# Patient Record
Sex: FEMALE | Race: WHITE | NOT HISPANIC OR LATINO | Employment: FULL TIME
[De-identification: names, ages, dates, MRNs, and addresses within clinical notes are randomized per-mention and may not be internally consistent; named-entity substitution may affect disease eponyms.]

---

## 2017-05-26 ENCOUNTER — HEALTH MAINTENANCE LETTER (OUTPATIENT)
Age: 52
End: 2017-05-26

## 2017-09-18 ENCOUNTER — HOSPITAL ENCOUNTER (OUTPATIENT)
Dept: MAMMOGRAPHY | Facility: CLINIC | Age: 52
Discharge: HOME | End: 2017-09-18
Attending: PHYSICIAN ASSISTANT | Admitting: PHYSICIAN ASSISTANT
Payer: COMMERCIAL

## 2017-09-18 DIAGNOSIS — Z12.31 VISIT FOR SCREENING MAMMOGRAM: ICD-10-CM

## 2017-09-18 PROCEDURE — G0202 SCR MAMMO BI INCL CAD: HCPCS

## 2017-11-29 ENCOUNTER — OFFICE VISIT (OUTPATIENT)
Dept: FAMILY MEDICINE | Facility: CLINIC | Age: 52
End: 2017-11-29
Payer: COMMERCIAL

## 2017-11-29 VITALS
WEIGHT: 215 LBS | HEART RATE: 84 BPM | SYSTOLIC BLOOD PRESSURE: 118 MMHG | BODY MASS INDEX: 33.74 KG/M2 | HEIGHT: 67 IN | DIASTOLIC BLOOD PRESSURE: 80 MMHG | TEMPERATURE: 97.8 F | RESPIRATION RATE: 18 BRPM | OXYGEN SATURATION: 94 %

## 2017-11-29 DIAGNOSIS — Z23 NEED FOR VACCINATION: ICD-10-CM

## 2017-11-29 DIAGNOSIS — Z00.01 ENCOUNTER FOR ROUTINE ADULT HEALTH EXAMINATION WITH ABNORMAL FINDINGS: Primary | ICD-10-CM

## 2017-11-29 DIAGNOSIS — Z12.11 SPECIAL SCREENING FOR MALIGNANT NEOPLASMS, COLON: ICD-10-CM

## 2017-11-29 DIAGNOSIS — R60.0 PERIPHERAL EDEMA: ICD-10-CM

## 2017-11-29 DIAGNOSIS — E66.811 OBESITY, CLASS I, BMI 30-34.9: ICD-10-CM

## 2017-11-29 PROCEDURE — 99396 PREV VISIT EST AGE 40-64: CPT | Performed by: PHYSICIAN ASSISTANT

## 2017-11-29 PROCEDURE — 90670 PCV13 VACCINE IM: CPT | Performed by: PHYSICIAN ASSISTANT

## 2017-11-29 PROCEDURE — 90472 IMMUNIZATION ADMIN EACH ADD: CPT | Performed by: PHYSICIAN ASSISTANT

## 2017-11-29 PROCEDURE — 87624 HPV HI-RISK TYP POOLED RSLT: CPT | Performed by: PHYSICIAN ASSISTANT

## 2017-11-29 PROCEDURE — 90471 IMMUNIZATION ADMIN: CPT | Performed by: PHYSICIAN ASSISTANT

## 2017-11-29 PROCEDURE — 90715 TDAP VACCINE 7 YRS/> IM: CPT | Performed by: PHYSICIAN ASSISTANT

## 2017-11-29 PROCEDURE — G0145 SCR C/V CYTO,THINLAYER,RESCR: HCPCS | Performed by: PHYSICIAN ASSISTANT

## 2017-11-29 ASSESSMENT — PAIN SCALES - GENERAL: PAINLEVEL: NO PAIN (0)

## 2017-11-29 NOTE — PROGRESS NOTES
SUBJECTIVE:   CC: Velma Rizvi is an 52 year old woman who presents for preventive health visit. Requesting new order for compression stockings due to varicosities. Also has hx of thrombophlebitis.     Physical   Annual:     Getting at least 3 servings of Calcium per day::  NO    Bi-annual eye exam::  Yes    Dental care twice a year::  Yes    Sleep apnea or symptoms of sleep apnea::  None    Diet::  Regular (no restrictions)    Frequency of exercise::  None    Taking medications regularly::  Yes    Medication side effects::  None    Additional concerns today::  No              Today's PHQ-2 Score:   PHQ-2 (  [employer]) 2017   Q1: Little interest or pleasure in doing things 0   Q2: Feeling down, depressed or hopeless 0   PHQ-2 Score 0   Q1: Little interest or pleasure in doing things Not at all   Q2: Feeling down, depressed or hopeless Not at all   PHQ-2 Score 0       Abuse: Current or Past(Physical, Sexual or Emotional)- No  Do you feel safe in your environment - Yes    Social History   Substance Use Topics     Smoking status: Current Every Day Smoker     Packs/day: 0.50     Years: 15.00     Smokeless tobacco: Never Used     Alcohol use Yes      Comment: rarely     The patient does not drink >3 drinks per day nor >7 drinks per week.    Reviewed orders with patient.  Reviewed health maintenance and updated orders accordingly - Yes  Patient Active Problem List   Diagnosis     Tobacco use disorder     Irregular periods     Varicose veins of legs     CARDIOVASCULAR SCREENING; LDL GOAL LESS THAN 160     Phlebitis and thrombophlebitis     Past Surgical History:   Procedure Laterality Date     C NONSPECIFIC PROCEDURE      Right hand surgery- tendon repair     HC TREATMENT INCOMPLETE  SURG, ANY TRIMESTER      D & C  s/p miscarriage       Social History   Substance Use Topics     Smoking status: Current Every Day Smoker     Packs/day: 0.50     Years: 15.00     Smokeless tobacco: Never Used     Alcohol  "use Yes      Comment: rarely     Family History   Problem Relation Age of Onset     CANCER Mother       - lung cancer with mets.     Respiratory Father      asthma     CANCER Maternal Grandmother      breast- 48 fatal age 51     DIABETES Maternal Uncle      Hypertension Brother      DIABETES Brother              Patient over age 50, mutual decision to screen reflected in health maintenance.      Pertinent mammograms are reviewed under the imaging tab.  History of abnormal Pap smear: NO - age 30-65 PAP every 5 years with negative HPV co-testing recommended    Reviewed and updated as needed this visit by clinical staffTobacco  Allergies  Meds         Reviewed and updated as needed this visit by Provider            Review of Systems  C: NEGATIVE for fever, chills, change in weight  I: NEGATIVE for worrisome rashes, moles or lesions  E: NEGATIVE for vision changes or irritation  ENT: NEGATIVE for ear, mouth and throat problems  R: NEGATIVE for significant cough or SOB  B: NEGATIVE for masses, tenderness or discharge  CV: NEGATIVE for chest pain, palpitations or peripheral edema  GI: NEGATIVE for nausea, abdominal pain, heartburn, or change in bowel habits  : NEGATIVE for unusual urinary or vaginal symptoms. No vaginal bleeding.  M: NEGATIVE for significant arthralgias or myalgia  N: NEGATIVE for weakness, dizziness or paresthesias  E: NEGATIVE for temperature intolerance, skin/hair changes  H: NEGATIVE for bleeding problems  P: NEGATIVE for changes in mood or affect      OBJECTIVE:   /80  Pulse 84  Temp 97.8  F (36.6  C) (Tympanic)  Resp 18  Ht 5' 6.5\" (1.689 m)  Wt 215 lb (97.5 kg)  LMP 2017  SpO2 94%  BMI 34.18 kg/m2  Physical Exam  GENERAL APPEARANCE: healthy, alert and no distress  EYES: Eyes grossly normal to inspection, PERRL and conjunctivae and sclerae normal  HENT: ear canals and TM's normal, nose and mouth without ulcers or lesions, oropharynx clear and oral mucous membranes " moist  NECK: no adenopathy, no asymmetry, masses, or scars and thyroid normal to palpation  RESP: lungs clear to auscultation - no rales, rhonchi or wheezes  BREAST: normal without masses, tenderness or nipple discharge and no palpable axillary masses or adenopathy  CV: regular rate and rhythm, normal S1 S2, no S3 or S4, no murmur, click or rub, no peripheral edema and peripheral pulses strong  ABDOMEN: soft, nontender, no hepatosplenomegaly, no masses and bowel sounds normal   (female): normal female external genitalia, normal urethral meatus, vaginal mucosal atrophy noted, normal cervix, adnexae, and uterus without masses or abnormal discharge  MS: no musculoskeletal defects are noted and gait is age appropriate without ataxia  SKIN: no suspicious lesions or rashes  NEURO: Normal strength and tone, sensory exam grossly normal, mentation intact and speech normal  PSYCH: mentation appears normal and affect normal/bright    ASSESSMENT/PLAN:       ICD-10-CM    1. Encounter for routine adult health examination without abnormal findings Z00.00 Pap imaged thin layer screen with HPV - recommended age 30 - 65 years (select HPV order below)     HPV High Risk Types DNA Cervical   2. Peripheral edema R60.9 COMPRESSION STOCKINGS   3. Need for vaccination Z23 Pneumococcal vaccine 13 valent PCV13 IM (Prevnar) [04980]     TDAP VACCINE (ADACEL) [58287.002]     1st  Administration  [01440]     Each additional admin.  (Right click and add QUANTITY)  [39485]       COUNSELING:  Reviewed preventive health counseling, as reflected in patient instructions       Regular exercise       Healthy diet/nutrition       Vision screening       Immunizations    Vaccinated for: Pneumococcal and TDAP           Osteoporosis Prevention/Bone Health           reports that she has been smoking.  She has a 7.50 pack-year smoking history. She has never used smokeless tobacco.  Tobacco Cessation Action Plan: Information offered: Patient not interested at  "this timereviewed MT services with her today.   Estimated body mass index is 34.18 kg/(m^2) as calculated from the following:    Height as of this encounter: 5' 6.5\" (1.689 m).    Weight as of this encounter: 215 lb (97.5 kg).   Weight management plan: Discussed healthy diet and exercise guidelines and patient will follow up in 12 months in clinic to re-evaluate.     Wrote script for compression stockings.    Counseling Resources:  ATP IV Guidelines  Pooled Cohorts Equation Calculator  Breast Cancer Risk Calculator  FRAX Risk Assessment  ICSI Preventive Guidelines  Dietary Guidelines for Americans, 2010  [employer]'s MyPlate  ASA Prophylaxis  Lung CA Screening    Rhoda Antony PA-C  [facility]    Orders Placed This Encounter     Pneumococcal vaccine 13 valent PCV13 IM (Prevnar) [80792]     TDAP VACCINE (ADACEL) [59335.002]     1st  Administration  [93892]     Each additional admin.  (Right click and add QUANTITY)  [39495]     Pap imaged thin layer screen with HPV - recommended age 30 - 65 years (select HPV order below)     HPV High Risk Types DNA Cervical     COMPRESSION STOCKINGS       AVS given to patient upon discharge today.  Electronically signed by Rhoda Antony PA-C  November 30, 2017  3:27 PM      "

## 2017-11-29 NOTE — NURSING NOTE
"Chief Complaint   Patient presents with     Physical       Initial /80  Pulse 84  Temp 97.8  F (36.6  C) (Tympanic)  Resp 18  Ht 5' 6.5\" (1.689 m)  Wt 215 lb (97.5 kg)  LMP 03/14/2017  SpO2 94%  BMI 34.18 kg/m2 Estimated body mass index is 34.18 kg/(m^2) as calculated from the following:    Height as of this encounter: 5' 6.5\" (1.689 m).    Weight as of this encounter: 215 lb (97.5 kg).  BP completed using cuff size: mundo Watt MA      "

## 2017-11-29 NOTE — NURSING NOTE
Prior to injection verified patient identity using patient's name and date of birth.  Per orders of Rhoda Antony injection of Tdap and Prevnar 13  given by Loren Watt MA. Patient instructed to remain in clinic for 20 minutes afterwards and to report any adverse reaction to me immediately.         Screening Questionnaire for Adult Immunization    Are you sick today?   No   Do you have allergies to medications, food, a vaccine component or latex?   No   Have you ever had a serious reaction after receiving a vaccination?   No   Do you have a long-term health problem with heart disease, lung disease, asthma, kidney disease, metabolic disease (e.g. diabetes), anemia, or other blood disorder?   No   Do you have cancer, leukemia, HIV/AIDS, or any other immune system problem?   No   In the past 3 months, have you taken medications that affect  your immune system, such as prednisone, other steroids, or anticancer drugs; drugs for the treatment of rheumatoid arthritis, Crohn s disease, or psoriasis; or have you had radiation treatments?   No   Have you had a seizure, or a brain or other nervous system problem?   No   During the past year, have you received a transfusion of blood or blood     products, or been given immune (gamma) globulin or antiviral drug?   No   For women: Are you pregnant or is there a chance you could become        pregnant during the next month?   No   Have you received any vaccinations in the past 4 weeks?   No     Immunization questionnaire answers were all negative.           Screening performed by Vivian Watt on 11/29/2017 at 5:15 PM.

## 2017-11-29 NOTE — MR AVS SNAPSHOT
After Visit Summary   11/29/2017    Velma Rizvi    MRN: [id number]           Patient Information     Date Of Birth          1965        Visit Information        Provider Department      11/29/2017 4:15 PM Rhoda Antony PA-C [facility]        Today's Diagnoses     Peripheral edema          Care Instructions      Preventive Health Recommendations  Female Ages 50 - 64    Yearly exam: See your health care provider every year in order to  o Review health changes.   o Discuss preventive care.    o Review your medicines if your doctor has prescribed any.      Get a Pap test every three years (unless you have an abnormal result and your provider advises testing more often).    If you get Pap tests with HPV test, you only need to test every 5 years, unless you have an abnormal result.     You do not need a Pap test if your uterus was removed (hysterectomy) and you have not had cancer.    You should be tested each year for STDs (sexually transmitted diseases) if you're at risk.     Have a mammogram every 1 to 2 years.    Have a colonoscopy at age 50, or have a yearly FIT test (stool test). These exams screen for colon cancer.      Have a cholesterol test every 5 years, or more often if advised.    Have a diabetes test (fasting glucose) every three years. If you are at risk for diabetes, you should have this test more often.     If you are at risk for osteoporosis (brittle bone disease), think about having a bone density scan (DEXA).    Shots: Get a flu shot each year. Get a tetanus shot every 10 years.    Nutrition:     Eat at least 5 servings of fruits and vegetables each day.    Eat whole-grain bread, whole-wheat pasta and brown rice instead of white grains and rice.    Talk to your provider about Calcium and Vitamin D.     Lifestyle    Exercise at least 150 minutes a week (30 minutes a day, 5 days a week). This will help you control your weight and prevent disease.    Limit  "alcohol to one drink per day.    No smoking.     Wear sunscreen to prevent skin cancer.     See your dentist every six months for an exam and cleaning.    See your eye doctor every 1 to 2 years.            Follow-ups after your visit        Who to contact     If you have questions or need follow up information about today's clinic visit or your schedule please contact [facility] directly at [phone].  Normal or non-critical lab and imaging results will be communicated to you by MyChart, letter or phone within 4 business days after the clinic has received the results. If you do not hear from us within 7 days, please contact the clinic through Medstrohart or phone. If you have a critical or abnormal lab result, we will notify you by phone as soon as possible.  Submit refill requests through [email or web address] or call your pharmacy and they will forward the refill request to us. Please allow 3 business days for your refill to be completed.          Additional Information About Your Visit        MedstroharSignicat Information     [email or web address] lets you send messages to your doctor, view your test results, renew your prescriptions, schedule appointments and more. To sign up, go to [email or web address] . Click on \"Log in\" on the left side of the screen, which will take you to the Welcome page. Then click on \"Sign up Now\" on the right side of the page.     You will be asked to enter the access code listed below, as well as some personal information. Please follow the directions to create your username and password.     Your access code is: [email or web address]  Expires: 2018  5:08 PM     Your access code will  in 90 days. If you need help or a new code, please call your [place] clinic or [phone].        Care EveryWhere ID     This is your Care EveryWhere ID. This could be used by other organizations to access your [place] medical records  [id number]        Your Vitals Were     Pulse Temperature Respirations Height Last " "Period Pulse Oximetry    84 97.8  F (36.6  C) (Tympanic) 18 5' 6.5\" (1.689 m) 03/14/2017 94%    BMI (Body Mass Index)                   34.18 kg/m2            Blood Pressure from Last 3 Encounters:   11/29/17 118/80   06/03/16 124/69   06/03/16 132/72    Weight from Last 3 Encounters:   11/29/17 215 lb (97.5 kg)   06/03/16 200 lb (90.7 kg)   01/05/14 203 lb (92.1 kg)              Today, you had the following     No orders found for display         Today's Medication Changes          These changes are accurate as of: 11/29/17  5:08 PM.  If you have any questions, ask your nurse or doctor.               These medicines have changed or have updated prescriptions.        Dose/Directions    COMPRESSION STOCKINGS   This may have changed:  additional instructions   Used for:  Peripheral edema   Changed by:  Rhoda Antony PA-C        Dose:  1 each   1 each daily Wear daily size large, two pair   Quantity:  2 each   Refills:  0            Where to get your medicines      Some of these will need a paper prescription and others can be bought over the counter.  Ask your nurse if you have questions.     Bring a paper prescription for each of these medications     COMPRESSION STOCKINGS                Primary Care Provider Office Phone # Fax #    Rhoda Antony PA-C [phone] [address] [place] [address]        Equal Access to Services     KYRIE CROWE AH: Hadii savita torres hadasho Soomaali, waaxda luqadaha, qaybta kaalmada adeegyada, [place] domingo haygiovany elmore . So [facility] [phone].    ATENCIÓN: Si habla español, tiene a rome disposición servicios gratuitos de asistencia lingüística. Llame al [phone].    We comply with applicable federal civil rights laws and [place] laws. We do not discriminate on the basis of race, color, national origin, age, disability, sex, sexual orientation, or gender identity.            Thank you!     Thank you for choosing [facility]" for your care. Our goal is always to provide you with excellent care. Hearing back from our patients is one way we can continue to improve our services. Please take a few minutes to complete the written survey that you may receive in the mail after your visit with us. Thank you!             Your Updated Medication List - Protect others around you: Learn how to safely use, store and throw away your medicines at www.disposemymeds.org.          This list is accurate as of: 11/29/17  5:08 PM.  Always use your most recent med list.                   Brand Name Dispense Instructions for use Diagnosis    aspirin 81 MG tablet      Take  by mouth daily.        COMPRESSION STOCKINGS     2 each    1 each daily Wear daily size large, two pair    Peripheral edema

## 2017-11-29 NOTE — LETTER
December 9, 2017    Velma Rizvi  [address] [address]    Dear Velma,  We are happy to inform you that your PAP smear result from 11/29/17 is normal.  We are now able to do a follow up test on PAP smears. The DNA test is for HPV (Human Papilloma Virus). Cervical cancer is closely linked with certain types of HPV. Your result showed no evidence of high risk HPV.  Therefore we recommend you return in 5 years for your next pap smear and HPV test.  You will still need to return to the clinic every year for an annual exam and other preventive tests.  Please contact the clinic at [phone] with any questions.  Sincerely,    Rhoda Antony PA-C/clarissa

## 2017-12-01 LAB
COPATH REPORT: NORMAL
PAP: NORMAL

## 2017-12-02 PROBLEM — E66.811 OBESITY, CLASS I, BMI 30-34.9: Status: ACTIVE | Noted: 2017-12-02

## 2017-12-04 LAB
FINAL DIAGNOSIS: NORMAL
HPV HR 12 DNA CVX QL NAA+PROBE: NEGATIVE
HPV16 DNA SPEC QL NAA+PROBE: NEGATIVE
HPV18 DNA SPEC QL NAA+PROBE: NEGATIVE
SPECIMEN DESCRIPTION: NORMAL

## 2017-12-12 ENCOUNTER — TELEPHONE (OUTPATIENT)
Dept: FAMILY MEDICINE | Facility: CLINIC | Age: 52
End: 2017-12-12

## 2017-12-12 NOTE — TELEPHONE ENCOUNTER
Left message for patient to return call to schedule colonoscopy or EGD. If Indu or Neda are unavailable, please transfer to the surgery center.     OK to schedule with Alli

## 2017-12-13 NOTE — TELEPHONE ENCOUNTER
Left message for patient to return call to schedule EGD/colonoscopy. If Neda or Indu are not available, please transfer to same day surgery

## 2018-01-08 ENCOUNTER — HOSPITAL ENCOUNTER (OUTPATIENT)
Facility: CLINIC | Age: 53
Discharge: HOME | End: 2018-01-08
Attending: INTERNAL MEDICINE | Admitting: INTERNAL MEDICINE
Payer: COMMERCIAL

## 2018-01-08 ENCOUNTER — SURGERY (OUTPATIENT)
Age: 53
End: 2018-01-08

## 2018-01-08 VITALS
TEMPERATURE: 97.8 F | DIASTOLIC BLOOD PRESSURE: 74 MMHG | SYSTOLIC BLOOD PRESSURE: 108 MMHG | RESPIRATION RATE: 16 BRPM | OXYGEN SATURATION: 98 % | HEART RATE: 79 BPM

## 2018-01-08 LAB — COLONOSCOPY: NORMAL

## 2018-01-08 PROCEDURE — 40000296 ZZH STATISTIC ENDO RECOVERY CLASS 1:2 FIRST HOUR: Performed by: INTERNAL MEDICINE

## 2018-01-08 PROCEDURE — 45378 DIAGNOSTIC COLONOSCOPY: CPT | Performed by: INTERNAL MEDICINE

## 2018-01-08 PROCEDURE — 25000132 ZZH RX MED GY IP 250 OP 250 PS 637: Performed by: INTERNAL MEDICINE

## 2018-01-08 PROCEDURE — 25000128 H RX IP 250 OP 636: Performed by: INTERNAL MEDICINE

## 2018-01-08 PROCEDURE — G0500 MOD SEDAT ENDO SERVICE >5YRS: HCPCS

## 2018-01-08 PROCEDURE — 25000125 ZZHC RX 250: Performed by: INTERNAL MEDICINE

## 2018-01-08 PROCEDURE — G0121 COLON CA SCRN NOT HI RSK IND: HCPCS | Performed by: INTERNAL MEDICINE

## 2018-01-08 RX ORDER — LIDOCAINE 40 MG/G
CREAM TOPICAL
Status: DISCONTINUED | OUTPATIENT
Start: 2018-01-08 | End: 2018-01-08 | Stop reason: HOSPADM

## 2018-01-08 RX ORDER — SIMETHICONE
LIQUID (ML) MISCELLANEOUS PRN
Status: DISCONTINUED | OUTPATIENT
Start: 2018-01-08 | End: 2018-01-08 | Stop reason: HOSPADM

## 2018-01-08 RX ORDER — ONDANSETRON 2 MG/ML
4 INJECTION INTRAMUSCULAR; INTRAVENOUS
Status: COMPLETED | OUTPATIENT
Start: 2018-01-08 | End: 2018-01-08

## 2018-01-08 RX ORDER — FENTANYL CITRATE 50 UG/ML
INJECTION, SOLUTION INTRAMUSCULAR; INTRAVENOUS PRN
Status: DISCONTINUED | OUTPATIENT
Start: 2018-01-08 | End: 2018-01-08 | Stop reason: HOSPADM

## 2018-01-08 RX ADMIN — MIDAZOLAM 2 MG: 1 INJECTION INTRAMUSCULAR; INTRAVENOUS at 11:07

## 2018-01-08 RX ADMIN — MIDAZOLAM 1 MG: 1 INJECTION INTRAMUSCULAR; INTRAVENOUS at 11:11

## 2018-01-08 RX ADMIN — MIDAZOLAM 1 MG: 1 INJECTION INTRAMUSCULAR; INTRAVENOUS at 11:15

## 2018-01-08 RX ADMIN — FENTANYL CITRATE 50 MCG: 50 INJECTION, SOLUTION INTRAMUSCULAR; INTRAVENOUS at 11:07

## 2018-01-08 RX ADMIN — Medication 20 MG: at 11:17

## 2018-01-08 RX ADMIN — ONDANSETRON HYDROCHLORIDE 4 MG: 2 SOLUTION INTRAMUSCULAR; INTRAVENOUS at 10:59

## 2018-01-08 RX ADMIN — MIDAZOLAM 1 MG: 1 INJECTION INTRAMUSCULAR; INTRAVENOUS at 11:10

## 2018-01-08 RX ADMIN — LIDOCAINE HYDROCHLORIDE 1 ML: 10 INJECTION, SOLUTION EPIDURAL; INFILTRATION; INTRACAUDAL; PERINEURAL at 10:18

## 2018-01-08 RX ADMIN — MIDAZOLAM 1 MG: 1 INJECTION INTRAMUSCULAR; INTRAVENOUS at 11:09

## 2018-01-08 RX ADMIN — MIDAZOLAM 1 MG: 1 INJECTION INTRAMUSCULAR; INTRAVENOUS at 11:08

## 2018-01-08 RX ADMIN — FENTANYL CITRATE 50 MCG: 50 INJECTION, SOLUTION INTRAMUSCULAR; INTRAVENOUS at 11:10

## 2018-01-08 NOTE — IP AVS SNAPSHOT
"                  MRN:[id number]                      After Visit Summary   1/8/2018    Velma Rizvi    MRN: [id number]           Thank you!     Thank you for choosing [facility] for your care. Our goal is always to provide you with excellent care. Hearing back from our patients is one way we can continue to improve our services. Please take a few minutes to complete the written survey that you may receive in the mail after you visit with us. Thank you!        Patient Information     Date Of Birth          1965        About your hospital stay     You were admitted on:  January 8, 2018 You last received care in the:  [facility]    You were discharged on:  January 8, 2018       Who to Call     For medical emergencies, please call 911.  For non-urgent questions about your medical care, please call your primary care provider or clinic, [phone]  For questions related to your surgery, please call your surgery clinic        Attending Provider     Provider Specialty    Paresh Curran MD Gastroenterology       Primary Care Provider Office Phone # Fax #    Rhoda Antony PA-C [phone]      Pending Results     No orders found from 1/6/2018 to 1/9/2018.            Admission Information     Date & Time Provider Department Dept. Phone    1/8/2018 Paresh Curran MD [facility] [phone]      Your Vitals Were     Blood Pressure Pulse Temperature Respirations Last Period Pulse Oximetry    108/74 79 97.8  F (36.6  C) (Oral) 16 03/14/2017 99%      MyChart Information     [employer] lets you send messages to your doctor, view your test results, renew your prescriptions, schedule appointments and more. To sign up, go to [email or web address] . Click on \"Log in\" on the left side of the screen, which will take you to the Welcome page. Then click on \"Sign up Now\" on the right side of the page.     You will be asked to enter the access code listed below, as well " as some personal information. Please follow the directions to create your username and password.     Your access code is: [email or web address]  Expires: 2018  5:08 PM     Your access code will  in 90 days. If you need help or a new code, please call your [facility] clinic or [phone].        Care EveryWhere ID     This is your Care EveryWhere ID. This could be used by other organizations to access your [facility] medical records  [id number]        Equal Access to Services     KYRIE CROWE : Hadii asvita torres hadasho Soomaali, waaxda luqadaha, qaybta kaalmada adeegyada, [place] elmoer . So [facility] [phone].    ATENCIÓN: Si habla español, tiene a rome disposición servicios gratuitos de asistencia lingüística. Llame al [phone].    We comply with applicable federal civil rights laws and [place] laws. We do not discriminate on the basis of race, color, national origin, age, disability, sex, sexual orientation, or gender identity.               Review of your medicines      CONTINUE these medicines which have NOT CHANGED        Dose / Directions    ADVIL PO   Indication:  Migraine Headache        Dose:  800 mg   Take 800 mg by mouth every 6 hours as needed for moderate pain   Refills:  0       aspirin 81 MG tablet        Take  by mouth daily.   Refills:  0       COMPRESSION STOCKINGS   Used for:  Peripheral edema        Dose:  1 each   1 each daily Wear daily size large, two pair   Quantity:  2 each   Refills:  0       VITAMIN D (CHOLECALCIFEROL) PO        Dose:  2000 Units   Take 2,000 Units by mouth daily   Refills:  0                Protect others around you: Learn how to safely use, store and throw away your medicines at www.disposemymeds.org.             Medication List: This is a list of all your medications and when to take them. Check marks below indicate your daily home schedule. Keep this list as a reference.      Medications           Morning Afternoon Evening Bedtime As Needed     ADVIL PO   Take 800 mg by mouth every 6 hours as needed for moderate pain                                aspirin 81 MG tablet   Take  by mouth daily.                                COMPRESSION STOCKINGS   1 each daily Wear daily size large, two pair                                VITAMIN D (CHOLECALCIFEROL) PO   Take 2,000 Units by mouth daily

## 2018-01-08 NOTE — IP AVS SNAPSHOT
[facility]    [address] [address]    Phone:  [phone]                                       After Visit Summary   1/8/2018    Velma Rizvi    MRN: [id number]           After Visit Summary Signature Page     I have received my discharge instructions, and my questions have been answered. I have discussed any challenges I see with this plan with the nurse or doctor.    ..........................................................................................................................................  Patient/Patient Representative Signature      ..........................................................................................................................................  Patient Representative Print Name and Relationship to Patient    ..................................................               ................................................  Date                                            Time    ..........................................................................................................................................  Reviewed by Signature/Title    ...................................................              ..............................................  Date                                                            Time

## 2018-01-08 NOTE — CONSULTS
[facility] GI Pre-Procedure Physical Assessment    Velma Rizvi MRN# [id number]   Age: 52 year old YOB: 1965      Date of Surgery: 1/8/2018  Location [facility]      Date of Exam 1/8/2018 Facility (Same day)       Home clinic: [facility]  Primary care provider: Rhoda Antony         Active problem list:   Patient Active Problem List   Diagnosis     Tobacco use disorder     Irregular periods     Varicose veins of legs     CARDIOVASCULAR SCREENING; LDL GOAL LESS THAN 160     Phlebitis and thrombophlebitis     Obesity, Class I, BMI 30-34.9            Medications (include herbals and vitamins):   Any Plavix use in the last 7 days?  No     Current Facility-Administered Medications   Medication     lidocaine 1 % 1 mL     lidocaine (LMX4) kit     sodium chloride (PF) 0.9% PF flush 3 mL     sodium chloride (PF) 0.9% PF flush 3 mL     ondansetron (ZOFRAN) injection 4 mg             Allergies:      Allergies   Allergen Reactions     No Known Drug Allergies      Allergy to Latex?  No  Allergy to tape?    No          Social History:     Social History   Substance Use Topics     Smoking status: Current Every Day Smoker     Packs/day: 0.50     Years: 15.00     Smokeless tobacco: Never Used     Alcohol use Yes      Comment: rarely            Physical Exam:   All vitals have been reviewed  Blood pressure 127/85, pulse 79, temperature 97.8  F (36.6  C), temperature source Oral, resp. rate 16, last menstrual period 03/14/2017, SpO2 98 %.  Airway assessment:   Patient is able to open mouth wide  Patient is able to stick out tongue      Lungs:   No increased work of breathing, good air exchange, clear to auscultation bilaterally, no crackles or wheezing      Cardiovascular:   Normal apical impulse, regular rate and rhythm, normal S1 and S2, no S3 or S4, and no murmur noted           Lab / Radiology Results:   All laboratory data reviewed          Assessment:    Appropriately NPO  Chief complaint or anatomic assessment of involved area: screen         Plan:   Moderate (conscious) sedation     Patient's active problems diagnostically and therapeutically optimized for the planned procedure  Risks, benefits, alternatives to sedation and blood explained and consent obtained  Risks, benefits, alternatives to procedure explained and consent obtained  Orders and progress notes are in the chart  Discharge from Phase 1 and / or Phase 2 recovery when patient meets criteria    I have reviewed the history and physical, lab finding(s), diagnostic data, medicaitons, and the plan for sedation.  I have determined this patient to be an appropriate candidate for the planned sedation / procedure and have reassessed the patient immediately prior to sedation / procedure.    I have personally and medically directed the administration of medications used.    Paresh Curran MD

## 2018-07-12 ENCOUNTER — OFFICE VISIT (OUTPATIENT)
Dept: URGENT CARE | Facility: RETAIL CLINIC | Age: 53
End: 2018-07-12
Payer: COMMERCIAL

## 2018-07-12 VITALS
HEART RATE: 76 BPM | OXYGEN SATURATION: 94 % | TEMPERATURE: 98.6 F | DIASTOLIC BLOOD PRESSURE: 83 MMHG | SYSTOLIC BLOOD PRESSURE: 123 MMHG

## 2018-07-12 DIAGNOSIS — J20.9 ACUTE BRONCHITIS WITH COEXISTING CONDITION REQUIRING PROPHYLACTIC TREATMENT: Primary | ICD-10-CM

## 2018-07-12 PROCEDURE — 99213 OFFICE O/P EST LOW 20 MIN: CPT | Performed by: FAMILY MEDICINE

## 2018-07-12 RX ORDER — AZITHROMYCIN 250 MG/1
TABLET, FILM COATED ORAL
Qty: 6 TABLET | Refills: 0 | Status: SHIPPED | OUTPATIENT
Start: 2018-07-12 | End: 2018-09-06

## 2018-07-12 NOTE — PROGRESS NOTES
SUBJECTIVE:  Velma Rizvi is a 52 year old female who presents to the clinic today with a chief complaint of cough  for 2 week(s).  Her cough is described as persistent, daytime, nightime and productive of yellow sputum. Smoker!!    The patient's symptoms are severe and worsening.  Associated symptoms include congestion and malaise. The patient's symptoms are exacerbated by no particular triggers  Patient has been using OTC cough suppressants  to improve symptoms.    Past Medical History:   Diagnosis Date     Phlebitis and thrombophlebitis 10/12/2012     Current Outpatient Prescriptions   Medication Sig Dispense Refill     aspirin 81 MG tablet Take  by mouth daily.       azithromycin (ZITHROMAX) 250 MG tablet Two tablets first day, then one tablet daily for four days. 6 tablet 0     COMPRESSION STOCKINGS 1 each daily Wear daily size large, two pair (Patient not taking: Reported on 7/12/2018) 2 each 0     Ibuprofen (ADVIL PO) Take 800 mg by mouth every 6 hours as needed for moderate pain       VITAMIN D, CHOLECALCIFEROL, PO Take 2,000 Units by mouth daily       History   Smoking Status     Current Every Day Smoker     Packs/day: 0.50     Years: 15.00   Smokeless Tobacco     Never Used       ROS  Review of systems negative except as stated above.    OBJECTIVE:  /83 (BP Location: Right arm, Patient Position: Chair, Cuff Size: Adult Regular)  Pulse 76  Temp 98.6  F (37  C) (Oral)  LMP 03/14/2017  SpO2 94%  GENERAL APPEARANCE: healthy, alert and no distress  EYES: EOMI,  PERRL, conjunctiva clear  HENT: ear canals and TM's normal.  Nose and mouth without ulcers, erythema or lesions  NECK: supple, nontender, no lymphadenopathy  RESP: rhonchi throughout  CV: regular rates and rhythm, normal S1 S2, no murmur noted  ABDOMEN:  soft, nontender, no HSM or masses and bowel sounds normal  NEURO: Normal strength and tone, sensory exam grossly normal,  normal speech and mentation  SKIN: no suspicious lesions or  sue    ASSESSMENT:    Acute Bronchitis  smoker    PLAN:  No orders of the defined types were placed in this encounter.    Symptomatic measures encouraged, humidified air, plenty of fluids.  Follow up with primary care provider if no improvement.

## 2018-07-12 NOTE — MR AVS SNAPSHOT
After Visit Summary   7/12/2018    Velma Rizvi    MRN: [id number]           Patient Information     Date Of Birth          1965        Visit Information        Provider Department      7/12/2018 9:50 AM Geovanny Otero MD [facility]        Today's Diagnoses     Acute bronchitis with coexisting condition requiring prophylactic treatment    -  1      Care Instructions      Acute Bronchitis  Your healthcare provider has told you that you have acute bronchitis. Bronchitis is infection or inflammation of the bronchial tubes (airways in the lungs). Normally, air moves easily in and out of the airways. Bronchitis narrows the airways, making it harder for air to flow in and out of the lungs. This causes symptoms such as shortness of breath, coughing up yellow or green mucus, and wheezing. Bronchitis can be acute or chronic. Acute means the condition comes on quickly and goes away in a short time, usually within 3 to 10 days. Chronic means a condition lasts a long time and often comes back.    What causes acute bronchitis?  Acute bronchitis almost always starts as a viral respiratory infection, such as a cold or the flu. Certain factors make it more likely for a cold or flu to turn into bronchitis. These include being very young, being elderly, having a heart or lung problem, or having a weak immune system. Cigarette smoking also makes bronchitis more likely.  When bronchitis develops, the airways become swollen. The airways may also become infected with bacteria. This is known as a secondary infection.  Diagnosing acute bronchitis  Your healthcare provider will examine you and ask about your symptoms and health history. You may also have a sputum culture to test the fluid in your lungs. Chest X-rays may be done to look for infection in the lungs.  Treating acute bronchitis  Bronchitis usually clears up as the cold or flu goes away. You can help feel better faster by doing the  following:    Take medicine as directed. You may be told to take ibuprofen or other over-the-counter medicines. These help relieve inflammation in your bronchial tubes. Your healthcare provider may prescribe an inhaler to help open up the bronchial tubes. Most of the time, acute bronchitis is caused by a viral infection. Antibiotics are usually not prescribed for viral infections.    Drink plenty of fluids, such as water, juice, or warm soup. Fluids loosen mucus so that you can cough it up. This helps you breathe more easily. Fluids also prevent dehydration.    Make sure you get plenty of rest.    Do not smoke. Do not allow anyone else to smoke in your home.  Recovery and follow-up  Follow up with your doctor as you are told. You will likely feel better in a week or two. But a dry cough can linger beyond that time. Let your doctor know if you still have symptoms (other than a dry cough) after 2 weeks, or if you re prone to getting bronchial infections. Take steps to protect yourself from future infections. These steps include stopping smoking and avoiding tobacco smoke, washing your hands often, and getting a yearly flu shot.  When to call your healthcare provider  Call the healthcare provider if you have any of the following:    Fever of 100.4 F (38.0 C) or higher, or as advised    Symptoms that get worse, or new symptoms    Trouble breathing    Symptoms that don t start to improve within a week, or within 3 days of taking antibiotics   Date Last Reviewed: 12/1/2016 2000-2017 [employer]. [address] [place] [address]. All rights reserved. This information is not intended as a substitute for professional medical care. Always follow your healthcare professional's instructions.                Follow-ups after your visit        Who to contact     You can reach your care team any time of the day by calling [phone].  Notification of test results:  If you have an abnormal lab result, we  "will notify you by phone as soon as possible.         Additional Information About Your Visit        MyChart Information     [employer] lets you send messages to your doctor, view your test results, renew your prescriptions, schedule appointments and more. To sign up, go to [email or web address] . Click on \"Log in\" on the left side of the screen, which will take you to the Welcome page. Then click on \"Sign up Now\" on the right side of the page.     You will be asked to enter the access code listed below, as well as some personal information. Please follow the directions to create your username and password.     Your access code is: [email or web address]  Expires: 10/10/2018  9:52 AM     Your access code will  in 90 days. If you need help or a new code, please call your [place] clinic or [phone].        Care EveryWhere ID     This is your Care EveryWhere ID. This could be used by other organizations to access your [place] medical records  [id number]        Your Vitals Were     Pulse Temperature Last Period Pulse Oximetry          76 98.6  F (37  C) (Oral) 2017 94%         Blood Pressure from Last 3 Encounters:   18 123/83   18 108/74   17 118/80    Weight from Last 3 Encounters:   17 215 lb (97.5 kg)   16 200 lb (90.7 kg)   14 203 lb (92.1 kg)              Today, you had the following     No orders found for display         Today's Medication Changes          These changes are accurate as of 18  9:52 AM.  If you have any questions, ask your nurse or doctor.               Start taking these medicines.        Dose/Directions    azithromycin 250 MG tablet   Commonly known as:  ZITHROMAX   Used for:  Acute bronchitis with coexisting condition requiring prophylactic treatment   Started by:  Geovanny Otero MD        Two tablets first day, then one tablet daily for four days.   Quantity:  6 tablet   Refills:  0            Where to get your medicines      These " medications were sent to [facility] - [place], [place] - [address]  [address], [facility] [address]     Phone:  [phone]     azithromycin 250 MG tablet                Primary Care Provider Office Phone # Fax #    Rhoda Antony PA-C [phone] [address] [facility]   [facility] [place] [address]        Equal Access to Services     [facility]: Hadii aad ku hadasho Soomaali, waaxda luqadaha, qaybta kaalmada adeegyada, waxay idiin hayaan adeeg kharash la'aan . So [facility] [phone].    ATENCIÓN: Si habla español, tiene a rome disposición servicios gratuitos de asistencia lingüística. Llame al [phone].    We comply with applicable federal civil rights laws and [place] laws. We do not discriminate on the basis of race, color, national origin, age, disability, sex, sexual orientation, or gender identity.            Thank you!     Thank you for choosing [facility]  for your care. Our goal is always to provide you with excellent care. Hearing back from our patients is one way we can continue to improve our services. Please take a few minutes to complete the written survey that you may receive in the mail after your visit with us. Thank you!             Your Updated Medication List - Protect others around you: Learn how to safely use, store and throw away your medicines at www.disposemymeds.org.          This list is accurate as of 7/12/18  9:52 AM.  Always use your most recent med list.                   Brand Name Dispense Instructions for use Diagnosis    ADVIL PO      Take 800 mg by mouth every 6 hours as needed for moderate pain        aspirin 81 MG tablet      Take  by mouth daily.        azithromycin 250 MG tablet    ZITHROMAX    6 tablet    Two tablets first day, then one tablet daily for four days.    Acute bronchitis with coexisting condition requiring prophylactic treatment       COMPRESSION STOCKINGS     2 each    1 each daily Wear daily size large, two pair     Peripheral edema       VITAMIN D (CHOLECALCIFEROL) PO      Take 2,000 Units by mouth daily

## 2018-07-12 NOTE — PATIENT INSTRUCTIONS

## 2018-09-06 ENCOUNTER — OFFICE VISIT (OUTPATIENT)
Dept: FAMILY MEDICINE | Facility: CLINIC | Age: 53
End: 2018-09-06
Payer: COMMERCIAL

## 2018-09-06 VITALS
RESPIRATION RATE: 18 BRPM | HEIGHT: 66 IN | OXYGEN SATURATION: 95 % | TEMPERATURE: 97.9 F | BODY MASS INDEX: 34.07 KG/M2 | WEIGHT: 212 LBS | SYSTOLIC BLOOD PRESSURE: 128 MMHG | DIASTOLIC BLOOD PRESSURE: 82 MMHG | HEART RATE: 60 BPM

## 2018-09-06 DIAGNOSIS — J98.9 REACTIVE AIRWAY DISEASE THAT IS NOT ASTHMA: ICD-10-CM

## 2018-09-06 DIAGNOSIS — J01.00 ACUTE NON-RECURRENT MAXILLARY SINUSITIS: ICD-10-CM

## 2018-09-06 DIAGNOSIS — J31.0 CHRONIC RHINITIS, UNSPECIFIED TYPE: ICD-10-CM

## 2018-09-06 DIAGNOSIS — M79.675 TOE PAIN, CHRONIC, LEFT: ICD-10-CM

## 2018-09-06 DIAGNOSIS — R60.0 PERIPHERAL EDEMA: ICD-10-CM

## 2018-09-06 DIAGNOSIS — M79.672 LEFT FOOT PAIN: Primary | ICD-10-CM

## 2018-09-06 DIAGNOSIS — G89.29 TOE PAIN, CHRONIC, LEFT: ICD-10-CM

## 2018-09-06 DIAGNOSIS — F17.200 TOBACCO USE DISORDER: ICD-10-CM

## 2018-09-06 PROCEDURE — 99214 OFFICE O/P EST MOD 30 MIN: CPT | Performed by: PHYSICIAN ASSISTANT

## 2018-09-06 RX ORDER — DOXYCYCLINE 100 MG/1
100 CAPSULE ORAL 2 TIMES DAILY
Qty: 20 CAPSULE | Refills: 0 | Status: SHIPPED | OUTPATIENT
Start: 2018-09-06 | End: 2019-01-23

## 2018-09-06 RX ORDER — FLUTICASONE PROPIONATE 50 MCG
2 SPRAY, SUSPENSION (ML) NASAL DAILY
Qty: 1 BOTTLE | Refills: 11 | Status: SHIPPED | OUTPATIENT
Start: 2018-09-06 | End: 2019-06-10

## 2018-09-06 RX ORDER — MONTELUKAST SODIUM 10 MG/1
10 TABLET ORAL AT BEDTIME
Qty: 90 TABLET | Refills: 0 | Status: SHIPPED | OUTPATIENT
Start: 2018-09-06 | End: 2019-02-07

## 2018-09-06 RX ORDER — PREDNISONE 20 MG/1
40 TABLET ORAL DAILY
Qty: 10 TABLET | Refills: 0 | Status: SHIPPED | OUTPATIENT
Start: 2018-09-06 | End: 2019-02-07

## 2018-09-06 ASSESSMENT — PAIN SCALES - GENERAL: PAINLEVEL: NO PAIN (0)

## 2018-09-06 NOTE — PROGRESS NOTES
"  SUBJECTIVE:   Velma Rizvi is a 52 year old female who presents to clinic today for the following health issues:      Left foot pain, left second toe pain.  States that she walks barefoot almost always in her home.  She has \"stubbed his toe\" multiple times.  She has been noticing that it is painful and just not appearing normal, seems to sit higher than the other toes and in comparison to her other foot.  Has noted swelling.  Wondering if she may have \"jammed it\" or broken it at one point.  Does not seem to bother her when she is not on her feet.    Onset: 1 year     Description:   Location: toe next to the big on the left foot   Character: Sharp, Dull ache and Stabbing    Intensity: moderate    Progression of Symptoms: same    Accompanying Signs & Symptoms:  Other symptoms: swelling    History:   Previous similar pain: no       Precipitating factors:   Trauma or overuse: YES    Alleviating factors:  Improved by: NSAID - ibuprofen     Therapies Tried and outcome: none         Acute Illness   Acute illness concerns: Has been having chronic nasal drainage, postnasal drainage, shortness of breath, and wheezing/tightness in her chest.  Has also noticed some intermittent peripheral edema.  States she was seen in urgent care 7/12/2018.  Was diagnosed with acute bronchitis in express care 7/12/2018.  Was given a Z-Casey.  Urged to quit smoking.  States that her lungs have never recovered and she continues to have tightness which creates some shortness of breath.  Also has noticed a chronic runny nose.  Tends to be more purulent in nature.  Very thick.  Only changes are that they moved into a new home, she cannot think of any other exposures that are new or different.  No new pets.  She jokes today \"I am allergic to my new house\".  No previous history of any allergy problems.  Not currently using any medications for this.  Onset: End of June    Fever: no    Chills/Sweats: no    Headache (location?): no    Sinus " Pressure:YES    Conjunctivitis:  no    Ear Pain: no    Rhinorrhea: YES    Congestion: YES    Sore Throat: no     Cough: YES- Cough is mild and due to a tickle in the posterior throat.    Wheeze: YES-possible although patient is unaware what wheezing is like as she has never had history.    Decreased Appetite: no    Nausea: no    Vomiting: no    Diarrhea:  no    Dysuria/Freq.: no    Fatigue/Achiness: no    Sick/Strep Exposure: no     Therapies Tried and outcome: Z-Casey mid July.  Otherwise not using any other medications.      Problem list and histories reviewed & adjusted, as indicated.  Additional history: as documented    Patient Active Problem List   Diagnosis     Tobacco use disorder     Irregular periods     Varicose veins of legs     CARDIOVASCULAR SCREENING; LDL GOAL LESS THAN 160     Phlebitis and thrombophlebitis     Obesity, Class I, BMI 30-34.9     Peripheral edema     Past Surgical History:   Procedure Laterality Date     C NONSPECIFIC PROCEDURE      Right hand surgery- tendon repair     COLONOSCOPY N/A 2018    Procedure: COLONOSCOPY;  COLONOSCOPY;  Surgeon: Paresh Curran MD;  Location:  GI     HC TREATMENT INCOMPLETE  SURG, ANY TRIMESTER      D & C  s/p miscarriage       Social History   Substance Use Topics     Smoking status: Current Every Day Smoker     Packs/day: 0.50     Years: 15.00     Smokeless tobacco: Never Used     Alcohol use Yes      Comment: rarely     Family History   Problem Relation Age of Onset     Cancer Mother       - lung cancer with mets.     Respiratory Father      asthma     Cancer Maternal Grandmother      breast- 48 fatal age 51     Diabetes Maternal Uncle      Hypertension Brother      Diabetes Brother            Reviewed and updated as needed this visit by clinical staff  Tobacco  Allergies  Meds       Reviewed and updated as needed this visit by Provider         ROS:  Constitutional, HEENT, cardiovascular, pulmonary, gi and gu systems are  "negative, except as otherwise noted.    OBJECTIVE:     /82  Pulse 60  Temp 97.9  F (36.6  C) (Temporal)  Resp 18  Ht 5' 6\" (1.676 m)  Wt 212 lb (96.2 kg)  LMP 03/14/2017  SpO2 95%  BMI 34.22 kg/m2  Body mass index is 34.22 kg/(m^2).   GENERAL: alert, no distress and obese  EYES: Eyes grossly normal to inspection, PERRL and conjunctivae and sclerae normal  HENT: normal cephalic/atraumatic, both ears: clear effusion, nasal mucosa pale, boggy, edematous , rhinorrhea clear, oral mucous membranes moist, sinuses: maxillary tenderness on both sides and increased postnasal drainage and redness in the posterior pharynx.  NECK: no adenopathy, no asymmetry, masses, or scars and thyroid normal to palpation  RESP: Scattered rhonchi throughout, mild wheezing anterior.  No rales, no respiratory distress during the visit.  CV: regular rate and rhythm, normal S1 S2, no S3 or S4, no murmur, click or rub, no peripheral edema and peripheral pulses strong  MS: Exam of the left foot reveals swelling along the plantar surface distally beneath the second and third toes.  Second toe appears to be more pronounced and sitting higher.  She has full range of motion of the toe without any pain.  No pain to firm palpation over the joints.  She does have pain along the plantar surface where swelling is noted.  SKIN: no suspicious lesions or rashes    No peripheral edema is noted today.    Diagnostic Test Results:  none     ASSESSMENT:      Left foot pain  Toe pain, chronic, left  Peripheral edema  Reactive airway disease that is not asthma  Chronic rhinitis, unspecified type  Acute non-recurrent maxillary sinusitis  Tobacco use disorder      PLAN:       ICD-10-CM    1. Left foot pain M79.672 PODIATRY/FOOT & ANKLE SURGERY REFERRAL   2. Toe pain, chronic, left M79.675 PODIATRY/FOOT & ANKLE SURGERY REFERRAL    G89.29    3. Peripheral edema R60.9 COMPRESSION STOCKINGS   4. Reactive airway disease that is not asthma R09.89 montelukast " (SINGULAIR) 10 MG tablet     predniSONE (DELTASONE) 20 MG tablet     doxycycline (VIBRAMYCIN) 100 MG capsule   5. Chronic rhinitis, unspecified type J31.0 fluticasone (FLONASE) 50 MCG/ACT spray     montelukast (SINGULAIR) 10 MG tablet     predniSONE (DELTASONE) 20 MG tablet     doxycycline (VIBRAMYCIN) 100 MG capsule   6. Acute non-recurrent maxillary sinusitis J01.00 doxycycline (VIBRAMYCIN) 100 MG capsule   7. Tobacco use disorder F17.200        Tobacco Cessation:   reports that she has been smoking.  She has a 7.50 pack-year smoking history. She has never used smokeless tobacco.  Tobacco Cessation Action Plan: Information offered: Patient not interested at this time  I have discussed [facility] pharmacy with her in the past and how this would benefit her with smoking cessation.  She will continue to think about this.    Compression stockings if she is feeling a bit of peripheral edema.  She will need a bigger workup if any of her symptoms persist including echo and chest x-ray.  Today she wanted to hold on any big studies if possible.  Also suggested that lab work would need to be done.  If any of her symptoms persist we will need to be more aggressive with a workup.  She agrees to this plan.  MEDICATIONS:        - Start taking medications including prednisone and doxycycline.  See orders.  Also will initiate Singulair and Flonase along with Zyrtec OTC.  Once symptoms have completely cleared, I would like her to stop her medications and see if she has return of symptoms.  If she does, then I would suggest she see an allergist for more formalized testing.       - Continue other medications without change  CONSULTATION/REFERRAL to podiatry-opted to defer any imaging studies to Dr. Fay.  I urged her not to walk barefoot as this is likely exacerbating the foot/toe issue.  Good form fitted shoe is recommended.  FUTURE APPOINTMENTS:       - Follow-up visit if symptoms persist or worsen-otherwise she will be seen  yearly.    Rhoda Antony PA-C  [facility]    Orders Placed This Encounter     PODIATRY/FOOT & ANKLE SURGERY REFERRAL     COMPRESSION STOCKINGS     fluticasone (FLONASE) 50 MCG/ACT spray     montelukast (SINGULAIR) 10 MG tablet     predniSONE (DELTASONE) 20 MG tablet     doxycycline (VIBRAMYCIN) 100 MG capsule       AVS given to patient upon discharge today.  Electronically signed by Rhoda Antony PA-C  September 9, 2018  10:41 AM

## 2018-09-06 NOTE — NURSING NOTE
"Chief Complaint   Patient presents with     Musculoskeletal Problem     foot pain        Initial /82  Pulse 60  Temp 97.9  F (36.6  C) (Temporal)  Resp 18  Ht 5' 6\" (1.676 m)  Wt 212 lb (96.2 kg)  LMP 03/14/2017  SpO2 95%  BMI 34.22 kg/m2 Estimated body mass index is 34.22 kg/(m^2) as calculated from the following:    Height as of this encounter: 5' 6\" (1.676 m).    Weight as of this encounter: 212 lb (96.2 kg).  BP completed using cuff size: rylan Watt MA      "

## 2018-09-06 NOTE — MR AVS SNAPSHOT
After Visit Summary   9/6/2018    Velma Rizvi    MRN: [id number]           Patient Information     Date Of Birth          1965        Visit Information        Provider Department      9/6/2018 2:45 PM Rhoda Antony PA-C [facility]        Today's Diagnoses     Left foot pain    -  1    Toe pain, chronic, left        Peripheral edema        Reactive airway disease that is not asthma        Chronic rhinitis, unspecified type        Acute non-recurrent maxillary sinusitis           Follow-ups after your visit        Additional Services     PODIATRY/FOOT & ANKLE SURGERY REFERRAL       Your provider has referred you to: FMG: [facility] [phone]   [email or web address]    Please be aware that coverage of these services is subject to the terms and limitations of your health insurance plan.  Call member services at your health plan with any benefit or coverage questions.      Please bring the following to your appointment:  >>   Any x-rays, CTs or MRIs which have been performed.  Contact the facility where they were done to arrange for  prior to your scheduled appointment.    >>   List of current medications   >>   This referral request   >>   Any documents/labs given to you for this referral                  Your next 10 appointments already scheduled     Sep 17, 2018  1:30 PM CDT   New Visit with Hal Fay DPM   [facility]    [address] [place] [id number] [phone]              Who to contact     If you have questions or need follow up information about today's clinic visit or your schedule please contact [facility] directly at [phone].  Normal or non-critical lab and imaging results will be communicated to you by MyChart, letter or phone within 4 business days after the clinic has received the results. If you do  "not hear from us within 7 days, please contact the clinic through [employer] or phone. If you have a critical or abnormal lab result, we will notify you by phone as soon as possible.  Submit refill requests through [employer]werks or call your pharmacy and they will forward the refill request to us. Please allow 3 business days for your refill to be completed.          Additional Information About Your Visit        [employer] [employer] Information     [employer]werks lets you send messages to your doctor, view your test results, renew your prescriptions, schedule appointments and more. To sign up, go to [email or web address] . Click on \"Log in\" on the left side of the screen, which will take you to the Welcome page. Then click on \"Sign up Now\" on the right side of the page.     You will be asked to enter the access code listed below, as well as some personal information. Please follow the directions to create your username and password.     Your access code is: [email or web address]  Expires: 10/10/2018  9:52 AM     Your access code will  in 90 days. If you need help or a new code, please call your [facility] clinic or [phone].        Care EveryWhere ID     This is your Care EveryWhere ID. This could be used by other organizations to access your [facility] medical records  [id number]        Your Vitals Were     Pulse Temperature Respirations Height Last Period Pulse Oximetry    60 97.9  F (36.6  C) (Temporal) 18 5' 6\" (1.676 m) 2017 95%    BMI (Body Mass Index)                   34.22 kg/m2            Blood Pressure from Last 3 Encounters:   18 128/82   18 123/83   18 108/74    Weight from Last 3 Encounters:   18 212 lb (96.2 kg)   17 215 lb (97.5 kg)   16 200 lb (90.7 kg)              We Performed the Following     PODIATRY/FOOT & ANKLE SURGERY REFERRAL          Today's Medication Changes          These changes are accurate as of 18  3:40 PM.  If you have any questions, ask your nurse or doctor.    "            Start taking these medicines.        Dose/Directions    doxycycline 100 MG capsule   Commonly known as:  VIBRAMYCIN   Used for:  Chronic rhinitis, unspecified type, Reactive airway disease that is not asthma, Acute non-recurrent maxillary sinusitis   Started by:  Rhoda Antony PA-C        Dose:  100 mg   Take 1 capsule (100 mg) by mouth 2 times daily   Quantity:  20 capsule   Refills:  0       fluticasone 50 MCG/ACT spray   Commonly known as:  FLONASE   Used for:  Chronic rhinitis, unspecified type   Started by:  Rhoda Antony PA-C        Dose:  2 spray   Spray 2 sprays into both nostrils daily   Quantity:  1 Bottle   Refills:  11       montelukast 10 MG tablet   Commonly known as:  SINGULAIR   Used for:  Reactive airway disease that is not asthma, Chronic rhinitis, unspecified type   Started by:  Rhoda Antony PA-C        Dose:  10 mg   Take 1 tablet (10 mg) by mouth At Bedtime   Quantity:  90 tablet   Refills:  0       predniSONE 20 MG tablet   Commonly known as:  DELTASONE   Used for:  Reactive airway disease that is not asthma, Chronic rhinitis, unspecified type   Started by:  Rhoda Antony PA-C        Dose:  40 mg   Take 2 tablets (40 mg) by mouth daily for 5 days   Quantity:  10 tablet   Refills:  0            Where to get your medicines      These medications were sent to [facility] [place] [address] [facility] [address]     Phone:  [phone]     doxycycline 100 MG capsule    fluticasone 50 MCG/ACT spray    montelukast 10 MG tablet    predniSONE 20 MG tablet         Some of these will need a paper prescription and others can be bought over the counter.  Ask your nurse if you have questions.     Bring a paper prescription for each of these medications     COMPRESSION STOCKINGS                Primary Care Provider Office Phone # Fax #    Rhoda Antony PA-C [phone]       [place]  [address]        Equal Access to Services     [facility]: Hadii savita torres vijayteresa Kristina, watarada luqadaha, qaybta kaalmamargarita mao, phoenix ray. So [facility] [phone].    ATENCIÓN: Si habla español, tiene a rome disposición servicios gratuitos de asistencia lingüística. Llame al [phone].    We comply with applicable federal civil rights laws and [place] laws. We do not discriminate on the basis of race, color, national origin, age, disability, sex, sexual orientation, or gender identity.            Thank you!     Thank you for choosing [facility]  for your care. Our goal is always to provide you with excellent care. Hearing back from our patients is one way we can continue to improve our services. Please take a few minutes to complete the written survey that you may receive in the mail after your visit with us. Thank you!             Your Updated Medication List - Protect others around you: Learn how to safely use, store and throw away your medicines at www.disposemymeds.org.          This list is accurate as of 9/6/18  3:40 PM.  Always use your most recent med list.                   Brand Name Dispense Instructions for use Diagnosis    ADVIL PO      Take 800 mg by mouth every 6 hours as needed for moderate pain        aspirin 81 MG tablet      Take  by mouth daily.        COMPRESSION STOCKINGS     2 each    1 each daily Wear daily size large, two pair    Peripheral edema       doxycycline 100 MG capsule    VIBRAMYCIN    20 capsule    Take 1 capsule (100 mg) by mouth 2 times daily    Chronic rhinitis, unspecified type, Reactive airway disease that is not asthma, Acute non-recurrent maxillary sinusitis       fluticasone 50 MCG/ACT spray    FLONASE    1 Bottle    Spray 2 sprays into both nostrils daily    Chronic rhinitis, unspecified type       montelukast 10 MG tablet    SINGULAIR    90 tablet    Take 1 tablet (10 mg) by mouth At Bedtime    Reactive airway disease  that is not asthma, Chronic rhinitis, unspecified type       predniSONE 20 MG tablet    DELTASONE    10 tablet    Take 2 tablets (40 mg) by mouth daily for 5 days    Reactive airway disease that is not asthma, Chronic rhinitis, unspecified type       VITAMIN D (CHOLECALCIFEROL) PO      Take 2,000 Units by mouth daily

## 2018-09-17 ENCOUNTER — OFFICE VISIT (OUTPATIENT)
Dept: PODIATRY | Facility: CLINIC | Age: 53
End: 2018-09-17
Payer: COMMERCIAL

## 2018-09-17 ENCOUNTER — RADIANT APPOINTMENT (OUTPATIENT)
Dept: GENERAL RADIOLOGY | Facility: CLINIC | Age: 53
End: 2018-09-17
Attending: PODIATRIST
Payer: COMMERCIAL

## 2018-09-17 VITALS
HEIGHT: 66 IN | DIASTOLIC BLOOD PRESSURE: 80 MMHG | BODY MASS INDEX: 34.07 KG/M2 | WEIGHT: 212 LBS | SYSTOLIC BLOOD PRESSURE: 122 MMHG

## 2018-09-17 DIAGNOSIS — Q66.6 PES VALGUS: ICD-10-CM

## 2018-09-17 DIAGNOSIS — M20.42 HAMMER TOE OF LEFT FOOT: ICD-10-CM

## 2018-09-17 DIAGNOSIS — M77.9 CAPSULITIS: Primary | ICD-10-CM

## 2018-09-17 PROCEDURE — 99203 OFFICE O/P NEW LOW 30 MIN: CPT | Performed by: PODIATRIST

## 2018-09-17 PROCEDURE — 73630 X-RAY EXAM OF FOOT: CPT | Mod: TC

## 2018-09-17 ASSESSMENT — PAIN SCALES - GENERAL: PAINLEVEL: NO PAIN (1)

## 2018-09-17 NOTE — MR AVS SNAPSHOT
After Visit Summary   9/17/2018    Velma Rizvi    MRN: [id number]           Patient Information     Date Of Birth          1965        Visit Information        Provider Department      9/17/2018 1:30 PM Hal Fay, SHADI [facility]'s Diagnoses     Left foot pain    -  1    Capsulitis        Hammer toe of left foot        Pes valgus          Care Instructions    Reliable shoe stores: To maximize your experience and provide the best possible fit.  Be sure to show them your foot concerns and tell them Dr. Fay sent you.      Stores listed in bold have only athletic shoes, and stores that are not bold are mostly casual or variety of shoes    [employer]  [address] [place] [address]  [phone]    [facility] [employer] [facility] [place]  [address] [place] [address]  [phone]    TC [employer] [facility] [place]  [address] [place] [address]  [phone]    [employer]  [address]  [facility] [address]  [phone]    [employer]  [address] [place] [address]  [phone]    [employer]  [address] [place] [address]  [phone]                         [employer] Locations:     [address] [place] [address]   [phone]     [address] [place] [address] [address] [place] [address]   [phone]     [address] [place] [address]   [phone]     [address] [place] [address]   [phone]     [address] [place] [address] [place] [address]   [phone]     [address] [place] [address]   [phone]     [address]  [facility] [address]   [place] [address]   [phone]     [address]   [place], [place] [id number] [phone]             How to find reasonable shoes          The correct width    Correct Fitting    Correct Length      Foot Distortion    Posture Distortion                          Torsional Rigidity       "Grasp behind the heel and underneath the foot and twist      Bad    Excessive torsion/twist in midfoot     Less torsion/twist in midfoot is better                   Heel Counter Rigidity      Grasp just above   midsole and squeeze      Bad    Soft heel counter      Good    Rigid Heel Counter      Flexion Rigidity      Grasp shoe and bend from forefoot to rearfoot                        Follow-ups after your visit        Additional Services     ORTHOTICS REFERRAL       [place] [facility] scheduling staff may contact patient to arrange appointments for casting of orthotics and often do not leave messages.  The patient may call this number for scheduling at their convenience. Scheduling Phone [phone].      One pair custom functional foot orthotics.   Flexible polypropylene shell with 1/8\" Spenco cushioned top cover, crepe rearfoot post, medial density arch fill, MICKI bottom cover.  Aerobic model.    Add 1/16\" cork forefoot extension, sulcus length with 2-3rd met head cutout                  Who to contact     If you have questions or need follow up information about today's clinic visit or your schedule please contact [facility] directly at [phone].  Normal or non-critical lab and imaging results will be communicated to you by Crowdboosterhart, letter or phone within 4 business days after the clinic has received the results. If you do not hear from us within 7 days, please contact the clinic through [employer]dbooster[employer] or phone. If you have a critical or abnormal lab result, we will notify you by phone as soon as possible.  Submit refill requests through [employer] or call your pharmacy and they will forward the refill request to us. Please allow 3 business days for your refill to be completed.          Additional Information About Your Visit        [employer] Information     [employer] lets you send messages to your doctor, view your test results, renew your prescriptions, schedule appointments and more. To sign up, go to " "[email or web address] . Click on \"Log in\" on the left side of the screen, which will take you to the Welcome page. Then click on \"Sign up Now\" on the right side of the page.     You will be asked to enter the access code listed below, as well as some personal information. Please follow the directions to create your username and password.     Your access code is: [email or web address]  Expires: 10/10/2018  9:52 AM     Your access code will  in 90 days. If you need help or a new code, please call your [facility] clinic or [phone].        Care EveryWhere ID     This is your Care EveryWhere ID. This could be used by other organizations to access your [facility] medical records  [id number]        Your Vitals Were     Height Last Period BMI (Body Mass Index)             5' 6\" (1.676 m) 2017 34.22 kg/m2          Blood Pressure from Last 3 Encounters:   18 122/80   18 128/82   18 123/83    Weight from Last 3 Encounters:   18 212 lb (96.2 kg)   18 212 lb (96.2 kg)   17 215 lb (97.5 kg)              We Performed the Following     ORTHOTICS REFERRAL     XR Foot Left G/E 3 Views        Primary Care Provider Office Phone # Fax #    Rhoda FARZANA Antony PA-C [phone] [address] [place] [address]        Equal Access to Services     [facility] : Hadii aad ku hadasho Soomaali, waaxda luqadaha, qaybta kaalmada adeegyada, waxay jose l elmore . So [facility] [phone].    ATENCIÓN: Si habla parvezañol, tiene a rome disposición servicios gratuitos de asistencia lingüística. Llame al [phone].    We comply with applicable federal civil rights laws and [place] laws. We do not discriminate on the basis of race, color, national origin, age, disability, sex, sexual orientation, or gender identity.            Thank you!     Thank you for choosing [facility]  for your care. Our goal is always to provide you with excellent care. Hearing " back from our patients is one way we can continue to improve our services. Please take a few minutes to complete the written survey that you may receive in the mail after your visit with us. Thank you!             Your Updated Medication List - Protect others around you: Learn how to safely use, store and throw away your medicines at www.disposemymeds.org.          This list is accurate as of 9/17/18  2:11 PM.  Always use your most recent med list.                   Brand Name Dispense Instructions for use Diagnosis    ADVIL PO      Take 800 mg by mouth every 6 hours as needed for moderate pain        aspirin 81 MG tablet      Take  by mouth daily.        COMPRESSION STOCKINGS     2 each    1 each daily Wear daily size large, two pair    Peripheral edema       doxycycline 100 MG capsule    VIBRAMYCIN    20 capsule    Take 1 capsule (100 mg) by mouth 2 times daily    Chronic rhinitis, unspecified type, Reactive airway disease that is not asthma, Acute non-recurrent maxillary sinusitis       fluticasone 50 MCG/ACT spray    FLONASE    1 Bottle    Spray 2 sprays into both nostrils daily    Chronic rhinitis, unspecified type       montelukast 10 MG tablet    SINGULAIR    90 tablet    Take 1 tablet (10 mg) by mouth At Bedtime    Reactive airway disease that is not asthma, Chronic rhinitis, unspecified type       VITAMIN D (CHOLECALCIFEROL) PO      Take 2,000 Units by mouth daily

## 2018-09-17 NOTE — PATIENT INSTRUCTIONS
Reliable shoe stores: To maximize your experience and provide the best possible fit.  Be sure to show them your foot concerns and tell them Dr. Fay sent you.      Stores listed in bold have only athletic shoes, and stores that are not bold are mostly casual or variety of shoes    [employer]  [address] [place] [address]  [phone]    [facility] [employer] [facility] [place]  [address] [place] [address]  [phone]     [employer] [place]  [address] [place] [address]  [phone]    [employer]  [address] [facility] [address]  [phone]    [employer]  [address] [place], [place] [address] [phone]    [employer]  [address] [place] [address]  [phone]                         [employer] Locations:     [address] [place] [address]   [phone]     [address] [place] [address] [address] [place] [address]   [phone]     [address] [place] [address]   [phone]     [address] [facility] [place] [address]   [phone]     [address] [address]   [phone]     [address] [place] [address]   [phone]     [address] [facility] [address]   [place] [address]   [phone]     [address] [facility]   [place] [address]   [phone]             How to find reasonable shoes          The correct width    Correct Fitting    Correct Length      Foot Distortion    Posture Distortion                          Torsional Rigidity      Grasp behind the heel and underneath the foot and twist      Bad    Excessive torsion/twist in midfoot     Less torsion/twist in midfoot is better                   Heel Counter Rigidity      Grasp just above   midsole and squeeze      Bad    Soft heel counter      Good    Rigid Heel Counter      Flexion Rigidity      Grasp shoe and bend from forefoot to rearfoot

## 2018-09-17 NOTE — PROGRESS NOTES
HPI:  Velma Rizvi is a 52 year old female who is seen in consultation at the request of Rhoda Antony PA-C    Pt presents for eval of:   (Onset, Location, L/R, Character, Treatments, Injury if yes)    XR Left foot today, 2018     Onset Summer 2017, stubbed Left great and 2nd toe, still having Left 2nd toe pain. Presents today with [employer] athletic shoes.  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    Weight management plan: Patient was referred to their PCP to discuss a diet and exercise plan.     Patient to follow up with Primary Care provider regarding elevated blood pressure.    ROS:  10 point ROS neg other than the symptoms noted above in the HPI.    Patient Active Problem List   Diagnosis     Tobacco use disorder     Irregular periods     Varicose veins of legs     CARDIOVASCULAR SCREENING; LDL GOAL LESS THAN 160     Phlebitis and thrombophlebitis     Obesity, Class I, BMI 30-34.9     Peripheral edema       PAST MEDICAL HISTORY:   Past Medical History:   Diagnosis Date     Phlebitis and thrombophlebitis 10/12/2012        PAST SURGICAL HISTORY:   Past Surgical History:   Procedure Laterality Date     C NONSPECIFIC PROCEDURE      Right hand surgery- tendon repair     COLONOSCOPY N/A 2018    Procedure: COLONOSCOPY;  COLONOSCOPY;  Surgeon: Paresh Curran MD;  Location:  GI     HC TREATMENT INCOMPLETE  SURG, ANY TRIMESTER      D & C  s/p miscarriage        MEDICATIONS:   Current Outpatient Prescriptions:      aspirin 81 MG tablet, Take  by mouth daily., Disp: , Rfl:      COMPRESSION STOCKINGS, 1 each daily Wear daily size large, two pair, Disp: 2 each, Rfl: 0     fluticasone (FLONASE) 50 MCG/ACT spray, Spray 2 sprays into both nostrils daily, Disp: 1 Bottle, Rfl: 11     Ibuprofen (ADVIL PO), Take 800 mg by mouth every 6 hours as needed for moderate pain, Disp: , Rfl:      montelukast (SINGULAIR) 10 MG tablet, Take  "1 tablet (10 mg) by mouth At Bedtime, Disp: 90 tablet, Rfl: 0     VITAMIN D, CHOLECALCIFEROL, PO, Take 2,000 Units by mouth daily, Disp: , Rfl:      doxycycline (VIBRAMYCIN) 100 MG capsule, Take 1 capsule (100 mg) by mouth 2 times daily, Disp: 20 capsule, Rfl: 0     ALLERGIES:    Allergies   Allergen Reactions     No Known Drug Allergies         SOCIAL HISTORY:   Social History     Social History     Marital status:      Spouse name: Ta     Number of children: 2     Years of education: 12     Occupational History     homemaker      Social History Main Topics     Smoking status: Current Every Day Smoker     Packs/day: 0.50     Years: 15.00     Smokeless tobacco: Never Used     Alcohol use Yes      Comment: rarely     Drug use: No     Sexual activity: Yes     Partners: Male     Birth control/ protection: Surgical     Other Topics Concern     Not on file     Social History Narrative        FAMILY HISTORY:   Family History   Problem Relation Age of Onset     Cancer Mother       - lung cancer with mets.     Respiratory Father      asthma     Cancer Maternal Grandmother      breast- 48 fatal age 51     Diabetes Maternal Uncle      Hypertension Brother      Diabetes Brother         EXAM:Vitals: /80 (BP Location: Left arm, Cuff Size: Adult Large)  Ht 5' 6\" (1.676 m)  Wt 212 lb (96.2 kg)  LMP 2017  BMI 34.22 kg/m2  BMI= Body mass index is 34.22 kg/(m^2).    General appearance: Patient is alert and fully cooperative with history & exam.  No sign of distress is noted during the visit.     Psychiatric: Affect is pleasant & appropriate.  Patient appears motivated to improve health.     Respiratory: Breathing is regular & unlabored while sitting.     HEENT: Hearing is intact to spoken word.  Speech is clear.  No gross evidence of visual impairment that would impact ambulation.     Vascular: DP & PT pulses are intact & regular bilaterally.  No significant edema or varicosities noted.  CFT and " skin temperature is normal to both lower extremities.     Neurologic: Lower extremity sensation is intact to light touch.  No evidence of weakness or contracture in the lower extremities.  No evidence of neuropathy.    Dermatologic: Skin is intact to both lower extremities with adequate texture, turgor and tone about the integument.  No paronychia or evidence of soft tissue infection is noted.     Musculoskeletal: Patient is ambulatory without assistive device or brace.  Generalized forefoot valgus wide forefoot bilateral.  More hallux valgus on the right than the left.  There is metatarsus adductus elevated on the left.  Pain and swelling noted with firm palpation about the second metatarsal phalangeal joint on the left.  Negative anterior drawer on the left second MPJ when compared to the right.    Radiographs: 9/17/18, 3 views weightbearing demonstrate elevated metatarsus adductus angle, mild medial deviation of the second digit at the MPJ.  No acute cortical reaction or step-off.  No joint space narrowing or flattening or avascular necrosis of the second metatarsal head.     ASSESSMENT:       ICD-10-CM    1. Capsulitis M77.9 ORTHOTICS REFERRAL   2. Hammer toe of left foot M20.42 ORTHOTICS REFERRAL   3. Pes valgus Q66.6 ORTHOTICS REFERRAL        PLAN:  Reviewed patient's chart in [facility].      9/17/2018   Discussed long and short-term outcomes regarding treatment options.  Recommended appropriate shoe gear written instructions to dispense to provide most cushion throughout the forefoot  Order for orthotics to reduce recruitment of the metatarsal phalangeal joints  Follow-up in 5 weeks.  Discussed injections oral anti-inflammatories and surgical reconstructions.    Hal Fay DPM

## 2018-09-17 NOTE — LETTER
2018         RE: Velma Rizvi  [address] [facility] [address]        Dear Colleague,    Thank you for referring your patient, Velma Rizvi, to the [facility]. Please see a copy of my visit note below.    HPI:  Velma Rizvi is a 52 year old female who is seen in consultation at the request of Rhoda Antony PA-C    Pt presents for eval of:   (Onset, Location, L/R, Character, Treatments, Injury if yes)    XR Left foot today, 2018     Onset Summer 2017, stubbed Left great and 2nd toe, still having Left 2nd toe pain. Presents today with [employer] athletic shoes.  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    Weight management plan: Patient was referred to their PCP to discuss a diet and exercise plan.     Patient to follow up with Primary Care provider regarding elevated blood pressure.    ROS:  10 point ROS neg other than the symptoms noted above in the HPI.    Patient Active Problem List   Diagnosis     Tobacco use disorder     Irregular periods     Varicose veins of legs     CARDIOVASCULAR SCREENING; LDL GOAL LESS THAN 160     Phlebitis and thrombophlebitis     Obesity, Class I, BMI 30-34.9     Peripheral edema       PAST MEDICAL HISTORY:   Past Medical History:   Diagnosis Date     Phlebitis and thrombophlebitis 10/12/2012        PAST SURGICAL HISTORY:   Past Surgical History:   Procedure Laterality Date     C NONSPECIFIC PROCEDURE      Right hand surgery- tendon repair     COLONOSCOPY N/A 2018    Procedure: COLONOSCOPY;  COLONOSCOPY;  Surgeon: Paresh Curran MD;  Location:  GI     HC TREATMENT INCOMPLETE  SURG, ANY TRIMESTER      D & C  s/p miscarriage        MEDICATIONS:   Current Outpatient Prescriptions:      aspirin 81 MG tablet, Take  by mouth daily., Disp: , Rfl:      COMPRESSION STOCKINGS, 1 each daily Wear daily size large, two pair, Disp: 2 each, Rfl: 0      "fluticasone (FLONASE) 50 MCG/ACT spray, Spray 2 sprays into both nostrils daily, Disp: 1 Bottle, Rfl: 11     Ibuprofen (ADVIL PO), Take 800 mg by mouth every 6 hours as needed for moderate pain, Disp: , Rfl:      montelukast (SINGULAIR) 10 MG tablet, Take 1 tablet (10 mg) by mouth At Bedtime, Disp: 90 tablet, Rfl: 0     VITAMIN D, CHOLECALCIFEROL, PO, Take 2,000 Units by mouth daily, Disp: , Rfl:      doxycycline (VIBRAMYCIN) 100 MG capsule, Take 1 capsule (100 mg) by mouth 2 times daily, Disp: 20 capsule, Rfl: 0     ALLERGIES:    Allergies   Allergen Reactions     No Known Drug Allergies         SOCIAL HISTORY:   Social History     Social History     Marital status:      Spouse name: Ta     Number of children: 2     Years of education: 12     Occupational History     homemaker      Social History Main Topics     Smoking status: Current Every Day Smoker     Packs/day: 0.50     Years: 15.00     Smokeless tobacco: Never Used     Alcohol use Yes      Comment: rarely     Drug use: No     Sexual activity: Yes     Partners: Male     Birth control/ protection: Surgical     Other Topics Concern     Not on file     Social History Narrative        FAMILY HISTORY:   Family History   Problem Relation Age of Onset     Cancer Mother       - lung cancer with mets.     Respiratory Father      asthma     Cancer Maternal Grandmother      breast- 48 fatal age 51     Diabetes Maternal Uncle      Hypertension Brother      Diabetes Brother         EXAM:Vitals: /80 (BP Location: Left arm, Cuff Size: Adult Large)  Ht 5' 6\" (1.676 m)  Wt 212 lb (96.2 kg)  LMP 2017  BMI 34.22 kg/m2  BMI= Body mass index is 34.22 kg/(m^2).    General appearance: Patient is alert and fully cooperative with history & exam.  No sign of distress is noted during the visit.     Psychiatric: Affect is pleasant & appropriate.  Patient appears motivated to improve health.     Respiratory: Breathing is regular & unlabored while " sitting.     HEENT: Hearing is intact to spoken word.  Speech is clear.  No gross evidence of visual impairment that would impact ambulation.     Vascular: DP & PT pulses are intact & regular bilaterally.  No significant edema or varicosities noted.  CFT and skin temperature is normal to both lower extremities.     Neurologic: Lower extremity sensation is intact to light touch.  No evidence of weakness or contracture in the lower extremities.  No evidence of neuropathy.    Dermatologic: Skin is intact to both lower extremities with adequate texture, turgor and tone about the integument.  No paronychia or evidence of soft tissue infection is noted.     Musculoskeletal: Patient is ambulatory without assistive device or brace.  Generalized forefoot valgus wide forefoot bilateral.  More hallux valgus on the right than the left.  There is metatarsus adductus elevated on the left.  Pain and swelling noted with firm palpation about the second metatarsal phalangeal joint on the left.  Negative anterior drawer on the left second MPJ when compared to the right.    Radiographs: 9/17/18, 3 views weightbearing demonstrate elevated metatarsus adductus angle, mild medial deviation of the second digit at the MPJ.  No acute cortical reaction or step-off.  No joint space narrowing or flattening or avascular necrosis of the second metatarsal head.     ASSESSMENT:       ICD-10-CM    1. Capsulitis M77.9 ORTHOTICS REFERRAL   2. Hammer toe of left foot M20.42 ORTHOTICS REFERRAL   3. Pes valgus Q66.6 ORTHOTICS REFERRAL        PLAN:  Reviewed patient's chart in [facility].      9/17/2018   Discussed long and short-term outcomes regarding treatment options.  Recommended appropriate shoe gear written instructions to dispense to provide most cushion throughout the forefoot  Order for orthotics to reduce recruitment of the metatarsal phalangeal joints  Follow-up in 5 weeks.  Discussed injections oral anti-inflammatories and surgical  reconstructions.    Hal Fay DPM      Again, thank you for allowing me to participate in the care of your patient.        Sincerely,        Hal Fay DPM

## 2018-11-24 ENCOUNTER — HEALTH MAINTENANCE LETTER (OUTPATIENT)
Age: 53
End: 2018-11-24

## 2019-01-23 ENCOUNTER — APPOINTMENT (OUTPATIENT)
Dept: ULTRASOUND IMAGING | Facility: CLINIC | Age: 54
End: 2019-01-23
Payer: COMMERCIAL

## 2019-01-23 ENCOUNTER — HOSPITAL ENCOUNTER (EMERGENCY)
Facility: CLINIC | Age: 54
Discharge: HOME | End: 2019-01-23
Attending: FAMILY MEDICINE | Admitting: FAMILY MEDICINE
Payer: COMMERCIAL

## 2019-01-23 VITALS
RESPIRATION RATE: 14 BRPM | DIASTOLIC BLOOD PRESSURE: 86 MMHG | OXYGEN SATURATION: 100 % | WEIGHT: 211.64 LBS | BODY MASS INDEX: 34.16 KG/M2 | SYSTOLIC BLOOD PRESSURE: 157 MMHG | TEMPERATURE: 96 F

## 2019-01-23 DIAGNOSIS — K80.50 BILIARY COLIC: ICD-10-CM

## 2019-01-23 LAB
ALBUMIN SERPL-MCNC: 4.1 G/DL (ref 3.4–5)
ALBUMIN UR-MCNC: NEGATIVE MG/DL
ALP SERPL-CCNC: 76 U/L (ref 40–150)
ALT SERPL W P-5'-P-CCNC: 22 U/L (ref 0–50)
ANION GAP SERPL CALCULATED.3IONS-SCNC: 7 MMOL/L (ref 3–14)
APPEARANCE UR: CLEAR
AST SERPL W P-5'-P-CCNC: 18 U/L (ref 0–45)
BASOPHILS # BLD AUTO: 0.1 10E9/L (ref 0–0.2)
BASOPHILS NFR BLD AUTO: 0.5 %
BILIRUB SERPL-MCNC: 0.2 MG/DL (ref 0.2–1.3)
BILIRUB UR QL STRIP: NEGATIVE
BUN SERPL-MCNC: 12 MG/DL (ref 7–30)
CALCIUM SERPL-MCNC: 9.2 MG/DL (ref 8.5–10.1)
CHLORIDE SERPL-SCNC: 103 MMOL/L (ref 94–109)
CO2 SERPL-SCNC: 28 MMOL/L (ref 20–32)
COLOR UR AUTO: ABNORMAL
CREAT SERPL-MCNC: 0.67 MG/DL (ref 0.52–1.04)
DIFFERENTIAL METHOD BLD: NORMAL
EOSINOPHIL NFR BLD AUTO: 1.7 %
ERYTHROCYTE [DISTWIDTH] IN BLOOD BY AUTOMATED COUNT: 12.4 % (ref 10–15)
GFR SERPL CREATININE-BSD FRML MDRD: >90 ML/MIN/{1.73_M2}
GLUCOSE SERPL-MCNC: 97 MG/DL (ref 70–99)
GLUCOSE UR STRIP-MCNC: NEGATIVE MG/DL
HCT VFR BLD AUTO: 42 % (ref 35–47)
HGB BLD-MCNC: 14.3 G/DL (ref 11.7–15.7)
HGB UR QL STRIP: NEGATIVE
IMM GRANULOCYTES # BLD: 0.1 10E9/L (ref 0–0.4)
IMM GRANULOCYTES NFR BLD: 0.5 %
KETONES UR STRIP-MCNC: NEGATIVE MG/DL
LEUKOCYTE ESTERASE UR QL STRIP: NEGATIVE
LIPASE SERPL-CCNC: 95 U/L (ref 73–393)
LYMPHOCYTES # BLD AUTO: 2.9 10E9/L (ref 0.8–5.3)
LYMPHOCYTES NFR BLD AUTO: 25.8 %
MCH RBC QN AUTO: 30.8 PG (ref 26.5–33)
MCHC RBC AUTO-ENTMCNC: 34 G/DL (ref 31.5–36.5)
MCV RBC AUTO: 90 FL (ref 78–100)
MONOCYTES # BLD AUTO: 0.9 10E9/L (ref 0–1.3)
MONOCYTES NFR BLD AUTO: 8.1 %
NEUTROPHILS # BLD AUTO: 7 10E9/L (ref 1.6–8.3)
NEUTROPHILS NFR BLD AUTO: 63.4 %
NITRATE UR QL: NEGATIVE
NRBC # BLD AUTO: 0 10*3/UL
NRBC BLD AUTO-RTO: 0 /100
PH UR STRIP: 7 PH (ref 5–7)
PLATELET # BLD AUTO: 284 10E9/L (ref 150–450)
POTASSIUM SERPL-SCNC: 3.6 MMOL/L (ref 3.4–5.3)
PROT SERPL-MCNC: 7.6 G/DL (ref 6.8–8.8)
RBC # BLD AUTO: 4.65 10E12/L (ref 3.8–5.2)
RBC #/AREA URNS AUTO: 0 /HPF (ref 0–2)
SODIUM SERPL-SCNC: 138 MMOL/L (ref 133–144)
SOURCE: ABNORMAL
SP GR UR STRIP: 1 (ref 1–1.03)
UROBILINOGEN UR STRIP-MCNC: 0 MG/DL (ref 0–2)
WBC # BLD AUTO: 11 10E9/L (ref 4–11)
WBC #/AREA URNS AUTO: <1 /HPF (ref 0–5)

## 2019-01-23 PROCEDURE — 85025 COMPLETE CBC W/AUTO DIFF WBC: CPT | Performed by: FAMILY MEDICINE

## 2019-01-23 PROCEDURE — 81001 URINALYSIS AUTO W/SCOPE: CPT | Performed by: FAMILY MEDICINE

## 2019-01-23 PROCEDURE — 99284 EMERGENCY DEPT VISIT MOD MDM: CPT | Mod: 25 | Performed by: FAMILY MEDICINE

## 2019-01-23 PROCEDURE — 99284 EMERGENCY DEPT VISIT MOD MDM: CPT | Mod: Z6 | Performed by: FAMILY MEDICINE

## 2019-01-23 PROCEDURE — 36415 COLL VENOUS BLD VENIPUNCTURE: CPT | Performed by: FAMILY MEDICINE

## 2019-01-23 PROCEDURE — 76705 ECHO EXAM OF ABDOMEN: CPT

## 2019-01-23 PROCEDURE — 83690 ASSAY OF LIPASE: CPT | Performed by: FAMILY MEDICINE

## 2019-01-23 PROCEDURE — 80053 COMPREHEN METABOLIC PANEL: CPT | Performed by: FAMILY MEDICINE

## 2019-01-23 NOTE — ED TRIAGE NOTES
"Presents with intermittent epigastric mariama that she describes as sharp at times. Symptoms started about 5 days ago. She thought it was gas as she states\"I was burping and farting but it ain't going away\"  "

## 2019-01-23 NOTE — ED AVS SNAPSHOT
[facility] Emergency Department  [address] [place] [address]  Phone:  [phone]  Fax:  [phone]                                    Velma Rizvi   MRN: [id number]    Department:  [facility] Emergency Department   Date of Visit:  1/23/2019           After Visit Summary Signature Page    I have received my discharge instructions, and my questions have been answered. I have discussed any challenges I see with this plan with the nurse or doctor.    ..........................................................................................................................................  Patient/Patient Representative Signature      ..........................................................................................................................................  Patient Representative Print Name and Relationship to Patient    ..................................................               ................................................  Date                                   Time    ..........................................................................................................................................  Reviewed by Signature/Title    ...................................................              ..............................................  Date                                               Time          22EPIC Rev 08/18

## 2019-01-23 NOTE — ED PROVIDER NOTES
History     Chief Complaint   Patient presents with     Abdominal Pain     HPI  Velma Rizvi is a 53 year old female who presents with intermittent abdominal pain this been going on for the past 5 days.  Patient states the pain will just come and go and lashes for a few minutes at a time.  Nothing she can do seems to bring the pain on or make it better or worse.  It is not affected by eating.  The pain is located in the upper abdomen and it does not radiate anywhere.  She is never had pain like this before.  There is been no nausea or vomiting.  Patient has had normal bowel movements recently.    Allergies:  Allergies   Allergen Reactions     No Known Drug Allergies        Problem List:    Patient Active Problem List    Diagnosis Date Noted     Peripheral edema 2018     Priority: Medium     Obesity, Class I, BMI 30-34.9 2017     Priority: Medium     Phlebitis and thrombophlebitis 10/12/2012     Priority: Medium     See US       Irregular periods 2011     Priority: Medium     Varicose veins of legs 2011     Priority: Medium     See abnormal US study.       CARDIOVASCULAR SCREENING; LDL GOAL LESS THAN 160 2011     Priority: Medium     Tobacco use disorder 2008     Priority: Medium        Past Medical History:    Past Medical History:   Diagnosis Date     Phlebitis and thrombophlebitis 10/12/2012       Past Surgical History:    Past Surgical History:   Procedure Laterality Date     C NONSPECIFIC PROCEDURE      Right hand surgery- tendon repair     COLONOSCOPY N/A 2018    Procedure: COLONOSCOPY;  COLONOSCOPY;  Surgeon: Paresh Curran MD;  Location:  GI      TREATMENT INCOMPLETE  SURG, ANY TRIMESTER      D & C  s/p miscarriage       Family History:    Family History   Problem Relation Age of Onset     Cancer Mother          - lung cancer with mets.     Respiratory Father         asthma     Cancer Maternal Grandmother         breast- 48 fatal age 51      Diabetes Maternal Uncle      Hypertension Brother      Diabetes Brother        Social History:  Marital Status:   [2]  Social History     Tobacco Use     Smoking status: Current Every Day Smoker     Packs/day: 0.50     Years: 15.00     Pack years: 7.50     Smokeless tobacco: Never Used   Substance Use Topics     Alcohol use: Yes     Comment: rarely     Drug use: No        Medications:      aspirin 81 MG tablet   COMPRESSION STOCKINGS   fluticasone (FLONASE) 50 MCG/ACT spray   Ibuprofen (ADVIL PO)   montelukast (SINGULAIR) 10 MG tablet   VITAMIN D, CHOLECALCIFEROL, PO         Review of Systems   All other systems reviewed and are negative.      Physical Exam   BP: 168/88  Heart Rate: 87  Temp: 96  F (35.6  C)  Resp: 14  Weight: 96 kg (211 lb 10.3 oz)  SpO2: 98 %      Physical Exam   Constitutional: She is oriented to person, place, and time. She appears well-developed and well-nourished. No distress.   HENT:   Head: Normocephalic and atraumatic.   Nose: Nose normal.   Mouth/Throat: Oropharynx is clear and moist. No oropharyngeal exudate.   Eyes: Conjunctivae are normal.   Neck: Normal range of motion. Neck supple.   Cardiovascular: Normal rate, regular rhythm and normal heart sounds. Exam reveals no friction rub.   No murmur heard.  Pulmonary/Chest: Effort normal and breath sounds normal. No stridor. No respiratory distress. She has no wheezes. She has no rales.   Abdominal: Soft. Bowel sounds are normal. She exhibits no distension and no mass. There is no tenderness. There is no guarding.   Musculoskeletal: Normal range of motion. She exhibits no tenderness.   Neurological: She is alert and oriented to person, place, and time.   Skin: Skin is warm and dry. Capillary refill takes less than 2 seconds. She is not diaphoretic. No erythema.   Psychiatric: Judgment normal.   Nursing note and vitals reviewed.      ED Course        Procedures             Results for orders placed or performed during the hospital  encounter of 01/23/19 (from the past 24 hour(s))   UA with Microscopic   Result Value Ref Range    Color Urine Straw     Appearance Urine Clear     Glucose Urine Negative NEG^Negative mg/dL    Bilirubin Urine Negative NEG^Negative    Ketones Urine Negative NEG^Negative mg/dL    Specific Gravity Urine 1.002 (L) 1.003 - 1.035    Blood Urine Negative NEG^Negative    pH Urine 7.0 5.0 - 7.0 pH    Protein Albumin Urine Negative NEG^Negative mg/dL    Urobilinogen mg/dL 0.0 0.0 - 2.0 mg/dL    Nitrite Urine Negative NEG^Negative    Leukocyte Esterase Urine Negative NEG^Negative    Source Midstream Urine     WBC Urine <1 0 - 5 /HPF    RBC Urine 0 0 - 2 /HPF   CBC with platelets differential   Result Value Ref Range    WBC 11.0 4.0 - 11.0 10e9/L    RBC Count 4.65 3.8 - 5.2 10e12/L    Hemoglobin 14.3 11.7 - 15.7 g/dL    Hematocrit 42.0 35.0 - 47.0 %    MCV 90 78 - 100 fl    MCH 30.8 26.5 - 33.0 pg    MCHC 34.0 31.5 - 36.5 g/dL    RDW 12.4 10.0 - 15.0 %    Platelet Count 284 150 - 450 10e9/L    Diff Method Automated Method     % Neutrophils 63.4 %    % Lymphocytes 25.8 %    % Monocytes 8.1 %    % Eosinophils 1.7 %    % Basophils 0.5 %    % Immature Granulocytes 0.5 %    Nucleated RBCs 0 0 /100    Absolute Neutrophil 7.0 1.6 - 8.3 10e9/L    Absolute Lymphocytes 2.9 0.8 - 5.3 10e9/L    Absolute Monocytes 0.9 0.0 - 1.3 10e9/L    Absolute Basophils 0.1 0.0 - 0.2 10e9/L    Abs Immature Granulocytes 0.1 0 - 0.4 10e9/L    Absolute Nucleated RBC 0.0    Comprehensive metabolic panel   Result Value Ref Range    Sodium 138 133 - 144 mmol/L    Potassium 3.6 3.4 - 5.3 mmol/L    Chloride 103 94 - 109 mmol/L    Carbon Dioxide 28 20 - 32 mmol/L    Anion Gap 7 3 - 14 mmol/L    Glucose 97 70 - 99 mg/dL    Urea Nitrogen 12 7 - 30 mg/dL    Creatinine 0.67 0.52 - 1.04 mg/dL    GFR Estimate >90 >60 mL/min/[1.73_m2]    GFR Estimate If Black >90 >60 mL/min/[1.73_m2]    Calcium 9.2 8.5 - 10.1 mg/dL    Bilirubin Total 0.2 0.2 - 1.3 mg/dL    Albumin 4.1  3.4 - 5.0 g/dL    Protein Total 7.6 6.8 - 8.8 g/dL    Alkaline Phosphatase 76 40 - 150 U/L    ALT 22 0 - 50 U/L    AST 18 0 - 45 U/L   Lipase   Result Value Ref Range    Lipase 95 73 - 393 U/L   US Abdomen Limited (RUQ)    Narrative    RIGHT UPPER QUADRANT ULTRASOUND 1/23/2019 10:39 AM    HISTORY:  Upper abdominal pain.    COMPARISON: None.    FINDINGS:    Gallbladder: There is a small amount of sludge and debris in the  gallbladder lumen. The gallbladder wall is not thickened and measures  2 mm in thickness. There is no sonographic Parikh sign.    Bile ducts:   CHD is normal diameter.  No intrahepatic biliary  dilatation.    Liver:  Unremarkable    Pancreas:  Mildly increased echogenicity but overall no definite  abnormality.    Right kidney:  Unremarkable    Abdominal aorta/inferior vena cava Unremarkable      Impression    IMPRESSION:  Small amount of sludge and debris in the gallbladder.    DAISY MORAN MD       Medications - No data to display    Exam was benign and vitals were stable.  Labs were normal and so I did do an ultrasound for further evaluation of this belly pain.  Ultrasound did show a small amount of sludge and debris in the gallbladder, this certainly could be the cause of her discomfort.  At this point I think it is safe to discharge the patient home.  We will put her on a low-fat/no fat diet.  Will make a follow-up appointment with general surgery for further management of this.    Assessments & Plan (with Medical Decision Making)  Biliary colic     I have reviewed the nursing notes.    I have reviewed the findings, diagnosis, plan and need for follow up with the patient.              1/23/2019   [facility] EMERGENCY DEPARTMENT     Jaiden Keen MD  01/23/19 2391

## 2019-01-28 ENCOUNTER — TELEPHONE (OUTPATIENT)
Dept: SURGERY | Facility: CLINIC | Age: 54
End: 2019-01-28

## 2019-01-28 ENCOUNTER — OFFICE VISIT (OUTPATIENT)
Dept: SURGERY | Facility: CLINIC | Age: 54
End: 2019-01-28
Payer: COMMERCIAL

## 2019-01-28 VITALS
TEMPERATURE: 98.5 F | BODY MASS INDEX: 30.51 KG/M2 | HEIGHT: 69 IN | WEIGHT: 206 LBS | SYSTOLIC BLOOD PRESSURE: 136 MMHG | DIASTOLIC BLOOD PRESSURE: 82 MMHG

## 2019-01-28 DIAGNOSIS — K80.20 SYMPTOMATIC CHOLELITHIASIS: Primary | ICD-10-CM

## 2019-01-28 PROCEDURE — 99204 OFFICE O/P NEW MOD 45 MIN: CPT | Performed by: SURGERY

## 2019-01-28 RX ORDER — CEFAZOLIN SODIUM 1 G/50ML
1 SOLUTION INTRAVENOUS SEE ADMIN INSTRUCTIONS
Status: CANCELLED | OUTPATIENT
Start: 2019-01-28

## 2019-01-28 RX ORDER — CEFAZOLIN SODIUM 2 G/100ML
2 INJECTION, SOLUTION INTRAVENOUS
Status: CANCELLED | OUTPATIENT
Start: 2019-01-28

## 2019-01-28 ASSESSMENT — MIFFLIN-ST. JEOR: SCORE: 1613.79

## 2019-01-28 NOTE — PROGRESS NOTES
Patient seen in consultation for Biliary colic, cholelithiasis by [facility]    HPI:  Patient is a 53 year old female with recent visit to the ED for Bloating, nausea and pain. She states that the pain is intermittent but at times severe. She denies association with food but does state that she gets bloated after eating most meals. No acholic stools or dark urine. No indication in ED of choledocholithiasis or acute cholecystitis. She has had a colonosocopy.    Review Of Systems    Skin: negative  Ears/Nose/Throat: negative  Respiratory: No shortness of breath, dyspnea on exertion, cough, or hemoptysis  Cardiovascular: negative  Gastrointestinal: as above  Genitourinary: negative  Musculoskeletal: negative  Neurologic: negative  Hematologic/Lymphatic/Immunologic: negative  Endocrine: negative      Past Medical History:   Diagnosis Date     Phlebitis and thrombophlebitis 10/12/2012       Past Surgical History:   Procedure Laterality Date     C NONSPECIFIC PROCEDURE      Right hand surgery- tendon repair     COLONOSCOPY N/A 2018    Procedure: COLONOSCOPY;  COLONOSCOPY;  Surgeon: Paresh Curran MD;  Location:  GI     HC TREATMENT INCOMPLETE  SURG, ANY TRIMESTER      D & C  s/p miscarriage       Family History   Problem Relation Age of Onset     Cancer Mother          - lung cancer with mets.     Respiratory Father         asthma     Cancer Maternal Grandmother         breast- 48 fatal age 51     Diabetes Maternal Uncle      Hypertension Brother      Diabetes Brother        Social History     Socioeconomic History     Marital status:      Spouse name: Ta     Number of children: 2     Years of education: 12     Highest education level: Not on file   Social Needs     Financial resource strain: Not on file     Food insecurity - worry: Not on file     Food insecurity - inability: Not on file     Transportation needs - medical: Not on file     Transportation needs - non-medical: Not on  "file   Occupational History     Occupation: homemaker   Tobacco Use     Smoking status: Current Every Day Smoker     Packs/day: 0.50     Years: 15.00     Pack years: 7.50     Smokeless tobacco: Never Used   Substance and Sexual Activity     Alcohol use: Yes     Comment: rarely     Drug use: No     Sexual activity: Yes     Partners: Male     Birth control/protection: Surgical   Other Topics Concern     Parent/sibling w/ CABG, MI or angioplasty before 65F 55M? Not Asked   Social History Narrative     Not on file       Current Outpatient Medications   Medication Sig Dispense Refill     aspirin 81 MG tablet Take  by mouth daily.       COMPRESSION STOCKINGS 1 each daily Wear daily size large, two pair 2 each 0     fluticasone (FLONASE) 50 MCG/ACT spray Spray 2 sprays into both nostrils daily 1 Bottle 11     Ibuprofen (ADVIL PO) Take 800 mg by mouth every 6 hours as needed for moderate pain       montelukast (SINGULAIR) 10 MG tablet Take 1 tablet (10 mg) by mouth At Bedtime 90 tablet 0     VITAMIN D, CHOLECALCIFEROL, PO Take 2,000 Units by mouth daily         Medications and history reviewed    Physical exam:  Vitals: /82   Temp 98.5  F (36.9  C) (Temporal)   Ht 1.753 m (5' 9\")   Wt 93.4 kg (206 lb)   LMP 03/14/2017   BMI 30.42 kg/m    BMI= Body mass index is 30.42 kg/m .    Constitutional: Healthy, alert, non-distressed   Head: Normo-cephalic, atraumatic, no lesions, masses or tenderness   Cardiovascular: RRR, no new murmurs, +S1, +S2 heart sounds, no clicks, rubs or gallops   Respiratory: CTAB, no rales, rhonchi or wheezing, equal chest rise, good respiratory effort   Gastrointestinal: Soft, negative barnett's sign, non distended, no rebound rigidity or guarding, no masses or hernias palpated   : Deferred  Musculoskeletal: Moves all extremities, normal  strength, no deformities noted   Skin: No suspicious lesions or rashes   Psychiatric: Mentation appears normal, affect appropriate "   Hematologic/Lymphatic/Immunologic: Normal cervical and supraclavicular lymph nodes   Patient able to get up on table without difficulty.    Labs show:  No results found for this or any previous visit (from the past 24 hour(s)).    Imaging shows:  Recent Results (from the past 744 hour(s))   US Abdomen Limited (RUQ)    Narrative    RIGHT UPPER QUADRANT ULTRASOUND 1/23/2019 10:39 AM    HISTORY:  Upper abdominal pain.    COMPARISON: None.    FINDINGS:    Gallbladder: There is a small amount of sludge and debris in the  gallbladder lumen. The gallbladder wall is not thickened and measures  2 mm in thickness. There is no sonographic Parikh sign.    Bile ducts:   CHD is normal diameter.  No intrahepatic biliary  dilatation.    Liver:  Unremarkable    Pancreas:  Mildly increased echogenicity but overall no definite  abnormality.    Right kidney:  Unremarkable    Abdominal aorta/inferior vena cava Unremarkable      Impression    IMPRESSION:  Small amount of sludge and debris in the gallbladder.    DAISY MORAN MD         Assessment:     ICD-10-CM    1. Symptomatic cholelithiasis K80.20 [facility]-Operative Worksheet - Laparoscopic Cholecystectomy     Plan: Velma has biliary colic associate with small stones and sludge in her gallbladder. I recommend laparoscopic cholecystectomy, possible open. We did discuss possible further testing if she was unsure of the source of her issues such as EGD or HIDA but after our discussion we agreed that her gallbladder is the likely source and we will schedule the surgery. Discussed imaging findings and what to expect with surgery. Risks of bleeding, infection, anesthesia complications, conversion to open, injury to nearby structures and bile duct injury all discussed.   We went over my discharge instructions and post-op restrictions.  Patient questions answered and we will schedule the procedure.      Rob Gonzales, DO

## 2019-01-28 NOTE — TELEPHONE ENCOUNTER
Type of surgery: Laparoscopic Cholecystectomy, possible open  Location of surgery: [facility] OR  Date and time of surgery: 2/11  Surgeon: Janet  Pre-Op Appt Date: 2/7  Post-Op Appt Date: 2/22   Packet sent out: Yes  Pre-cert/Authorization completed:  Not Applicable  Date: na

## 2019-01-28 NOTE — LETTER
2019         RE: Velma Rizvi  [address] [facility] [address]        Dear Colleague,    Thank you for referring your patient, Velma Rizvi, to the [facility]. Please see a copy of my visit note below.    Patient seen in consultation for Biliary colic, cholelithiasis by [facility]    HPI:  Patient is a 53 year old female with recent visit to the ED for Bloating, nausea and pain. She states that the pain is intermittent but at times severe. She denies association with food but does state that she gets bloated after eating most meals. No acholic stools or dark urine. No indication in ED of choledocholithiasis or acute cholecystitis. She has had a colonosocopy.    Review Of Systems    Skin: negative  Ears/Nose/Throat: negative  Respiratory: No shortness of breath, dyspnea on exertion, cough, or hemoptysis  Cardiovascular: negative  Gastrointestinal: as above  Genitourinary: negative  Musculoskeletal: negative  Neurologic: negative  Hematologic/Lymphatic/Immunologic: negative  Endocrine: negative      Past Medical History:   Diagnosis Date     Phlebitis and thrombophlebitis 10/12/2012       Past Surgical History:   Procedure Laterality Date     C NONSPECIFIC PROCEDURE      Right hand surgery- tendon repair     COLONOSCOPY N/A 2018    Procedure: COLONOSCOPY;  COLONOSCOPY;  Surgeon: Paresh Curran MD;  Location:  GI     HC TREATMENT INCOMPLETE  SURG, ANY TRIMESTER      D & C  s/p miscarriage       Family History   Problem Relation Age of Onset     Cancer Mother          - lung cancer with mets.     Respiratory Father         asthma     Cancer Maternal Grandmother         breast- 48 fatal age 51     Diabetes Maternal Uncle      Hypertension Brother      Diabetes Brother        Social History     Socioeconomic History     Marital status:      Spouse name: Ta     Number of children: 2     Years of education: 12     Highest education level: Not on  "file   Social Needs     Financial resource strain: Not on file     Food insecurity - worry: Not on file     Food insecurity - inability: Not on file     Transportation needs - medical: Not on file     Transportation needs - non-medical: Not on file   Occupational History     Occupation: homemaker   Tobacco Use     Smoking status: Current Every Day Smoker     Packs/day: 0.50     Years: 15.00     Pack years: 7.50     Smokeless tobacco: Never Used   Substance and Sexual Activity     Alcohol use: Yes     Comment: rarely     Drug use: No     Sexual activity: Yes     Partners: Male     Birth control/protection: Surgical   Other Topics Concern     Parent/sibling w/ CABG, MI or angioplasty before 65F 55M? Not Asked   Social History Narrative     Not on file       Current Outpatient Medications   Medication Sig Dispense Refill     aspirin 81 MG tablet Take  by mouth daily.       COMPRESSION STOCKINGS 1 each daily Wear daily size large, two pair 2 each 0     fluticasone (FLONASE) 50 MCG/ACT spray Spray 2 sprays into both nostrils daily 1 Bottle 11     Ibuprofen (ADVIL PO) Take 800 mg by mouth every 6 hours as needed for moderate pain       montelukast (SINGULAIR) 10 MG tablet Take 1 tablet (10 mg) by mouth At Bedtime 90 tablet 0     VITAMIN D, CHOLECALCIFEROL, PO Take 2,000 Units by mouth daily         Medications and history reviewed    Physical exam:  Vitals: /82   Temp 98.5  F (36.9  C) (Temporal)   Ht 1.753 m (5' 9\")   Wt 93.4 kg (206 lb)   LMP 03/14/2017   BMI 30.42 kg/m     BMI= Body mass index is 30.42 kg/m .    Constitutional: Healthy, alert, non-distressed   Head: Normo-cephalic, atraumatic, no lesions, masses or tenderness   Cardiovascular: RRR, no new murmurs, +S1, +S2 heart sounds, no clicks, rubs or gallops   Respiratory: CTAB, no rales, rhonchi or wheezing, equal chest rise, good respiratory effort   Gastrointestinal: Soft, negative barnett's sign, non distended, no rebound rigidity or guarding, no " masses or hernias palpated   : Deferred  Musculoskeletal: Moves all extremities, normal  strength, no deformities noted   Skin: No suspicious lesions or rashes   Psychiatric: Mentation appears normal, affect appropriate   Hematologic/Lymphatic/Immunologic: Normal cervical and supraclavicular lymph nodes   Patient able to get up on table without difficulty.    Labs show:  No results found for this or any previous visit (from the past 24 hour(s)).    Imaging shows:  Recent Results (from the past 744 hour(s))   US Abdomen Limited (RUQ)    Narrative    RIGHT UPPER QUADRANT ULTRASOUND 1/23/2019 10:39 AM    HISTORY:  Upper abdominal pain.    COMPARISON: None.    FINDINGS:    Gallbladder: There is a small amount of sludge and debris in the  gallbladder lumen. The gallbladder wall is not thickened and measures  2 mm in thickness. There is no sonographic Parikh sign.    Bile ducts:   CHD is normal diameter.  No intrahepatic biliary  dilatation.    Liver:  Unremarkable    Pancreas:  Mildly increased echogenicity but overall no definite  abnormality.    Right kidney:  Unremarkable    Abdominal aorta/inferior vena cava Unremarkable      Impression    IMPRESSION:  Small amount of sludge and debris in the gallbladder.    DAISY MORAN MD         Assessment:     ICD-10-CM    1. Symptomatic cholelithiasis K80.20 [facility]ilda-Operative Worksheet - Laparoscopic Cholecystectomy     Plan: Velma has biliary colic associate with small stones and sludge in her gallbladder. I recommend laparoscopic cholecystectomy, possible open. We did discuss possible further testing if she was unsure of the source of her issues such as EGD or HIDA but after our discussion we agreed that her gallbladder is the likely source and we will schedule the surgery. Discussed imaging findings and what to expect with surgery. Risks of bleeding, infection, anesthesia complications, conversion to open, injury to nearby structures and bile duct injury all  discussed.   We went over my discharge instructions and post-op restrictions.  Patient questions answered and we will schedule the procedure.      Rob Gonzales, DO      Again, thank you for allowing me to participate in the care of your patient.        Sincerely,        Rob Gonzales, DO

## 2019-02-07 ENCOUNTER — OFFICE VISIT (OUTPATIENT)
Dept: FAMILY MEDICINE | Facility: CLINIC | Age: 54
End: 2019-02-07
Payer: COMMERCIAL

## 2019-02-07 VITALS
BODY MASS INDEX: 31.1 KG/M2 | DIASTOLIC BLOOD PRESSURE: 78 MMHG | RESPIRATION RATE: 18 BRPM | WEIGHT: 210 LBS | HEART RATE: 110 BPM | SYSTOLIC BLOOD PRESSURE: 132 MMHG | OXYGEN SATURATION: 100 % | TEMPERATURE: 98.2 F | HEIGHT: 69 IN

## 2019-02-07 DIAGNOSIS — I83.893 VARICOSE VEINS OF BILATERAL LOWER EXTREMITIES WITH OTHER COMPLICATIONS: ICD-10-CM

## 2019-02-07 DIAGNOSIS — E66.811 OBESITY, CLASS I, BMI 30-34.9: ICD-10-CM

## 2019-02-07 DIAGNOSIS — F17.200 TOBACCO USE DISORDER: ICD-10-CM

## 2019-02-07 DIAGNOSIS — K80.20 SYMPTOMATIC CHOLELITHIASIS: ICD-10-CM

## 2019-02-07 DIAGNOSIS — Z23 NEED FOR VACCINATION: ICD-10-CM

## 2019-02-07 DIAGNOSIS — Z01.818 PREOP GENERAL PHYSICAL EXAM: Primary | ICD-10-CM

## 2019-02-07 PROCEDURE — 90750 HZV VACC RECOMBINANT IM: CPT | Performed by: FAMILY MEDICINE

## 2019-02-07 PROCEDURE — 90471 IMMUNIZATION ADMIN: CPT | Performed by: FAMILY MEDICINE

## 2019-02-07 PROCEDURE — 99214 OFFICE O/P EST MOD 30 MIN: CPT | Mod: 25 | Performed by: FAMILY MEDICINE

## 2019-02-07 ASSESSMENT — MIFFLIN-ST. JEOR: SCORE: 1631.93

## 2019-02-07 ASSESSMENT — PAIN SCALES - GENERAL: PAINLEVEL: NO PAIN (0)

## 2019-02-07 NOTE — PROGRESS NOTES
[address] [address]  [phone]  Dept: [phone]    PRE-OP EVALUATION:  Today's date: 2019    Velma Rizvi (: 1965) presents for pre-operative evaluation assessment as requested by Dr. Gonzales.  She requires evaluation and anesthesia risk assessment prior to undergoing surgery/procedure for treatment of gall bladder     Fax number for surgical facility:   Primary Physician: No Ref-Primary, Physician  Type of Anesthesia Anticipated: General    Patient has a Health Care Directive or Living Will:  NO    Preop Questions 2019   Date of Surgery/Procedure: 2019   Facility or Hospital where procedure/surgery will be performed: [facility]    1.  Do you have a history of Heart attack, stroke, stent, coronary bypass surgery, or other heart surgery? No   2.  Do you ever have any pain or discomfort in your chest? NO    3.  Do you have a history of  Heart Failure? No   4.   Are you troubled by shortness of breath when:  walking on a level surface, or up a slight hill, or at night? No   5.  Do you currently have a cold, bronchitis or other respiratory infection? No   6.  Do you have a cough, shortness of breath, or wheezing? No   7.  Do you sometimes get pains in the calves of your legs when you walk? No   8. Do you or anyone in your family have previous history of blood clots? YES - Left leg blood clot 9-10 years ago. Superficial.  Not a DVT (result of her varicose veins)   9.  Do you or does anyone in your family have a serious bleeding problem such as prolonged bleeding following surgeries or cuts? No   10. Have you ever had problems with anemia or been told to take iron pills? No   11. Have you had any abnormal blood loss such as black, tarry or bloody stools, or abnormal vaginal bleeding? No   12. Have you ever had a blood transfusion? No   13. Have you or any of your relatives ever had problems with anesthesia? No   14. Do you have sleep apnea,  "excessive snoring or daytime drowsiness? No   15. Do you have any prosthetic heart valves? No   16. Do you have prosthetic joints? No   17. Is there any chance that you may be pregnant? No         HPI:     HPI related to upcoming procedure: per surgical consult:  \"Patient is a 53 year old female with recent visit to the ED for Bloating, nausea and pain. She states that the pain is intermittent but at times severe. She denies association with food but does state that she gets bloated after eating most meals. No acholic stools or dark urine. No indication in ED of choledocholithiasis or acute cholecystitis. She has had a colonosocopy.\"      See problem list for active medical problems.  Problems all longstanding and stable, except as noted/documented.  See ROS for pertinent symptoms related to these conditions.                                                                                                                                                          .    MEDICAL HISTORY:     Patient Active Problem List    Diagnosis Date Noted     Peripheral edema 2018     Priority: Medium     Obesity, Class I, BMI 30-34.9 2017     Priority: Medium     Irregular periods 2011     Priority: Medium     Varicose veins of legs 2011     Priority: Medium     See abnormal US study.       Tobacco use disorder 2008     Priority: Medium      Past Medical History:   Diagnosis Date     Phlebitis and thrombophlebitis 10/12/2012     PONV (postoperative nausea and vomiting)      Past Surgical History:   Procedure Laterality Date     C NONSPECIFIC PROCEDURE      Right hand surgery- tendon repair     COLONOSCOPY N/A 2018    Procedure: COLONOSCOPY;  COLONOSCOPY;  Surgeon: Paresh Curran MD;  Location:  GI      TREATMENT INCOMPLETE  SURG, ANY TRIMESTER      D & C  s/p miscarriage     Current Outpatient Medications   Medication Sig Dispense Refill     aspirin 81 MG tablet Take  by mouth daily.   " "    COMPRESSION STOCKINGS 1 each daily Wear daily size large, two pair 2 each 0     fluticasone (FLONASE) 50 MCG/ACT spray Spray 2 sprays into both nostrils daily 1 Bottle 11     Ibuprofen (ADVIL PO) Take 800 mg by mouth every 6 hours as needed for moderate pain       VITAMIN D, CHOLECALCIFEROL, PO Take 2,000 Units by mouth daily       OTC products: None, except as noted above    Allergies   Allergen Reactions     No Known Drug Allergies       Latex Allergy: NO    Social History     Tobacco Use     Smoking status: Current Every Day Smoker     Packs/day: 0.50     Years: 15.00     Pack years: 7.50     Smokeless tobacco: Never Used   Substance Use Topics     Alcohol use: Yes     Comment: rarely     History   Drug Use No       REVIEW OF SYSTEMS:   Constitutional, neuro, ENT, endocrine, pulmonary, cardiac, gastrointestinal, genitourinary, musculoskeletal, integument and psychiatric systems are negative, except as otherwise noted.    EXAM:   /78   Pulse 110   Temp 98.2  F (36.8  C) (Temporal)   Resp 18   Ht 1.753 m (5' 9\")   Wt 95.3 kg (210 lb)   LMP 03/14/2017   SpO2 100%   Breastfeeding? No   BMI 31.01 kg/m      GENERAL APPEARANCE: healthy, alert and no distress     EYES: EOMI, PERRL     HENT: ear canals and TM's normal and nose and mouth without ulcers or lesions     NECK: no adenopathy, no asymmetry, masses, or scars and thyroid normal to palpation     RESP: lungs clear to auscultation - no rales, rhonchi or wheezes     CV: regular rates and rhythm, normal S1 S2, no S3 or S4 and no murmur, click or rub     ABDOMEN:  soft, nontender, no HSM or masses and bowel sounds normal     MS: extremities normal- no gross deformities noted, no evidence of inflammation in joints, FROM in all extremities.     SKIN: no suspicious lesions or rashes     NEURO: Normal strength and tone, sensory exam grossly normal, mentation intact and speech normal     PSYCH: mentation appears normal. and affect normal/bright     " LYMPHATICS: No cervical adenopathy    DIAGNOSTICS:   EKG: Not indicated due to non-vascular surgery and low risk of event (age <65 and without cardiac risk factors)    Recent Labs   Lab Test 01/23/19  0842 06/03/16  1105  11/20/12  1607   HGB 14.3 13.6   < > 14.1    209   < > 272   INR  --   --   --  0.90    131*   < >  --    POTASSIUM 3.6 3.4   < >  --    CR 0.67 0.72   < > 0.64    < > = values in this interval not displayed.        IMPRESSION:   Reason for surgery/procedure:    Symptomatic cholelithiasis    Diagnosis/reason for consult:   Obesity, Class I, BMI 30-34.9  Varicose veins of bilateral lower extremities with other complications  Tobacco use disorder        The proposed surgical procedure is considered INTERMEDIATE risk.    REVISED CARDIAC RISK INDEX  The patient has the following serious cardiovascular risks for perioperative complications such as (MI, PE, VFib and 3  AV Block):  No serious cardiac risks  INTERPRETATION: 0 risks: Class I (very low risk - 0.4% complication rate)    The patient has the following additional risks for perioperative complications:  No identified additional risks      ICD-10-CM    1. Preop general physical exam Z01.818    2. Symptomatic cholelithiasis K80.20    3. Obesity, Class I, BMI 30-34.9 E66.9    4. Varicose veins of bilateral lower extremities with other complications I83.893    5. Tobacco use disorder F17.200    6. Need for vaccination Z23 ZOSTER VACCINE RECOMBINANT ADJUVANTED IM NJX     ADMIN 1st VACCINE       RECOMMENDATIONS:       --Patient is to take all scheduled medications on the day of surgery EXCEPT for modifications listed below.    APPROVAL GIVEN to proceed with proposed procedure, without further diagnostic evaluation       Signed Electronically by: Gui Marques MD    Copy of this evaluation report is provided to requesting physician.    [facility] Preop Guidelines    Revised Cardiac Risk Index

## 2019-02-07 NOTE — NURSING NOTE
Screening Questionnaire for Adult Immunization    Are you sick today?   No   Do you have allergies to medications, food, a vaccine component or latex?   No   Have you ever had a serious reaction after receiving a vaccination?   No   Do you have a long-term health problem with heart disease, lung disease, asthma, kidney disease, metabolic disease (e.g. diabetes), anemia, or other blood disorder?   No   Do you have cancer, leukemia, HIV/AIDS, or any other immune system problem?   No   In the past 3 months, have you taken medications that affect  your immune system, such as prednisone, other steroids, or anticancer drugs; drugs for the treatment of rheumatoid arthritis, Crohn s disease, or psoriasis; or have you had radiation treatments?   No   Have you had a seizure, or a brain or other nervous system problem?   No   During the past year, have you received a transfusion of blood or blood     products, or been given immune (gamma) globulin or antiviral drug?   No   For women: Are you pregnant or is there a chance you could become        pregnant during the next month?   No   Have you received any vaccinations in the past 4 weeks?   No     Immunization questionnaire answers were all negative.        Per orders of Dr. Marques, injection of Shingrix given by Maura Martinez. Patient instructed to remain in clinic for 15 minutes afterwards, and to report any adverse reaction to me immediately.       Screening performed by Maura Martinez on 2/7/2019 at 8:31 AM.

## 2019-02-10 ENCOUNTER — ANESTHESIA EVENT (OUTPATIENT)
Dept: SURGERY | Facility: CLINIC | Age: 54
End: 2019-02-10
Payer: COMMERCIAL

## 2019-02-10 ASSESSMENT — LIFESTYLE VARIABLES: TOBACCO_USE: 1

## 2019-02-11 ENCOUNTER — ANESTHESIA (OUTPATIENT)
Dept: SURGERY | Facility: CLINIC | Age: 54
End: 2019-02-11
Payer: COMMERCIAL

## 2019-02-11 ENCOUNTER — HOSPITAL ENCOUNTER (OUTPATIENT)
Facility: CLINIC | Age: 54
Discharge: HOME | End: 2019-02-11
Attending: SURGERY | Admitting: SURGERY
Payer: COMMERCIAL

## 2019-02-11 VITALS
TEMPERATURE: 97.9 F | RESPIRATION RATE: 14 BRPM | OXYGEN SATURATION: 96 % | DIASTOLIC BLOOD PRESSURE: 66 MMHG | HEART RATE: 79 BPM | SYSTOLIC BLOOD PRESSURE: 98 MMHG

## 2019-02-11 DIAGNOSIS — Z90.49 S/P LAPAROSCOPIC CHOLECYSTECTOMY: Primary | ICD-10-CM

## 2019-02-11 PROCEDURE — 25000128 H RX IP 250 OP 636: Performed by: NURSE ANESTHETIST, CERTIFIED REGISTERED

## 2019-02-11 PROCEDURE — 40000306 ZZH STATISTIC PRE PROC ASSESS II: Performed by: SURGERY

## 2019-02-11 PROCEDURE — 25000566 ZZH SEVOFLURANE, EA 15 MIN: Performed by: SURGERY

## 2019-02-11 PROCEDURE — 88304 TISSUE EXAM BY PATHOLOGIST: CPT | Performed by: SURGERY

## 2019-02-11 PROCEDURE — 47562 LAPAROSCOPIC CHOLECYSTECTOMY: CPT | Performed by: SURGERY

## 2019-02-11 PROCEDURE — 37000008 ZZH ANESTHESIA TECHNICAL FEE, 1ST 30 MIN: Performed by: SURGERY

## 2019-02-11 PROCEDURE — 36000056 ZZH SURGERY LEVEL 3 1ST 30 MIN: Performed by: SURGERY

## 2019-02-11 PROCEDURE — 71000014 ZZH RECOVERY PHASE 1 LEVEL 2 FIRST HR: Performed by: SURGERY

## 2019-02-11 PROCEDURE — 25000132 ZZH RX MED GY IP 250 OP 250 PS 637: Performed by: SURGERY

## 2019-02-11 PROCEDURE — 37000009 ZZH ANESTHESIA TECHNICAL FEE, EACH ADDTL 15 MIN: Performed by: SURGERY

## 2019-02-11 PROCEDURE — 88304 TISSUE EXAM BY PATHOLOGIST: CPT | Mod: 26 | Performed by: SURGERY

## 2019-02-11 PROCEDURE — 25000128 H RX IP 250 OP 636: Performed by: SURGERY

## 2019-02-11 PROCEDURE — 27210794 ZZH OR GENERAL SUPPLY STERILE: Performed by: SURGERY

## 2019-02-11 PROCEDURE — 25000125 ZZHC RX 250: Performed by: NURSE ANESTHETIST, CERTIFIED REGISTERED

## 2019-02-11 PROCEDURE — 36000058 ZZH SURGERY LEVEL 3 EA 15 ADDTL MIN: Performed by: SURGERY

## 2019-02-11 PROCEDURE — 25000125 ZZHC RX 250: Performed by: SURGERY

## 2019-02-11 PROCEDURE — 71000027 ZZH RECOVERY PHASE 2 EACH 15 MINS: Performed by: SURGERY

## 2019-02-11 PROCEDURE — 71000015 ZZH RECOVERY PHASE 1 LEVEL 2 EA ADDTL HR: Performed by: SURGERY

## 2019-02-11 RX ORDER — KETOROLAC TROMETHAMINE 30 MG/ML
INJECTION, SOLUTION INTRAMUSCULAR; INTRAVENOUS PRN
Status: DISCONTINUED | OUTPATIENT
Start: 2019-02-11 | End: 2019-02-11

## 2019-02-11 RX ORDER — LIDOCAINE 40 MG/G
CREAM TOPICAL
Status: CANCELLED | OUTPATIENT
Start: 2019-02-11

## 2019-02-11 RX ORDER — CEFAZOLIN SODIUM 2 G/100ML
2 INJECTION, SOLUTION INTRAVENOUS
Status: COMPLETED | OUTPATIENT
Start: 2019-02-11 | End: 2019-02-11

## 2019-02-11 RX ORDER — SODIUM CHLORIDE, SODIUM LACTATE, POTASSIUM CHLORIDE, CALCIUM CHLORIDE 600; 310; 30; 20 MG/100ML; MG/100ML; MG/100ML; MG/100ML
INJECTION, SOLUTION INTRAVENOUS CONTINUOUS
Status: DISCONTINUED | OUTPATIENT
Start: 2019-02-11 | End: 2019-02-11 | Stop reason: HOSPADM

## 2019-02-11 RX ORDER — FENTANYL CITRATE 50 UG/ML
25-50 INJECTION, SOLUTION INTRAMUSCULAR; INTRAVENOUS
Status: DISCONTINUED | OUTPATIENT
Start: 2019-02-11 | End: 2019-02-11 | Stop reason: HOSPADM

## 2019-02-11 RX ORDER — MEPERIDINE HYDROCHLORIDE 25 MG/ML
12.5 INJECTION INTRAMUSCULAR; INTRAVENOUS; SUBCUTANEOUS
Status: DISCONTINUED | OUTPATIENT
Start: 2019-02-11 | End: 2019-02-11 | Stop reason: HOSPADM

## 2019-02-11 RX ORDER — LIDOCAINE HYDROCHLORIDE 20 MG/ML
INJECTION, SOLUTION INFILTRATION; PERINEURAL PRN
Status: DISCONTINUED | OUTPATIENT
Start: 2019-02-11 | End: 2019-02-11

## 2019-02-11 RX ORDER — HYDROMORPHONE HYDROCHLORIDE 1 MG/ML
.3-.5 INJECTION, SOLUTION INTRAMUSCULAR; INTRAVENOUS; SUBCUTANEOUS EVERY 10 MIN PRN
Status: DISCONTINUED | OUTPATIENT
Start: 2019-02-11 | End: 2019-02-11 | Stop reason: HOSPADM

## 2019-02-11 RX ORDER — NALOXONE HYDROCHLORIDE 0.4 MG/ML
.1-.4 INJECTION, SOLUTION INTRAMUSCULAR; INTRAVENOUS; SUBCUTANEOUS
Status: DISCONTINUED | OUTPATIENT
Start: 2019-02-11 | End: 2019-02-11 | Stop reason: HOSPADM

## 2019-02-11 RX ORDER — ONDANSETRON 4 MG/1
4 TABLET, ORALLY DISINTEGRATING ORAL EVERY 30 MIN PRN
Status: DISCONTINUED | OUTPATIENT
Start: 2019-02-11 | End: 2019-02-11 | Stop reason: HOSPADM

## 2019-02-11 RX ORDER — BUPIVACAINE HYDROCHLORIDE AND EPINEPHRINE 2.5; 5 MG/ML; UG/ML
INJECTION, SOLUTION INFILTRATION; PERINEURAL PRN
Status: DISCONTINUED | OUTPATIENT
Start: 2019-02-11 | End: 2019-02-11 | Stop reason: HOSPADM

## 2019-02-11 RX ORDER — CEFAZOLIN SODIUM 1 G/3ML
1 INJECTION, POWDER, FOR SOLUTION INTRAMUSCULAR; INTRAVENOUS SEE ADMIN INSTRUCTIONS
Status: DISCONTINUED | OUTPATIENT
Start: 2019-02-11 | End: 2019-02-11 | Stop reason: HOSPADM

## 2019-02-11 RX ORDER — FENTANYL CITRATE 50 UG/ML
INJECTION, SOLUTION INTRAMUSCULAR; INTRAVENOUS PRN
Status: DISCONTINUED | OUTPATIENT
Start: 2019-02-11 | End: 2019-02-11

## 2019-02-11 RX ORDER — DIMENHYDRINATE 50 MG/ML
25 INJECTION, SOLUTION INTRAMUSCULAR; INTRAVENOUS
Status: COMPLETED | OUTPATIENT
Start: 2019-02-11 | End: 2019-02-11

## 2019-02-11 RX ORDER — METOCLOPRAMIDE HYDROCHLORIDE 5 MG/ML
10 INJECTION INTRAMUSCULAR; INTRAVENOUS ONCE
Status: DISCONTINUED | OUTPATIENT
Start: 2019-02-11 | End: 2019-02-11 | Stop reason: HOSPADM

## 2019-02-11 RX ORDER — DEXAMETHASONE SODIUM PHOSPHATE 10 MG/ML
INJECTION, SOLUTION INTRAMUSCULAR; INTRAVENOUS PRN
Status: DISCONTINUED | OUTPATIENT
Start: 2019-02-11 | End: 2019-02-11

## 2019-02-11 RX ORDER — ONDANSETRON 2 MG/ML
INJECTION INTRAMUSCULAR; INTRAVENOUS PRN
Status: DISCONTINUED | OUTPATIENT
Start: 2019-02-11 | End: 2019-02-11

## 2019-02-11 RX ORDER — OXYCODONE AND ACETAMINOPHEN 5; 325 MG/1; MG/1
1-2 TABLET ORAL EVERY 4 HOURS PRN
Status: DISCONTINUED | OUTPATIENT
Start: 2019-02-11 | End: 2019-02-11 | Stop reason: HOSPADM

## 2019-02-11 RX ORDER — OXYCODONE AND ACETAMINOPHEN 5; 325 MG/1; MG/1
1 TABLET ORAL
Status: COMPLETED | OUTPATIENT
Start: 2019-02-11 | End: 2019-02-11

## 2019-02-11 RX ORDER — PROPOFOL 10 MG/ML
INJECTION, EMULSION INTRAVENOUS PRN
Status: DISCONTINUED | OUTPATIENT
Start: 2019-02-11 | End: 2019-02-11

## 2019-02-11 RX ORDER — OXYCODONE AND ACETAMINOPHEN 5; 325 MG/1; MG/1
1-2 TABLET ORAL EVERY 4 HOURS PRN
Qty: 16 TABLET | Refills: 0 | Status: SHIPPED | OUTPATIENT
Start: 2019-02-11 | End: 2019-02-22

## 2019-02-11 RX ORDER — ONDANSETRON 2 MG/ML
4 INJECTION INTRAMUSCULAR; INTRAVENOUS EVERY 30 MIN PRN
Status: DISCONTINUED | OUTPATIENT
Start: 2019-02-11 | End: 2019-02-11 | Stop reason: HOSPADM

## 2019-02-11 RX ADMIN — SUGAMMADEX 200 MG: 100 INJECTION, SOLUTION INTRAVENOUS at 13:23

## 2019-02-11 RX ADMIN — ONDANSETRON 4 MG: 2 INJECTION INTRAMUSCULAR; INTRAVENOUS at 13:03

## 2019-02-11 RX ADMIN — ROCURONIUM BROMIDE 40 MG: 10 INJECTION INTRAVENOUS at 12:43

## 2019-02-11 RX ADMIN — SODIUM CHLORIDE, POTASSIUM CHLORIDE, SODIUM LACTATE AND CALCIUM CHLORIDE: 600; 310; 30; 20 INJECTION, SOLUTION INTRAVENOUS at 11:55

## 2019-02-11 RX ADMIN — FENTANYL CITRATE 50 MCG: 50 INJECTION, SOLUTION INTRAMUSCULAR; INTRAVENOUS at 12:35

## 2019-02-11 RX ADMIN — HYDROMORPHONE HYDROCHLORIDE 0.3 MG: 1 INJECTION, SOLUTION INTRAMUSCULAR; INTRAVENOUS; SUBCUTANEOUS at 14:25

## 2019-02-11 RX ADMIN — SODIUM CHLORIDE, POTASSIUM CHLORIDE, SODIUM LACTATE AND CALCIUM CHLORIDE: 600; 310; 30; 20 INJECTION, SOLUTION INTRAVENOUS at 12:35

## 2019-02-11 RX ADMIN — ONDANSETRON 4 MG: 2 INJECTION INTRAMUSCULAR; INTRAVENOUS at 13:53

## 2019-02-11 RX ADMIN — KETOROLAC TROMETHAMINE 30 MG: 30 INJECTION, SOLUTION INTRAMUSCULAR at 13:19

## 2019-02-11 RX ADMIN — FENTANYL CITRATE 25 MCG: 50 INJECTION, SOLUTION INTRAMUSCULAR; INTRAVENOUS at 14:15

## 2019-02-11 RX ADMIN — PROPOFOL 200 MG: 10 INJECTION, EMULSION INTRAVENOUS at 12:43

## 2019-02-11 RX ADMIN — PROCHLORPERAZINE EDISYLATE 10 MG: 5 INJECTION INTRAMUSCULAR; INTRAVENOUS at 14:08

## 2019-02-11 RX ADMIN — DIMENHYDRINATE 25 MG: 50 INJECTION, SOLUTION INTRAMUSCULAR; INTRAVENOUS at 14:27

## 2019-02-11 RX ADMIN — FENTANYL CITRATE 50 MCG: 50 INJECTION, SOLUTION INTRAMUSCULAR; INTRAVENOUS at 12:43

## 2019-02-11 RX ADMIN — MIDAZOLAM 2 MG: 1 INJECTION INTRAMUSCULAR; INTRAVENOUS at 12:35

## 2019-02-11 RX ADMIN — LIDOCAINE HYDROCHLORIDE 1 ML: 10 INJECTION, SOLUTION EPIDURAL; INFILTRATION; INTRACAUDAL; PERINEURAL at 11:55

## 2019-02-11 RX ADMIN — OXYCODONE HYDROCHLORIDE AND ACETAMINOPHEN 1 TABLET: 5; 325 TABLET ORAL at 15:24

## 2019-02-11 RX ADMIN — LIDOCAINE HYDROCHLORIDE 100 MG: 20 INJECTION, SOLUTION INFILTRATION; PERINEURAL at 12:43

## 2019-02-11 RX ADMIN — DEXAMETHASONE SODIUM PHOSPHATE 10 MG: 10 INJECTION, SOLUTION INTRAMUSCULAR; INTRAVENOUS at 13:03

## 2019-02-11 RX ADMIN — CEFAZOLIN SODIUM 2 G: 2 INJECTION, SOLUTION INTRAVENOUS at 12:47

## 2019-02-11 NOTE — PROVIDER NOTIFICATION
Patient received Zofran, Compazine and Dramamine for nausea.  Spoke with Francisco Nguyen CRNA regarding patient's status.  Order received for Reglan.

## 2019-02-11 NOTE — ANESTHESIA PREPROCEDURE EVALUATION
Anesthesia Pre-Procedure Evaluation    Patient: Velma Rizvi   MRN: [id number] : 1965          Preoperative Diagnosis: symptomatic cholelithiasis    Procedure(s):  LAPAROSCOPIC CHOLECYSTECTOMY    Past Medical History:   Diagnosis Date     Phlebitis and thrombophlebitis 10/12/2012     PONV (postoperative nausea and vomiting)      Past Surgical History:   Procedure Laterality Date     C NONSPECIFIC PROCEDURE      Right hand surgery- tendon repair     COLONOSCOPY N/A 2018    Procedure: COLONOSCOPY;  COLONOSCOPY;  Surgeon: Paresh Curran MD;  Location: [facility]     HC TREATMENT INCOMPLETE  SURG, ANY TRIMESTER      D & C  s/p miscarriage       Anesthesia Evaluation     . Pt has had prior anesthetic. Type: General and MAC    History of anesthetic complications   - PONV        ROS/MED HX    ENT/Pulmonary:     (+)tobacco use, Current use , . .    Neurologic:  - neg neurologic ROS     Cardiovascular:  - neg cardiovascular ROS   (+) ----. : . . . :. . No previous cardiac testing       METS/Exercise Tolerance:     Hematologic:  - neg hematologic  ROS       Musculoskeletal:  - neg musculoskeletal ROS       GI/Hepatic:     (+) cholecystitis/cholelithiasis,       Renal/Genitourinary:  - ROS Renal section negative       Endo:  - neg endo ROS       Psychiatric:  - neg psychiatric ROS       Infectious Disease:  - neg infectious disease ROS       Malignancy:      - no malignancy   Other:    - neg other ROS                      Physical Exam  Normal systems: pulmonary and dental    Airway   Mallampati: II  TM distance: <3 FB  Neck ROM: full    Dental     Cardiovascular   Rhythm and rate: regular and abnormal    PE comment:  BPM    Pulmonary             Lab Results   Component Value Date    WBC 11.0 2019    HGB 14.3 2019    HCT 42.0 2019     2019    CRP <5.0 2014    SED 5 2005     2019    POTASSIUM 3.6 2019    CHLORIDE 103 2019    CO2 28  "01/23/2019    BUN 12 01/23/2019    CR 0.67 01/23/2019    GLC 97 01/23/2019    ALEKSANDRA 9.2 01/23/2019    ALBUMIN 4.1 01/23/2019    PROTTOTAL 7.6 01/23/2019    ALT 22 01/23/2019    AST 18 01/23/2019    ALKPHOS 76 01/23/2019    BILITOTAL 0.2 01/23/2019    LIPASE 95 01/23/2019    PTT 30 11/20/2012    INR 0.90 11/20/2012    FIBR 373 11/20/2012    TSH 1.46 10/03/2012       Preop Vitals  BP Readings from Last 3 Encounters:   02/07/19 132/78   01/28/19 136/82   01/23/19 157/86    Pulse Readings from Last 3 Encounters:   02/07/19 110   09/06/18 60   07/12/18 76      Resp Readings from Last 3 Encounters:   02/07/19 18   01/23/19 14   09/06/18 18    SpO2 Readings from Last 3 Encounters:   02/07/19 100%   01/23/19 100%   09/06/18 95%      Temp Readings from Last 1 Encounters:   02/07/19 98.2  F (36.8  C) (Temporal)    Ht Readings from Last 1 Encounters:   02/07/19 1.753 m (5' 9\")      Wt Readings from Last 1 Encounters:   02/07/19 95.3 kg (210 lb)    Estimated body mass index is 31.01 kg/m  as calculated from the following:    Height as of 2/7/19: 1.753 m (5' 9\").    Weight as of 2/7/19: 95.3 kg (210 lb).       Anesthesia Plan      History & Physical Review  History and physical reviewed and following examination; no interval change.    ASA Status:  2 .    NPO Status:  > 8 hours    Plan for General and ETT with Intravenous and Propofol induction. Maintenance will be Balanced.    PONV prophylaxis:  Ondansetron (or other 5HT-3) and Dexamethasone or Solumedrol       Postoperative Care  Postoperative pain management:  IV analgesics, Oral pain medications and Multi-modal analgesia.      Consents  Anesthetic plan, risks, benefits and alternatives discussed with:  Patient.  Use of blood products discussed: No .   .                 MIN Irizarry CRNA  "

## 2019-02-11 NOTE — OP NOTE
Date of Service: 2/11/2019     STAFF SURGEON: Rob Gonzales DO     ASSISTANT:  None.     PREOPERATIVE DIAGNOSIS: symptomatic cholelithiasis     POSTOPERATIVE DIAGNOSIS: symptomatic cholelithiasis     NAME OF PROCEDURE(S):  laparoscopic cholecystectomy     INDICATIONS FOR PROCEDURE:  The patient is a 53 year old female here with post-prandial bloating and sporadic epigastric pain and nausea. She was seen in an emergency department and found to have sludge and debris in her gallbladder. We discussed how her symptoms here likely associated to her gallbladder and I recommended laparoscopic cholecystectomy. I described the procedure in detail as well as the risks, benefits and alternatives and after our informed discussion we agreed to proceed with proposed procedure.     EBL: 5 cc    ANESTHESIA:  GETA    COMPLICATIONS: None immediately apparent     DRAINS:  None.     SPECIMENS: Gallbladder and contents     PROCEDURE DETAIL:  After the informed consent was obtained, the patient brought from the preoperative holding area to the operating room and placed in supine position.  She was prepped and draped in normal sterile fashion.  Timeout was performed.     After the correct patient and correct procedure was verified, I began by making a supraumbilical 5 mm incision.  A Veress needle was inserted into the peritoneal cavity, and the abdomen was insufflated to 15 mmHg.  Trocar was then inserted.  A general survey of the abdomen revealed no gross abnormalities.  An additional 11 mm trocar was placed in the subxiphoid area under direct visualization.  Two additional 5 mm trocars in the right subcostal area were placed also under direct visualization.  The patient was placed in the head up rotated left position.  Gallbladder was grasped and elevated superiorly.  Using minimal amount of electrocautery some omental and peritoneal adhesions to the Alexia's pouch were taken down.  I was able to circumferentially dissect around  the cystic duct with a curved [place] dissector.  I also isolated the cystic artery using a curved [place] dissector.  I brought the dissection posterior towards the cystic plate.  Once the entire inferior posterior edge of the gallbladder was cleared back to the liver I verified that there were 2 structures going into the gallbladder, namely the cystic duct and cystic artery; therefore I achieved the critical view of safety.  Using clip applier I clipped the stay side of the cystic duct and cystic artery twice and the specimen side once and these were both transected with EndoShears.  Using hook electrocautery, the gallbladder was removed from the liver bed without evidence of any significant bleeding.  After the gallbladder was completely removed, it was placed in an EndoCatch bag and brought through the 11 mm incision.  Another survey of the operative field revealed no blood or bile staining.        The 11 mm fascial defect was closed with 0 Vicryl suture using a PMI closure device.  Abdomen was desufflated.  All the ports were removed under direct visualization.  The skin was instilled with 0.25% Marcaine with epinephrine for local anesthesia.  The skin was closed with inverted interrupted 4-0 Monocryl sutures and Dermabond dressing was applied.  At the completion of the case, all instruments, needles and sponges were accounted for after a correct count.  The patient was then awoken from anesthesia and brought to the recovery room in stable condition.            Rob Gonzales DO

## 2019-02-11 NOTE — DISCHARGE INSTRUCTIONS
[facility]    Home Care Following Gallbladder Surgery    Dr. Gonzales      Care of the Incision:    If surgical glue was used on your incision, keep it dry for 24 hours.  Then you may shower but don t submerge under water for at least 2 weeks.  Gently pat your incision dry with a freshly laundered towel.    Do not touch your incision with bare hands or pick at scabs.    Leave your incision open to air.  Cover it only if draining, clothing rubs or irritates it.    Activity:    Gradually increase your activity.  Walk short distances several times each day and increase the distance as your strength allows.    To promote circulation, do not cross your legs while sitting.    Return to work will be determined by the type of work you do and should be discussed with your physician.    Do not drive or operate equipment while taking prescription pain medicines.  You may drive 1 week after surgery if you have stopped taking prescription pain medicines and can react quickly enough to make an emergency stop if necessary.    Diet:    Return to the diet you were on before surgery.    Drink plenty of water.    Avoid foods that cause constipation.      REMEMBER--most prescription pain pills cause constipation.  Walking, extra fluids, and increased fiber (fresh fruits and vegetables, etc.) are natural remedies for constipation.  You can also take mineral oil, 1-2 Tablespoons per day.  If still constipated you may try a stool softener such as Colace or Miralax.    Call Your Physician if You Have:    Redness, increased swelling or cloudy drainage from your incision.    A temperature of more than 101 degrees F.    Worsening pain in your incision not relieved by your prescription pain pills and/or a short rest.    Jaundice (yellowing of skin or eyes)    Abdominal distention (stomach getting very large)    Swelling in your legs    Productive cough    Burning with urination    Any questions or concerns about your  recovery, please call     Business hours [phone]    After hours [phone] Nurse Advice Line (24 hours a day)    Follow-up Care:    Make an appointment 2-3 week after your surgery if not already made.  Call [phone].    [facility] Same-Day Surgery   Adult Discharge Orders & Instructions     For 24 hours after surgery    1. Get plenty of rest.  A responsible adult must stay with you for at least 24 hours after you leave the hospital.   2. Do not drive or use heavy equipment.  If you have weakness or tingling, don't drive or use heavy equipment until this feeling goes away.  3. Do not drink alcohol.  4. Avoid strenuous or risky activities.  Ask for help when climbing stairs.   5. You may feel lightheaded.  If so, sit for a few minutes before standing.  Have someone help you get up.   6. You may have a slight fever. Call the doctor if your fever is over 100 F (37.7 C) (taken under the tongue) or lasts longer than 24 hours.  7. You may have a dry mouth, a sore throat, muscle aches or trouble sleeping.  These should go away after 24 hours.  8. Do not make important or legal decisions.  We don t expect you to have any problems from the surgery or treatment you had today. Just in case, here s what to do if you have pain, upset stomach (nausea), bleeding or infection:  Pain:  Take medicines your doctor has prescribed or over-the-counter medicine they have suggested. Resting and using ice packs can help, too. For surgery on an arm or leg, raise it on a pillow to ease swelling. Call your doctor if these methods don t work.  Copyright [employer], Licensed under [employer]  Upset stomach (nausea):  Take anti-nausea medicine approved by your doctor. Drink clear liquids like apple juice, ginger ale, broth or 7-Up. Be sure to drink enough fluids. Rest can help, too. Move to normal foods when you re ready. Bleeding:  In the first 24 hours, you may see a little blood on your dressing, about the  size of a quarter. You don t need to worry about this much blood, but if the blood spot keeps getting bigger:    Put pressure on the wound if you can, AND    Call your doctor.  Copyright [employer], Licensed under [employer]  Fever/Infection: Please call your doctor if you have any of these signs:    Redness    Swelling    Wound feels warm    Pain gets worse    Bad-smelling fluid leaks from wound    Fever or chills  Call your doctor for any of the followin.  It has been over 8 to 10 hours since surgery and you are still not able to urinate (pass water).    2.  Headache for over 24 hours.    3.  Numbness, tingling or weakness in your legs the day after surgery (if you had spinal anesthesia).    Nurse advice line: [phone]

## 2019-02-11 NOTE — ANESTHESIA POSTPROCEDURE EVALUATION
Patient: Velma Rizvi    Procedure(s):  LAPAROSCOPIC CHOLECYSTECTOMY    Diagnosis:symptomatic cholelithiasis  Diagnosis Additional Information: No value filed.    Anesthesia Type:  General, ETT    Note:  Anesthesia Post Evaluation    Patient location during evaluation: Phase 2 and Bedside  Patient participation: Able to fully participate in evaluation  Level of consciousness: awake and alert  Pain management: adequate  Airway patency: patent  Cardiovascular status: acceptable  Respiratory status: acceptable  Hydration status: acceptable  PONV: none     Anesthetic complications: None    Comments: Patient was pleased with her care today. No complications noted. Will follow as needed.        Last vitals:  Vitals:    02/11/19 1430 02/11/19 1435 02/11/19 1440   BP: 105/54 102/48 98/66   Pulse: 73 76 79   Resp: 20 14 14   Temp:      SpO2: 96% 96% 96%         Electronically Signed By: MIN Irizarry CRNA  February 11, 2019  4:08 PM

## 2019-02-11 NOTE — ANESTHESIA CARE TRANSFER NOTE
Patient: Velma Rizvi    Procedure(s):  LAPAROSCOPIC CHOLECYSTECTOMY    Diagnosis: symptomatic cholelithiasis  Diagnosis Additional Information: No value filed.    Anesthesia Type:   General, ETT     Note:  Airway :Face Mask  Patient transferred to:PACU  Handoff Report: Identifed the Patient, Identified the Reponsible Provider, Reviewed the pertinent medical history, Discussed the surgical course, Reviewed Intra-OP anesthesia mangement and issues during anesthesia, Set expectations for post-procedure period and Allowed opportunity for questions and acknowledgement of understanding      Vitals: (Last set prior to Anesthesia Care Transfer)    CRNA VITALS  2/11/2019 1258 - 2/11/2019 1338      2/11/2019             EKG:  NSR                Electronically Signed By: MIN Irizarry CRNA  February 11, 2019  1:38 PM

## 2019-02-11 NOTE — BRIEF OP NOTE
[facility] Brief Operative Note    Pre-operative diagnosis: symptomatic cholelithiasis   Post-operative diagnosis symptomatic cholelithiasis    Procedure: Procedure(s):  LAPAROSCOPIC CHOLECYSTECTOMY   Surgeon(s): Surgeon(s) and Role:     * Rob Gonzales,  - Primary   Estimated blood loss: * No values recorded between 2/11/2019 12:58 PM and 2/11/2019  1:29 PM *    Specimens: ID Type Source Tests Collected by Time Destination   A : Gallbladder and contents Tissue Gallbladder and Contents SURGICAL PATHOLOGY EXAM Rob Gonzales DO 2/11/2019  1:21 PM       Findings: See dictation

## 2019-02-12 NOTE — OR NURSING
"[facility] Same Day Surgery  Discharge Call Back  Velma Rizvi  1965  MRN: [id number]  Home: [phone] (home)   PCP: Gui Marques    We are calling to see how you're doing since your surgery/procedure with us?   Comments: doing ok  Clinical Questions  1. Have you had time to look at your discharge instructions? Do you have any questions in regards to the instructions?   Comment: no   2. Do you feel your pain is being controlled with the regimen the surgeon sent you home on? (ie: prescription medications, over the counter pain medications, ice packs)   Comments: yes, sore but managed well with meds as prescribed  3. Have you noticed any drainage on your dressing? Do you know what to do if you have bleeding as a result of your procedure?   Comments: none  4. Have you had any nausea/vomiting? Do you know how to treat this?   Comment: none  5. Have you had any signs/symptoms of infection? (ie: fever, swelling, heat, drainage or redness) Do you know what to do if you have?   Comment: none  6. Do you have a follow up appointment made with your surgeon? Do you have a number to contact them at if you need it?   Comment: yes  Recognition  Is there anyone from your care team that you would like to recognize?   Comments: yes, \"my admit nurse\" Cecilia  Improvement  Our goal is to be the best. Do you have any suggestions for things that we could improve on?   Comments: none  You may be randomly selected to fill out a [place] [facility] survey. We would appreciate you taking the time to fill this out. It is important to us if you would answer all of the questions on the survey.            "

## 2019-02-13 LAB — COPATH REPORT: NORMAL

## 2019-02-22 ENCOUNTER — OFFICE VISIT (OUTPATIENT)
Dept: SURGERY | Facility: CLINIC | Age: 54
End: 2019-02-22
Payer: COMMERCIAL

## 2019-02-22 VITALS — BODY MASS INDEX: 30.96 KG/M2 | HEIGHT: 69 IN | WEIGHT: 209 LBS | TEMPERATURE: 98.6 F

## 2019-02-22 DIAGNOSIS — Z90.49 S/P LAPAROSCOPIC CHOLECYSTECTOMY: Primary | ICD-10-CM

## 2019-02-22 PROCEDURE — 99024 POSTOP FOLLOW-UP VISIT: CPT | Performed by: SURGERY

## 2019-02-22 ASSESSMENT — MIFFLIN-ST. JEOR: SCORE: 1627.4

## 2019-02-22 NOTE — PROGRESS NOTES
General Surgery Follow Up    Pt returns for follow up visit s/p lap toro    HPI:  Doing pretty well overall. Incisions healing nicely. Minimal pain. No food intolerance but does have some gaseousness still with burping.       Past Medical History:   Diagnosis Date     Phlebitis and thrombophlebitis 10/12/2012     PONV (postoperative nausea and vomiting)        Past Surgical History:   Procedure Laterality Date     C NONSPECIFIC PROCEDURE      Right hand surgery- tendon repair     COLONOSCOPY N/A 2018    Procedure: COLONOSCOPY;  COLONOSCOPY;  Surgeon: Paresh Curran MD;  Location:  GI     HC TREATMENT INCOMPLETE  SURG, ANY TRIMESTER      D & C  s/p miscarriage     LAPAROSCOPIC CHOLECYSTECTOMY N/A 2019    Procedure: LAPAROSCOPIC CHOLECYSTECTOMY;  Surgeon: Rob Gonzales DO;  Location:  OR       Social History     Socioeconomic History     Marital status:      Spouse name: Ta     Number of children: 2     Years of education: 12     Highest education level: Not on file   Occupational History     Occupation: homemaker   Social Needs     Financial resource strain: Not on file     Food insecurity:     Worry: Not on file     Inability: Not on file     Transportation needs:     Medical: Not on file     Non-medical: Not on file   Tobacco Use     Smoking status: Current Every Day Smoker     Packs/day: 0.50     Years: 15.00     Pack years: 7.50     Smokeless tobacco: Never Used   Substance and Sexual Activity     Alcohol use: Yes     Comment: rarely     Drug use: No     Sexual activity: Yes     Partners: Male     Birth control/protection: Surgical   Lifestyle     Physical activity:     Days per week: Not on file     Minutes per session: Not on file     Stress: Not on file   Relationships     Social connections:     Talks on phone: Not on file     Gets together: Not on file     Attends Quaker service: Not on file     Active member of club or organization: Not on file     Attends  "meetings of clubs or organizations: Not on file     Relationship status: Not on file     Intimate partner violence:     Fear of current or ex partner: Not on file     Emotionally abused: Not on file     Physically abused: Not on file     Forced sexual activity: Not on file   Other Topics Concern     Parent/sibling w/ CABG, MI or angioplasty before 65F 55M? Not Asked   Social History Narrative     Not on file       Current Outpatient Medications   Medication Sig Dispense Refill     aspirin 81 MG tablet Take  by mouth daily.       COMPRESSION STOCKINGS 1 each daily Wear daily size large, two pair 2 each 0     fluticasone (FLONASE) 50 MCG/ACT spray Spray 2 sprays into both nostrils daily 1 Bottle 11     Ibuprofen (ADVIL PO) Take 800 mg by mouth every 6 hours as needed for moderate pain       VITAMIN D, CHOLECALCIFEROL, PO Take 2,000 Units by mouth daily         Medications and history reviewed    Physical exam:  Vitals: Temp 98.6  F (37  C) (Temporal)   Ht 1.753 m (5' 9\")   Wt 94.8 kg (209 lb)   LMP 03/14/2017   BMI 30.86 kg/m    BMI= Body mass index is 30.86 kg/m .    HEART: RRR, no new murmurs  LUNGS: CTAB, equal chest rise, good effort  ABD: soft, non tender, non distended  INCISIONS: c/d/i  EXT: BLACKWELL, no deformities    PATHOLOGY:  cholesterolosis    Assessment:     ICD-10-CM    1. S/P laparoscopic cholecystectomy Z90.49      Plan: Doing well. Continues to improve. Path reviewed and questions answered. She many return prn.    Rob Gonzales, DO    "

## 2019-02-22 NOTE — LETTER
2019         RE: Velma Rizvi  [address] [place] [facility] [place] [address]        Dear Colleague,    Thank you for referring your patient, Velma Rizvi, to the [facility]. Please see a copy of my visit note below.    General Surgery Follow Up    Pt returns for follow up visit s/p lap toro    HPI:  Doing pretty well overall. Incisions healing nicely. Minimal pain. No food intolerance but does have some gaseousness still with burping.       Past Medical History:   Diagnosis Date     Phlebitis and thrombophlebitis 10/12/2012     PONV (postoperative nausea and vomiting)        Past Surgical History:   Procedure Laterality Date     C NONSPECIFIC PROCEDURE      Right hand surgery- tendon repair     COLONOSCOPY N/A 2018    Procedure: COLONOSCOPY;  COLONOSCOPY;  Surgeon: Paresh Curran MD;  Location:  GI     HC TREATMENT INCOMPLETE  SURG, ANY TRIMESTER      D & C  s/p miscarriage     LAPAROSCOPIC CHOLECYSTECTOMY N/A 2019    Procedure: LAPAROSCOPIC CHOLECYSTECTOMY;  Surgeon: Rob Gonzales DO;  Location:  OR       Social History     Socioeconomic History     Marital status:      Spouse name: Ta     Number of children: 2     Years of education: 12     Highest education level: Not on file   Occupational History     Occupation: homemaker   Social Needs     Financial resource strain: Not on file     Food insecurity:     Worry: Not on file     Inability: Not on file     Transportation needs:     Medical: Not on file     Non-medical: Not on file   Tobacco Use     Smoking status: Current Every Day Smoker     Packs/day: 0.50     Years: 15.00     Pack years: 7.50     Smokeless tobacco: Never Used   Substance and Sexual Activity     Alcohol use: Yes     Comment: rarely     Drug use: No     Sexual activity: Yes     Partners: Male     Birth control/protection: Surgical   Lifestyle     Physical activity:     Days per week: Not on file     Minutes per session: Not  "on file     Stress: Not on file   Relationships     Social connections:     Talks on phone: Not on file     Gets together: Not on file     Attends Holiness service: Not on file     Active member of club or organization: Not on file     Attends meetings of clubs or organizations: Not on file     Relationship status: Not on file     Intimate partner violence:     Fear of current or ex partner: Not on file     Emotionally abused: Not on file     Physically abused: Not on file     Forced sexual activity: Not on file   Other Topics Concern     Parent/sibling w/ CABG, MI or angioplasty before 65F 55M? Not Asked   Social History Narrative     Not on file       Current Outpatient Medications   Medication Sig Dispense Refill     aspirin 81 MG tablet Take  by mouth daily.       COMPRESSION STOCKINGS 1 each daily Wear daily size large, two pair 2 each 0     fluticasone (FLONASE) 50 MCG/ACT spray Spray 2 sprays into both nostrils daily 1 Bottle 11     Ibuprofen (ADVIL PO) Take 800 mg by mouth every 6 hours as needed for moderate pain       VITAMIN D, CHOLECALCIFEROL, PO Take 2,000 Units by mouth daily         Medications and history reviewed    Physical exam:  Vitals: Temp 98.6  F (37  C) (Temporal)   Ht 1.753 m (5' 9\")   Wt 94.8 kg (209 lb)   LMP 03/14/2017   BMI 30.86 kg/m     BMI= Body mass index is 30.86 kg/m .    HEART: RRR, no new murmurs  LUNGS: CTAB, equal chest rise, good effort  ABD: soft, non tender, non distended  INCISIONS: c/d/i  EXT: BLACKWELL, no deformities    PATHOLOGY:  cholesterolosis    Assessment:     ICD-10-CM    1. S/P laparoscopic cholecystectomy Z90.49      Plan: Doing well. Continues to improve. Path reviewed and questions answered. She many return prn.    Rob Gonzales, DO      Again, thank you for allowing me to participate in the care of your patient.        Sincerely,        Rob Gonzales, DO    "

## 2019-06-03 ENCOUNTER — HOSPITAL ENCOUNTER (EMERGENCY)
Facility: CLINIC | Age: 54
Discharge: HOME | End: 2019-06-03
Attending: EMERGENCY MEDICINE | Admitting: EMERGENCY MEDICINE
Payer: COMMERCIAL

## 2019-06-03 ENCOUNTER — APPOINTMENT (OUTPATIENT)
Dept: GENERAL RADIOLOGY | Facility: CLINIC | Age: 54
End: 2019-06-03
Attending: EMERGENCY MEDICINE
Payer: COMMERCIAL

## 2019-06-03 ENCOUNTER — OFFICE VISIT (OUTPATIENT)
Dept: URGENT CARE | Facility: RETAIL CLINIC | Age: 54
End: 2019-06-03
Payer: COMMERCIAL

## 2019-06-03 VITALS
SYSTOLIC BLOOD PRESSURE: 112 MMHG | OXYGEN SATURATION: 100 % | TEMPERATURE: 98 F | DIASTOLIC BLOOD PRESSURE: 80 MMHG | HEART RATE: 78 BPM

## 2019-06-03 VITALS
RESPIRATION RATE: 16 BRPM | SYSTOLIC BLOOD PRESSURE: 125 MMHG | DIASTOLIC BLOOD PRESSURE: 83 MMHG | HEART RATE: 80 BPM | TEMPERATURE: 98.4 F | OXYGEN SATURATION: 99 %

## 2019-06-03 DIAGNOSIS — J20.9 ACUTE BRONCHITIS, UNSPECIFIED ORGANISM: ICD-10-CM

## 2019-06-03 DIAGNOSIS — R06.2 WHEEZING: ICD-10-CM

## 2019-06-03 DIAGNOSIS — R05.9 COUGH: Primary | ICD-10-CM

## 2019-06-03 LAB
ANION GAP SERPL CALCULATED.3IONS-SCNC: 6 MMOL/L (ref 3–14)
BASOPHILS # BLD AUTO: 0.1 10E9/L (ref 0–0.2)
BASOPHILS NFR BLD AUTO: 0.5 %
BUN SERPL-MCNC: 5 MG/DL (ref 7–30)
CALCIUM SERPL-MCNC: 8.9 MG/DL (ref 8.5–10.1)
CHLORIDE SERPL-SCNC: 107 MMOL/L (ref 94–109)
CO2 SERPL-SCNC: 27 MMOL/L (ref 20–32)
CREAT SERPL-MCNC: 0.64 MG/DL (ref 0.52–1.04)
D DIMER PPP FEU-MCNC: 0.3 UG/ML FEU (ref 0–0.5)
DIFFERENTIAL METHOD BLD: NORMAL
EOSINOPHIL NFR BLD AUTO: 2.2 %
ERYTHROCYTE [DISTWIDTH] IN BLOOD BY AUTOMATED COUNT: 13.1 % (ref 10–15)
GFR SERPL CREATININE-BSD FRML MDRD: >90 ML/MIN/{1.73_M2}
GLUCOSE SERPL-MCNC: 95 MG/DL (ref 70–99)
HCT VFR BLD AUTO: 42.9 % (ref 35–47)
HGB BLD-MCNC: 14.4 G/DL (ref 11.7–15.7)
IMM GRANULOCYTES # BLD: 0 10E9/L (ref 0–0.4)
IMM GRANULOCYTES NFR BLD: 0.3 %
LYMPHOCYTES # BLD AUTO: 2.8 10E9/L (ref 0.8–5.3)
LYMPHOCYTES NFR BLD AUTO: 29.7 %
MCH RBC QN AUTO: 31.3 PG (ref 26.5–33)
MCHC RBC AUTO-ENTMCNC: 33.6 G/DL (ref 31.5–36.5)
MCV RBC AUTO: 93 FL (ref 78–100)
MONOCYTES # BLD AUTO: 0.5 10E9/L (ref 0–1.3)
MONOCYTES NFR BLD AUTO: 5.6 %
NEUTROPHILS # BLD AUTO: 5.8 10E9/L (ref 1.6–8.3)
NEUTROPHILS NFR BLD AUTO: 61.7 %
NRBC # BLD AUTO: 0 10*3/UL
NRBC BLD AUTO-RTO: 0 /100
PLATELET # BLD AUTO: 284 10E9/L (ref 150–450)
POTASSIUM SERPL-SCNC: 3.3 MMOL/L (ref 3.4–5.3)
RBC # BLD AUTO: 4.6 10E12/L (ref 3.8–5.2)
SODIUM SERPL-SCNC: 140 MMOL/L (ref 133–144)
WBC # BLD AUTO: 9.4 10E9/L (ref 4–11)

## 2019-06-03 PROCEDURE — 85379 FIBRIN DEGRADATION QUANT: CPT | Performed by: EMERGENCY MEDICINE

## 2019-06-03 PROCEDURE — 25000125 ZZHC RX 250: Performed by: EMERGENCY MEDICINE

## 2019-06-03 PROCEDURE — 71046 X-RAY EXAM CHEST 2 VIEWS: CPT | Mod: TC

## 2019-06-03 PROCEDURE — 85025 COMPLETE CBC W/AUTO DIFF WBC: CPT | Performed by: EMERGENCY MEDICINE

## 2019-06-03 PROCEDURE — 99284 EMERGENCY DEPT VISIT MOD MDM: CPT | Mod: 25 | Performed by: EMERGENCY MEDICINE

## 2019-06-03 PROCEDURE — 80048 BASIC METABOLIC PNL TOTAL CA: CPT | Performed by: EMERGENCY MEDICINE

## 2019-06-03 PROCEDURE — 99284 EMERGENCY DEPT VISIT MOD MDM: CPT | Mod: Z6 | Performed by: EMERGENCY MEDICINE

## 2019-06-03 PROCEDURE — 94640 AIRWAY INHALATION TREATMENT: CPT

## 2019-06-03 RX ORDER — PREDNISONE 20 MG/1
TABLET ORAL
Qty: 10 TABLET | Refills: 0 | Status: SHIPPED | OUTPATIENT
Start: 2019-06-03 | End: 2019-06-10

## 2019-06-03 RX ORDER — METHYLPREDNISOLONE SODIUM SUCCINATE 125 MG/2ML
60 INJECTION, POWDER, LYOPHILIZED, FOR SOLUTION INTRAMUSCULAR; INTRAVENOUS ONCE
Status: DISCONTINUED | OUTPATIENT
Start: 2019-06-03 | End: 2019-06-03

## 2019-06-03 RX ORDER — ALBUTEROL SULFATE 90 UG/1
2 AEROSOL, METERED RESPIRATORY (INHALATION) EVERY 4 HOURS PRN
Qty: 18 G | Refills: 0 | Status: SHIPPED | OUTPATIENT
Start: 2019-06-03 | End: 2019-07-12

## 2019-06-03 RX ORDER — IPRATROPIUM BROMIDE AND ALBUTEROL SULFATE 2.5; .5 MG/3ML; MG/3ML
3 SOLUTION RESPIRATORY (INHALATION) ONCE
Status: COMPLETED | OUTPATIENT
Start: 2019-06-03 | End: 2019-06-03

## 2019-06-03 RX ADMIN — IPRATROPIUM BROMIDE AND ALBUTEROL SULFATE 3 ML: .5; 3 SOLUTION RESPIRATORY (INHALATION) at 12:46

## 2019-06-03 ASSESSMENT — ENCOUNTER SYMPTOMS
COUGH: 1
FEVER: 1
VOMITING: 1
SHORTNESS OF BREATH: 1
SINUS PAIN: 0
SINUS PRESSURE: 0
WHEEZING: 1
DIARRHEA: 0
CHILLS: 1
NAUSEA: 0

## 2019-06-03 NOTE — ED TRIAGE NOTES
Presents with cough times one week. States productive at times. She went to express care but was redirected to ER for chest xray

## 2019-06-03 NOTE — PROGRESS NOTES
SUBJECTIVE:   Velma Rizvi is a 53 year old female presenting with a chief complaint of a little sinus congestion, productive yellow congestion really thick, wheezing, SOB, since 2019 and has not taken medication for these symptoms.  Patient states she coughs so hard she will vomit.  Patient states she has no history of asthma but she did have an albuterol inhaler in the past.  Patient has a tobacco history.    Chief Complaint   Patient presents with     Cough     4 days       She is an established patient of [place].    URI Adult  Course of illness is worsening.    Severity severe  Current and Associated symptoms: fever, chills, cough - productive, wheezing, shortness of breath and vomiting  Treatment measures tried include None tried.  Predisposing factors include None.    Review of Systems   Constitutional: Positive for chills and fever.   HENT: Positive for sneezing. Negative for ear pain, sinus pressure and sinus pain.    Respiratory: Positive for cough, shortness of breath and wheezing.    Gastrointestinal: Positive for vomiting. Negative for diarrhea and nausea.       Past Medical History:   Diagnosis Date     Phlebitis and thrombophlebitis 10/12/2012     PONV (postoperative nausea and vomiting)      Family History   Problem Relation Age of Onset     Cancer Mother          - lung cancer with mets.     Respiratory Father         asthma     Cancer Maternal Grandmother         breast- 48 fatal age 51     Diabetes Maternal Uncle      Hypertension Brother      Diabetes Brother      Current Outpatient Medications   Medication Sig Dispense Refill     aspirin 81 MG tablet Take  by mouth daily.       COMPRESSION STOCKINGS 1 each daily Wear daily size large, two pair 2 each 0     fluticasone (FLONASE) 50 MCG/ACT spray Spray 2 sprays into both nostrils daily 1 Bottle 11     Ibuprofen (ADVIL PO) Take 800 mg by mouth every 6 hours as needed for moderate pain       VITAMIN D, CHOLECALCIFEROL, PO Take  2,000 Units by mouth daily       Social History     Tobacco Use     Smoking status: Current Every Day Smoker     Packs/day: 0.50     Years: 15.00     Pack years: 7.50     Smokeless tobacco: Never Used   Substance Use Topics     Alcohol use: Yes     Comment: rarely       OBJECTIVE  /80   Pulse 78   Temp 98  F (36.7  C) (Oral)   LMP 03/14/2017   SpO2 100%     Physical Exam   Constitutional: Vital signs are normal. She appears well-developed and well-nourished.   HENT:   Head: Normocephalic and atraumatic.   Right Ear: Hearing, tympanic membrane, external ear and ear canal normal.   Left Ear: Hearing, tympanic membrane, external ear and ear canal normal.   Mouth/Throat: Uvula is midline and oropharynx is clear and moist.   Eyes: Conjunctivae are normal.   Pulmonary/Chest:   Bilateral wheezing posterior more on the left than the right    Nursing note and vitals reviewed.    ASSESSMENT: Coughing and bronchospams      PLAN:  Nurse unable to get appointment at the primary clinic but not able to do so.    Sent patient to ED   ED notified

## 2019-06-03 NOTE — ED AVS SNAPSHOT
[facility] Emergency Department  [address] [place] [address]  Phone:  [phone]  Fax:  [phone]                                    Velma Rizvi   MRN: [id number]    Department:  [facility] Emergency Department   Date of Visit:  6/3/2019           After Visit Summary Signature Page    I have received my discharge instructions, and my questions have been answered. I have discussed any challenges I see with this plan with the nurse or doctor.    ..........................................................................................................................................  Patient/Patient Representative Signature      ..........................................................................................................................................  Patient Representative Print Name and Relationship to Patient    ..................................................               ................................................  Date                                   Time    ..........................................................................................................................................  Reviewed by Signature/Title    ...................................................              ..............................................  Date                                               Time          22EPIC Rev 08/18

## 2019-06-03 NOTE — ED PROVIDER NOTES
"  History     Chief Complaint   Patient presents with     Cough     The history is provided by the patient.     Velma Rizvi is a 53 year old female who presents to the emergency department for a cough. Patient reports having a cough for about 10 days. She states it came on gradual with a cough and shortness of breath and recently has gotten worse. She states at times it is productive with \"thick yellow phlegm\". She states occasionally after coughing so hard she will then vomit and feel lightheaded. She reports the cough does keep her up at night. She denies every being diagnosed with asthma, however, she states she was given a nebulizer \"years ago\". She went to [facility] today and due to her having a cough and wheezing, they sent her to the ED to have an xray done. She denies any chest pain, palpitations, new calf pain or upper leg pain. She states today is her first visit for this illness, she has not been given any antibiotics recently or had a chest xray done. She does take an allergy medication at night. She is taking a baby aspirin every day. She is a smoker for over 30 years now.    Allergies:  Allergies   Allergen Reactions     No Known Drug Allergies        Problem List:    Patient Active Problem List    Diagnosis Date Noted     Peripheral edema 09/06/2018     Priority: Medium     Obesity, Class I, BMI 30-34.9 12/02/2017     Priority: Medium     Irregular periods 08/11/2011     Priority: Medium     Varicose veins of legs 08/11/2011     Priority: Medium     See abnormal US study.       Tobacco use disorder 01/24/2008     Priority: Medium        Past Medical History:    Past Medical History:   Diagnosis Date     Phlebitis and thrombophlebitis 10/12/2012     PONV (postoperative nausea and vomiting)        Past Surgical History:    Past Surgical History:   Procedure Laterality Date     C NONSPECIFIC PROCEDURE      Right hand surgery- tendon repair     COLONOSCOPY N/A 1/8/2018    Procedure: COLONOSCOPY;  " COLONOSCOPY;  Surgeon: Paresh Curran MD;  Location: [facility]     HC TREATMENT INCOMPLETE  SURG, ANY TRIMESTER      D & C  s/p miscarriage     LAPAROSCOPIC CHOLECYSTECTOMY N/A 2019    Procedure: LAPAROSCOPIC CHOLECYSTECTOMY;  Surgeon: Rob Gonzales DO;  Location:  OR       Family History:    Family History   Problem Relation Age of Onset     Cancer Mother          - lung cancer with mets.     Respiratory Father         asthma     Cancer Maternal Grandmother         breast- 48 fatal age 51     Diabetes Maternal Uncle      Hypertension Brother      Diabetes Brother        Social History:  Marital Status:   [2]  Social History     Tobacco Use     Smoking status: Current Every Day Smoker     Packs/day: 0.50     Years: 15.00     Pack years: 7.50     Smokeless tobacco: Never Used   Substance Use Topics     Alcohol use: Yes     Comment: rarely     Drug use: No        Medications:      albuterol (PROAIR HFA) 108 (90 Base) MCG/ACT inhaler   predniSONE (DELTASONE) 20 MG tablet   aspirin 81 MG tablet   COMPRESSION STOCKINGS   fluticasone (FLONASE) 50 MCG/ACT spray   Ibuprofen (ADVIL PO)   VITAMIN D, CHOLECALCIFEROL, PO         Review of Systems   All other systems reviewed and are negative.      Physical Exam   BP: 125/83  Heart Rate: 82  Temp: 98.4  F (36.9  C)  Resp: 14  SpO2: 99 %      Physical Exam   Constitutional: She is oriented to person, place, and time. She appears well-developed and well-nourished. No distress.   HENT:   Head: Normocephalic and atraumatic.   Eyes: Pupils are equal, round, and reactive to light. No scleral icterus.   Neck: Normal range of motion. Neck supple.   Cardiovascular: Normal rate and regular rhythm.   Pulmonary/Chest: Effort normal. She has wheezes. She has no rales.   Diffuse scattered wheezes   Abdominal: Soft.   Musculoskeletal: Normal range of motion.   Neurological: She is alert and oriented to person, place, and time.   Skin: Skin is warm and  dry. No rash noted. She is not diaphoretic. No erythema. No pallor.   Psychiatric: She has a normal mood and affect. Her behavior is normal. Thought content normal.   Nursing note and vitals reviewed.      ED Course        Procedures                   Results for orders placed or performed during the hospital encounter of 06/03/19 (from the past 24 hour(s))   CBC with platelets differential   Result Value Ref Range    WBC 9.4 4.0 - 11.0 10e9/L    RBC Count 4.60 3.8 - 5.2 10e12/L    Hemoglobin 14.4 11.7 - 15.7 g/dL    Hematocrit 42.9 35.0 - 47.0 %    MCV 93 78 - 100 fl    MCH 31.3 26.5 - 33.0 pg    MCHC 33.6 31.5 - 36.5 g/dL    RDW 13.1 10.0 - 15.0 %    Platelet Count 284 150 - 450 10e9/L    Diff Method Automated Method     % Neutrophils 61.7 %    % Lymphocytes 29.7 %    % Monocytes 5.6 %    % Eosinophils 2.2 %    % Basophils 0.5 %    % Immature Granulocytes 0.3 %    Nucleated RBCs 0 0 /100    Absolute Neutrophil 5.8 1.6 - 8.3 10e9/L    Absolute Lymphocytes 2.8 0.8 - 5.3 10e9/L    Absolute Monocytes 0.5 0.0 - 1.3 10e9/L    Absolute Basophils 0.1 0.0 - 0.2 10e9/L    Abs Immature Granulocytes 0.0 0 - 0.4 10e9/L    Absolute Nucleated RBC 0.0    Basic metabolic panel   Result Value Ref Range    Sodium 140 133 - 144 mmol/L    Potassium 3.3 (L) 3.4 - 5.3 mmol/L    Chloride 107 94 - 109 mmol/L    Carbon Dioxide 27 20 - 32 mmol/L    Anion Gap 6 3 - 14 mmol/L    Glucose 95 70 - 99 mg/dL    Urea Nitrogen 5 (L) 7 - 30 mg/dL    Creatinine 0.64 0.52 - 1.04 mg/dL    GFR Estimate >90 >60 mL/min/[1.73_m2]    GFR Estimate If Black >90 >60 mL/min/[1.73_m2]    Calcium 8.9 8.5 - 10.1 mg/dL   D dimer quantitative   Result Value Ref Range    D Dimer 0.3 0.0 - 0.50 ug/ml FEU   XR Chest 2 Views    Narrative    CHEST TWO VIEWS   6/3/2019 1:56 PM     HISTORY: Cough, shortness of breath, wheezing.    COMPARISON: Chest x-rays dated 1/5/2014.    FINDINGS:  The lungs are clear. No pleural effusions or pneumothorax.  Heart size and pulmonary  vascularity are within normal limits. No  acute fracture.      Impression    IMPRESSION: No evidence of acute cardiopulmonary disease is seen.       Medications   methylPREDNISolone sodium succinate (solu-MEDROL) injection 62.5 mg (has no administration in time range)   ipratropium - albuterol 0.5 mg/2.5 mg/3 mL (DUONEB) neb solution 3 mL (3 mLs Nebulization Given 6/3/19 1246)       Assessments & Plan (with Medical Decision Making)  53-year-old female presents with bronchitis symptoms.  She is a smoker.  Chest x-ray, d-dimer and labs were all unremarkable.  The patient was given a DuoNeb breathing treatment with improvement.  I will send her home with 5 days of prednisone and albuterol inhaler.  Return to ER precautions were discussed.     I have reviewed the nursing notes.    I have reviewed the findings, diagnosis, plan and need for follow up with the patient.         Medication List      Started    albuterol 108 (90 Base) MCG/ACT inhaler  Commonly known as:  PROAIR HFA  2 puffs, Inhalation, EVERY 4 HOURS PRN     predniSONE 20 MG tablet  Commonly known as:  DELTASONE  Take two tablets (= 40mg) each day for 5 (five) days            Final diagnoses:   Acute bronchitis, unspecified organism     This document serves as a record of services personally performed by Sean Lomeli M. It was created on their behalf by Stephanie Eagle, a trained medical scribe. The creation of this record is based on the provider's personal observations and the statements of the patient. This document has been checked and approved by the attending provider.    Note: Chart documentation done in part with Dragon Voice Recognition software. Although reviewed after completion, some word and grammatical errors may remain.    6/3/2019   [facility] EMERGENCY DEPARTMENT     Vance Lomeli MD  06/03/19 1318

## 2019-06-03 NOTE — ED NOTES
Tech unable to get IV started so had lab draw labs. Pt updated on plan that we made still need to get large bore IV ir lab values merit us to get a CT of chest.

## 2019-06-03 NOTE — DISCHARGE INSTRUCTIONS
Return to the ER if you develop new or worsening symptoms.  Take prednisone and use the inhaler as directed.  Your x-ray showed no evidence for pneumonia.

## 2019-06-10 ENCOUNTER — OFFICE VISIT (OUTPATIENT)
Dept: FAMILY MEDICINE | Facility: CLINIC | Age: 54
End: 2019-06-10
Payer: COMMERCIAL

## 2019-06-10 VITALS
WEIGHT: 206 LBS | HEIGHT: 69 IN | DIASTOLIC BLOOD PRESSURE: 72 MMHG | RESPIRATION RATE: 18 BRPM | TEMPERATURE: 98.3 F | OXYGEN SATURATION: 99 % | BODY MASS INDEX: 30.51 KG/M2 | HEART RATE: 76 BPM | SYSTOLIC BLOOD PRESSURE: 128 MMHG

## 2019-06-10 DIAGNOSIS — R06.2 WHEEZING WITHOUT DIAGNOSIS OF ASTHMA: Primary | ICD-10-CM

## 2019-06-10 DIAGNOSIS — F17.200 TOBACCO USE DISORDER: ICD-10-CM

## 2019-06-10 DIAGNOSIS — J31.0 CHRONIC RHINITIS: ICD-10-CM

## 2019-06-10 PROCEDURE — 99214 OFFICE O/P EST MOD 30 MIN: CPT | Performed by: FAMILY MEDICINE

## 2019-06-10 RX ORDER — INHALER, ASSIST DEVICES
SPACER (EA) MISCELLANEOUS
Qty: 1 EACH | Refills: 3 | Status: SHIPPED | OUTPATIENT
Start: 2019-06-10 | End: 2023-11-10

## 2019-06-10 RX ORDER — FLUTICASONE PROPIONATE 50 MCG
2 SPRAY, SUSPENSION (ML) NASAL DAILY
Qty: 16 G | Refills: 11 | Status: SHIPPED | OUTPATIENT
Start: 2019-06-10 | End: 2020-01-10

## 2019-06-10 RX ORDER — PREDNISONE 20 MG/1
60 TABLET ORAL DAILY
Qty: 15 TABLET | Refills: 0 | Status: SHIPPED | OUTPATIENT
Start: 2019-06-10 | End: 2019-07-12

## 2019-06-10 ASSESSMENT — MIFFLIN-ST. JEOR: SCORE: 1613.79

## 2019-06-10 ASSESSMENT — PAIN SCALES - GENERAL: PAINLEVEL: NO PAIN (0)

## 2019-06-10 NOTE — PROGRESS NOTES
"Subjective     Velma Rizvi is a 53 year old female who presents to clinic today for the following health issues:    HPI   ED/UC Followup:    Facility:  [facility] [place]  Date of visit: 6/3/2019  Reason for visit: cough, bronchitis  Current Status: better, but dry cough, weakness, up at night, wheezing.      Here for ER follow-up for breathing.  She does not feel like things have improved much since then.  Breathing has been tight for past 2 weeks.  Has history of seasonal rhinitis that has gotten worse.  Lots of postnasal drip.    Negative x-ray in ER.  Was felt to have bronchitis.      Cough bad at night.  Feels tight.  Did not get spacer in the ER for her albuterol inhaler.  She does not feel that the inhaler has been working for her.  Got 40 mg of prednisone x5 days in ER.      Patient smokes.  Is not interested in quitting.        Reviewed and updated as needed this visit by Provider  Tobacco  Allergies  Meds  Problems  Med Hx  Surg Hx  Fam Hx         Review of Systems   ROS COMP: Constitutional, HEENT, cardiovascular, pulmonary, GI, , musculoskeletal, neuro, skin, endocrine and psych systems are negative, except as otherwise noted.      Objective    /72   Pulse 76   Temp 98.3  F (36.8  C) (Temporal)   Resp 18   Ht 1.753 m (5' 9\")   Wt 93.4 kg (206 lb)   LMP 03/14/2017   SpO2 99%   Breastfeeding? No   BMI 30.42 kg/m    Body mass index is 30.42 kg/m .  Physical Exam   Constitutional: She appears well-developed and well-nourished. No distress.   Cardiovascular: Normal rate, regular rhythm and normal heart sounds. Exam reveals no friction rub.   No murmur heard.  Pulmonary/Chest: Effort normal. No stridor. She has decreased breath sounds (problems with deep breathing.  coughed excessively with hard exhillation). She has wheezes. She has no rhonchi. She has no rales.   Neurological: She is alert.                  Assessment & Plan     ASSESSMENT/ORDERS:    ICD-10-CM    1. Wheezing without diagnosis " "of asthma R06.2 predniSONE (DELTASONE) 20 MG tablet     spacer (OPTICHAMBER JOHNNY) holding chamber     General PFT Lab (Please always keep checked)     Pulmonary Function Test   2. Chronic rhinitis J31.0 fluticasone (FLONASE) 50 MCG/ACT nasal spray   3. Tobacco use disorder F17.200 General PFT Lab (Please always keep checked)     Pulmonary Function Test     PLAN:  1.  Patient needs pulmonary function testing to help diagnosis long term breathing issue.  Appears to be asthma versus COPD.  Will give her 60 mg x5 days prednisone with spacer device for albuterol.  Will have her follow-up with me 1 week after pulmonary function testing to see how her breathing is doing.  2.  Discuss better management of her seasonal allergies.  See below for over the counter medication recommendations.   Prescription for Flonase given  3.  Tobacco cessation advised due to allergies and respiratory disease.  She is not interested in quitting at this time.  She was advised to do so soon as her breathing issues will only worsen.    Patient Instructions   1.  Saline sinus rinse (one form comes in a squirt bottle form while another kind is known as a Neti Pots).  Follow directions on package.  May do this 1-2 times per day.  2.  Flonase:  A good idea is to use this medication with saline nasal irrigation.  If you choose to do this, use the Flonase about 30-45 minutes after the sinus rinse to maximize effect of medication.    3.  Antihistamines:  Daytime:  Zyrtec (cetirizine).  Nighttime:  Benadryl (diphenhydramine).            Tobacco Cessation:   reports that she has been smoking.  She has a 7.50 pack-year smoking history. She has never used smokeless tobacco.  Tobacco Cessation Action Plan: Information offered: Patient not interested at this time      BMI:   Estimated body mass index is 30.42 kg/m  as calculated from the following:    Height as of this encounter: 1.753 m (5' 9\").    Weight as of this encounter: 93.4 kg (206 lb).   Weight " management plan: not addressed today          Return for breathing recheck 1 week after pulmonary function testing.    Gui Marques MD  [facility]

## 2019-06-10 NOTE — PATIENT INSTRUCTIONS
1.  Saline sinus rinse (one form comes in a squirt bottle form while another kind is known as a Neti Pots).  Follow directions on package.  May do this 1-2 times per day.  2.  Flonase:  A good idea is to use this medication with saline nasal irrigation.  If you choose to do this, use the Flonase about 30-45 minutes after the sinus rinse to maximize effect of medication.    3.  Antihistamines:  Daytime:  Zyrtec (cetirizine).  Nighttime:  Benadryl (diphenhydramine).

## 2019-06-18 ENCOUNTER — HOSPITAL ENCOUNTER (OUTPATIENT)
Dept: RESPIRATORY THERAPY | Facility: CLINIC | Age: 54
Discharge: HOME | End: 2019-06-18
Attending: FAMILY MEDICINE | Admitting: FAMILY MEDICINE
Payer: COMMERCIAL

## 2019-06-18 DIAGNOSIS — R06.2 WHEEZING WITHOUT DIAGNOSIS OF ASTHMA: ICD-10-CM

## 2019-06-18 DIAGNOSIS — F17.200 TOBACCO USE DISORDER: ICD-10-CM

## 2019-06-18 LAB
DLCOCOR-%PRED-PRE: 48 %
DLCOCOR-PRE: 11.92 ML/MIN/MMHG
DLCOUNC-%PRED-PRE: 49 %
DLCOUNC-PRE: 12.28 ML/MIN/MMHG
DLCOUNC-PRED: 24.69 ML/MIN/MMHG
ERV-%PRED-PRE: 64 %
ERV-PRE: 0.57 L
ERV-PRED: 0.88 L
EXPTIME-PRE: 7.21 SEC
FEF2575-%PRED-POST: 41 %
FEF2575-%PRED-PRE: 35 %
FEF2575-POST: 1.24 L/SEC
FEF2575-PRE: 1.05 L/SEC
FEF2575-PRED: 2.98 L/SEC
FEFMAX-%PRED-PRE: 54 %
FEFMAX-PRE: 4.09 L/SEC
FEFMAX-PRED: 7.5 L/SEC
FEV1-%PRED-PRE: 59 %
FEV1-PRE: 1.92 L
FEV1FEV6-PRE: 58 %
FEV1FEV6-PRED: 82 %
FEV1FVC-PRE: 58 %
FEV1FVC-PRED: 80 %
FEV1SVC-PRE: 54 %
FEV1SVC-PRED: 79 %
FIFMAX-PRE: 5.26 L/SEC
FRCPLETH-%PRED-PRE: 144 %
FRCPLETH-PRE: 4.31 L
FRCPLETH-PRED: 2.98 L
FVC-%PRED-PRE: 81 %
FVC-PRE: 3.32 L
FVC-PRED: 4.06 L
GAW-%PRED-PRE: 115 %
GAW-PRE: 1.18 L/S/CMH2O
GAW-PRED: 1.03 L/S/CMH2O
IC-%PRED-PRE: 93 %
IC-PRE: 3 L
IC-PRED: 3.21 L
RVPLETH-%PRED-PRE: 188 %
RVPLETH-PRE: 3.74 L
RVPLETH-PRED: 1.99 L
SGAW-%PRED-PRE: 274 %
SGAW-PRE: 0.28 1/CMH2O*S
SGAW-PRED: 0.1 1/CMH2O*S
SRAW-%PRED-PRE: 80 %
SRAW-PRE: 3.85 CMH2O*S
SRAW-PRED: 4.76 CMH2O*S
TLCPLETH-%PRED-PRE: 126 %
TLCPLETH-PRE: 7.31 L
TLCPLETH-PRED: 5.78 L
VA-%PRED-PRE: 75 %
VA-PRE: 4.48 L
VC-%PRED-PRE: 87 %
VC-PRE: 3.57 L
VC-PRED: 4.09 L

## 2019-06-18 PROCEDURE — 94726 PLETHYSMOGRAPHY LUNG VOLUMES: CPT

## 2019-06-18 PROCEDURE — 94726 PLETHYSMOGRAPHY LUNG VOLUMES: CPT | Mod: 26

## 2019-06-18 PROCEDURE — 94060 EVALUATION OF WHEEZING: CPT

## 2019-06-18 PROCEDURE — 94060 EVALUATION OF WHEEZING: CPT | Mod: 26

## 2019-06-18 PROCEDURE — 94729 DIFFUSING CAPACITY: CPT

## 2019-06-18 PROCEDURE — 25000125 ZZHC RX 250: Performed by: FAMILY MEDICINE

## 2019-06-18 PROCEDURE — 94729 DIFFUSING CAPACITY: CPT | Mod: 26

## 2019-06-18 RX ORDER — ALBUTEROL SULFATE 0.83 MG/ML
2.5 SOLUTION RESPIRATORY (INHALATION)
Status: COMPLETED | OUTPATIENT
Start: 2019-06-18 | End: 2019-06-18

## 2019-06-18 RX ADMIN — ALBUTEROL SULFATE 2.5 MG: 2.5 SOLUTION RESPIRATORY (INHALATION) at 08:14

## 2019-06-18 NOTE — PROGRESS NOTES
Pre-Bronch    Post-Bronch         Actual Pred %Pred  Actual %Pred %Chng       ---- SPIROMETRY ----                          FVC (L) 3.32 4.06 81   3.73 91 +12            FEV1 (L) 1.92 3.21 59   2.07 64 +7            FEV1/FVC (%) 58 80     55   -4            FEV1/SVC (%) 54 79                      FEV1/FEV6 (%) 58 82     58   -1            FEF Max (L/sec) 4.09 7.50 54   4.49 59 +9            FEF 25-75% (L/sec) 1.05 2.98 35   1.24 41 +18            FIVC (L) 3.09       3.27   +5            FIF Max (L/sec) 5.26 5.83 90   5.45 93 +3            Expiratory Time (sec) 7.21       8.13   +12            ----LUNG MECHANICS                           MVV (L/min)   107                      MEP (cmH2O)                          MIP (cmH2O)                          ---- LUNG VOLUMES ----                          SVC (L) 3.57 4.09 87                    IC (L) 3.00 3.21 93                    ERV (L) 0.57 0.88 64                    FRC(Pleth) (L) 4.31 2.98 144                    RV (Pleth) (L) 3.74 1.99 188                    TLC (Pleth) (L) 7.31 5.78 126                    RV/TLC (Pleth) (%) 51 36                      ---- DIFFUSION ----                          DLCOunc (ml/min/mmHg) 12.28 24.69 49                    DLCOcor (ml/min/mmHg) 11.92 24.69 48                    DL/VA (ml/min/mmHg/L) 2.66 4.15                      VA (L) 4.48 5.95 75                    IVC (L) 3.37                        Hgb (gm/dL) 14.4 12-18                      ---- BLOOD GASES ----                          FIO2 (%)                          pH   7.40                      PaCO2 (mmHg)   38-42                      PaO2 (mmHg)   87.1                      P(A-a)O2 (mmHg)                          SaO2 (%)                          HCO3 (mEq/L)                          COHb (%)   < 1.5%                      MetHgb (%)   < 1.5%

## 2019-07-12 ENCOUNTER — OFFICE VISIT (OUTPATIENT)
Dept: FAMILY MEDICINE | Facility: CLINIC | Age: 54
End: 2019-07-12
Payer: COMMERCIAL

## 2019-07-12 VITALS
SYSTOLIC BLOOD PRESSURE: 120 MMHG | DIASTOLIC BLOOD PRESSURE: 78 MMHG | WEIGHT: 205 LBS | HEART RATE: 99 BPM | OXYGEN SATURATION: 100 % | TEMPERATURE: 97.9 F | BODY MASS INDEX: 30.27 KG/M2 | RESPIRATION RATE: 18 BRPM

## 2019-07-12 DIAGNOSIS — Z12.39 BREAST CANCER SCREENING: ICD-10-CM

## 2019-07-12 DIAGNOSIS — J44.9 CHRONIC OBSTRUCTIVE PULMONARY DISEASE, UNSPECIFIED COPD TYPE (H): Primary | ICD-10-CM

## 2019-07-12 DIAGNOSIS — F17.200 TOBACCO USE DISORDER: ICD-10-CM

## 2019-07-12 PROCEDURE — 99214 OFFICE O/P EST MOD 30 MIN: CPT | Performed by: FAMILY MEDICINE

## 2019-07-12 RX ORDER — ALBUTEROL SULFATE 90 UG/1
2 AEROSOL, METERED RESPIRATORY (INHALATION) EVERY 4 HOURS PRN
Qty: 18 G | Refills: 3 | Status: SHIPPED | OUTPATIENT
Start: 2019-07-12 | End: 2020-08-05

## 2019-07-12 ASSESSMENT — PAIN SCALES - GENERAL: PAINLEVEL: NO PAIN (0)

## 2019-07-12 NOTE — PROGRESS NOTES
Subjective     Velma Rizvi is a 53 year old female who presents to clinic today for the following health issues:    HPI   COPD results    Would like shingrix            Here today to review her pulmonary function testing results.  They are suggestive a obstructive process.  Improvement noted with albuterol inhaler.     She had improvement with treatment given to her at her last office visit with me.      She would like to know if there are other medications that can be effective to help with her shortness of breath and dyspnea on exertion relief.      Reviewed and updated as needed this visit by Provider  Tobacco  Allergies  Meds  Problems  Med Hx  Surg Hx  Fam Hx         Review of Systems   ROS COMP: Constitutional, HEENT, cardiovascular, pulmonary, GI, , musculoskeletal, neuro, skin, endocrine and psych systems are negative, except as otherwise noted.      Objective    /78   Pulse 99   Temp 97.9  F (36.6  C) (Temporal)   Resp 18   Wt 93 kg (205 lb)   LMP 03/14/2017   SpO2 100%   BMI 30.27 kg/m    Body mass index is 30.27 kg/m .  Physical Exam   Constitutional: She appears well-developed and well-nourished.   Cardiovascular: Normal rate, regular rhythm, S1 normal, S2 normal and normal heart sounds.   No murmur heard.  Pulmonary/Chest: Effort normal and breath sounds normal. No respiratory distress. She has no wheezes. She has no rhonchi. She has no rales.   Neurological: She is alert.                  Assessment & Plan     ASSESSMENT/ORDERS:    ICD-10-CM    1. Chronic obstructive pulmonary disease, unspecified COPD type (H) J44.9 umeclidinium (INCRUSE ELLIPTA) 62.5 MCG/INH inhaler     albuterol (PROAIR HFA) 108 (90 Base) MCG/ACT inhaler   2. Tobacco use disorder F17.200    3. Breast cancer screening Z12.31 *MA Screening Digital Bilateral     PLAN:  1.  Will start her on Incruse Ellipta.  Refilled albuterol      Tobacco Cessation:   reports that she has been smoking.  She has a 7.50  "pack-year smoking history. She has never used smokeless tobacco.  Tobacco Cessation Action Plan: Information offered: Patient not interested at this time      BMI:   Estimated body mass index is 30.27 kg/m  as calculated from the following:    Height as of 6/10/19: 1.753 m (5' 9\").    Weight as of this encounter: 93 kg (205 lb).               Return in about 4 weeks (around 8/9/2019) for medication recheck.    I spent >25 minutes of face to face time with the patient, >50% of which was spent counseling and coordination of care regarding discussion of pulmonary function testing results and treatment of COPD with new medication noted above.     Gui Marques MD  [facility]        "

## 2019-07-16 ENCOUNTER — OFFICE VISIT (OUTPATIENT)
Dept: URGENT CARE | Facility: RETAIL CLINIC | Age: 54
End: 2019-07-16
Payer: COMMERCIAL

## 2019-07-16 VITALS
TEMPERATURE: 98.1 F | OXYGEN SATURATION: 98 % | HEART RATE: 78 BPM | SYSTOLIC BLOOD PRESSURE: 123 MMHG | DIASTOLIC BLOOD PRESSURE: 84 MMHG

## 2019-07-16 DIAGNOSIS — R30.0 DYSURIA: Primary | ICD-10-CM

## 2019-07-16 LAB
BILIRUB UR QL: ABNORMAL
CLARITY: CLEAR
COLOR UR: YELLOW
GLUCOSE URINE: ABNORMAL MG/DL
HGB UR QL: ABNORMAL
KETONES UR QL: ABNORMAL MG/DL
NITRITE UR QL STRIP: ABNORMAL
PH UR STRIP: 7 PH (ref 5–7)
PROT UR QL: ABNORMAL MG/DL
SP GR UR STRIP: 1.01 (ref 1–1.03)
SPECIMEN VOL UR: ABNORMAL ML
UROBILINOGEN UR QL STRIP: 0.2 EU/DL (ref 0.2–1)
WBC #/AREA URNS HPF: ABNORMAL /[HPF]

## 2019-07-16 PROCEDURE — 81003 URINALYSIS AUTO W/O SCOPE: CPT | Performed by: NURSE PRACTITIONER

## 2019-07-16 PROCEDURE — 87086 URINE CULTURE/COLONY COUNT: CPT | Performed by: NURSE PRACTITIONER

## 2019-07-16 PROCEDURE — 99213 OFFICE O/P EST LOW 20 MIN: CPT | Performed by: NURSE PRACTITIONER

## 2019-07-16 ASSESSMENT — ENCOUNTER SYMPTOMS
NAUSEA: 0
FLANK PAIN: 0
VOMITING: 0
CHILLS: 0
ABDOMINAL PAIN: 0
DIAPHORESIS: 0
FEVER: 0
FATIGUE: 0
ACTIVITY CHANGE: 0
LIGHT-HEADEDNESS: 0
ABDOMINAL DISTENTION: 0
DIFFICULTY URINATING: 0
MYALGIAS: 0
WEAKNESS: 0
DYSURIA: 0
FREQUENCY: 0
HEMATURIA: 0
APPETITE CHANGE: 0
SLEEP DISTURBANCE: 0

## 2019-07-16 NOTE — PATIENT INSTRUCTIONS
Discussed borderline UA with trace leukocytes.  Recommend starting over the counter monistat topical for vaginal burning and irritation.  Urine culture pending.  Please follow up with primary care provider if still symptomatic - may need wet prep to check for BV or candida (yeast infection).  Probiotic for vaginal mildred.  May take epsom salt baths for relief, discussed no douching.  Drink plenty of fluids. Limit caffeine and alcohol as these are bladder irritants.  May take tylenol or ibuprofen as needed for discomfort.   If you develop any vomiting, high fevers or lower back pain, these can be signs of a kidney infection and you should be seen in urgent care or in the ER.  Prevention of future infections by drinking cranberry juice, urination after intercourse and wiping from front to back after using the toilet.  Please follow up with primary care provider if symptoms return, if you're not improving, worsening or new symptoms or for any adverse reactions to medications.

## 2019-07-16 NOTE — PROGRESS NOTES
Chief Complaint   Patient presents with     Urinary Problem     x 4 days      SUBJECTIVE:  Velma Rizvi is a 53 year old female who  presents today for a possible UTI.   She has symptoms of vaginal burning, swelling, and pain with sex that have been going on for 4 day(s).    Symptom timing described as gradual onset and moderate in severity.  This patient does have a history of urinary tract infections and this is not similar to that presentation.  There is no history of hematuria, flank pain, fever, chills, nausea or vomiting.   Patient denies vaginal discharge and vaginal odor.    Past Medical History:   Diagnosis Date     Phlebitis and thrombophlebitis 10/12/2012     PONV (postoperative nausea and vomiting)      Current Outpatient Medications   Medication Sig Dispense Refill     albuterol (PROAIR HFA) 108 (90 Base) MCG/ACT inhaler Inhale 2 puffs into the lungs every 4 hours as needed for shortness of breath / dyspnea 18 g 3     aspirin 81 MG tablet Take  by mouth daily.       COMPRESSION STOCKINGS 1 each daily Wear daily size large, two pair 2 each 0     fluticasone (FLONASE) 50 MCG/ACT nasal spray Spray 2 sprays into both nostrils daily 16 g 11     Ibuprofen (ADVIL PO) Take 800 mg by mouth every 6 hours as needed for moderate pain       spacer (OPTICHAMBER JOHNNY) holding chamber Use as needed with albuterol inhaler. 1 each 3     umeclidinium (INCRUSE ELLIPTA) 62.5 MCG/INH inhaler Inhale 1 puff into the lungs daily 1 Inhaler 1     VITAMIN D, CHOLECALCIFEROL, PO Take 2,000 Units by mouth daily       Social History     Tobacco Use     Smoking status: Current Every Day Smoker     Packs/day: 0.50     Years: 15.00     Pack years: 7.50     Smokeless tobacco: Never Used   Substance Use Topics     Alcohol use: Yes     Comment: rarely     Allergies   Allergen Reactions     No Known Drug Allergies      Review of Systems   Constitutional: Negative for activity change, appetite change, chills, diaphoresis, fatigue and  fever.   Gastrointestinal: Negative for abdominal distention, abdominal pain, nausea and vomiting.   Genitourinary: Positive for dyspareunia and vaginal pain (burning). Negative for difficulty urinating, dysuria, flank pain, frequency, hematuria, menstrual problem, urgency, vaginal bleeding and vaginal discharge.   Musculoskeletal: Negative for myalgias.   Neurological: Negative for weakness and light-headedness.   Psychiatric/Behavioral: Negative for sleep disturbance.     OBJECTIVE:  /84   Pulse 78   Temp 98.1  F (36.7  C) (Oral)   LMP 03/14/2017   SpO2 98%      Physical Exam   Constitutional: She is oriented to person, place, and time. She appears well-developed and well-nourished. No distress.   HENT:   Head: Normocephalic and atraumatic.   Eyes: Pupils are equal, round, and reactive to light. EOM are normal.   Cardiovascular: Normal rate and intact distal pulses.   Pulmonary/Chest: Effort normal. No respiratory distress.   Abdominal: Soft. She exhibits no distension. There is no tenderness. There is no guarding.   Genitourinary: No vaginal discharge found.   Musculoskeletal: Normal range of motion.   Neurological: She is alert and oriented to person, place, and time.   Skin: Skin is warm and dry. She is not diaphoretic.   Psychiatric: She has a normal mood and affect. Her behavior is normal.   Vitals reviewed.    Results for orders placed or performed in visit on 07/16/19   UA without Microscopic   Result Value Ref Range    Glucose Urine Neg neg mg/dL    Bilirubin Urine Neg neg    Ketones Urine Neg neg mg/dL    Specific Gravity Urine 1.010 1.003 - 1.035    pH Urine 7.0 5.0 - 7.0 pH    Protein Urine neg neg - neg mg/dL    Urobilinogen Urine 0.2 0.2 - 1.0 EU/dL    Nitrite Urine Neg NEG    Blood Urine Neg neg    Leukocytes trace     Color Urine Yellow     Clarity clear     Volume       ASSESSMENT:    ICD-10-CM    1. Dysuria R30.0 Urine Culture Aerobic Bacterial     UA without Microscopic     PLAN:    Patient Instructions   Discussed borderline UA with trace leukocytes.  Recommend starting over the counter monistat topical for vaginal burning and irritation.  Urine culture pending.  Please follow up with primary care provider if still symptomatic - may need wet prep to check for BV or candida (yeast infection).  Probiotic for vaginal mildred.  May take epsom salt baths for relief, discussed no douching.  Drink plenty of fluids. Limit caffeine and alcohol as these are bladder irritants.  May take tylenol or ibuprofen as needed for discomfort.   If you develop any vomiting, high fevers or lower back pain, these can be signs of a kidney infection and you should be seen in urgent care or in the ER.  Prevention of future infections by drinking cranberry juice, urination after intercourse and wiping from front to back after using the toilet.  Please follow up with primary care provider if symptoms return, if you're not improving, worsening or new symptoms or for any adverse reactions to medications.        Follow up with primary care provider with any problems, questions or concerns or if symptoms worsen or fail to improve. Patient agreed to plan and verbalized understanding.    Sherley Vasquez, ROMY-BC  [facility] [place]

## 2019-07-17 LAB
BACTERIA SPEC CULT: NO GROWTH
Lab: NORMAL
SPECIMEN SOURCE: NORMAL

## 2019-07-19 ENCOUNTER — ALLIED HEALTH/NURSE VISIT (OUTPATIENT)
Dept: FAMILY MEDICINE | Facility: OTHER | Age: 54
End: 2019-07-19
Payer: COMMERCIAL

## 2019-07-19 DIAGNOSIS — Z23 NEED FOR VACCINATION: Primary | ICD-10-CM

## 2019-07-19 PROCEDURE — 90471 IMMUNIZATION ADMIN: CPT

## 2019-07-19 PROCEDURE — 90750 HZV VACC RECOMBINANT IM: CPT

## 2019-07-19 NOTE — PROGRESS NOTES
Screening Questionnaire for Adult Immunization    Are you sick today?   No   Do you have allergies to medications, food, a vaccine component or latex?   No   Have you ever had a serious reaction after receiving a vaccination?   No   Do you have a long-term health problem with heart disease, lung disease, asthma, kidney disease, metabolic disease (e.g. diabetes), anemia, or other blood disorder?   No   Do you have cancer, leukemia, HIV/AIDS, or any other immune system problem?   No   In the past 3 months, have you taken medications that affect  your immune system, such as prednisone, other steroids, or anticancer drugs; drugs for the treatment of rheumatoid arthritis, Crohn s disease, or psoriasis; or have you had radiation treatments?   No   Have you had a seizure, or a brain or other nervous system problem?   No   During the past year, have you received a transfusion of blood or blood     products, or been given immune (gamma) globulin or antiviral drug?   No   For women: Are you pregnant or is there a chance you could become        pregnant during the next month?   No   Have you received any vaccinations in the past 4 weeks?   No     Immunization questionnaire answers were all negative.        Per orders of Gui Harris , injection of 2nd Shingrix given by Dolly Pickett. Patient instructed to remain in clinic for 15 minutes afterwards, and to report any adverse reaction to me immediately.       Screening performed by Dolly Pickett on 7/19/2019 at 12:41 PM.      Got okay from Leonid pollard to do vaccine earlier then appointment. He stated he would be available if needed.     Dolly Pickett MA

## 2019-08-23 ENCOUNTER — OFFICE VISIT (OUTPATIENT)
Dept: FAMILY MEDICINE | Facility: CLINIC | Age: 54
End: 2019-08-23
Payer: COMMERCIAL

## 2019-08-23 ENCOUNTER — HOSPITAL ENCOUNTER (OUTPATIENT)
Dept: MAMMOGRAPHY | Facility: CLINIC | Age: 54
Discharge: HOME | End: 2019-08-23
Attending: FAMILY MEDICINE | Admitting: FAMILY MEDICINE
Payer: COMMERCIAL

## 2019-08-23 VITALS
DIASTOLIC BLOOD PRESSURE: 80 MMHG | OXYGEN SATURATION: 98 % | SYSTOLIC BLOOD PRESSURE: 126 MMHG | HEART RATE: 90 BPM | TEMPERATURE: 97.7 F | BODY MASS INDEX: 30.48 KG/M2 | WEIGHT: 205.8 LBS | RESPIRATION RATE: 16 BRPM | HEIGHT: 69 IN

## 2019-08-23 DIAGNOSIS — Z13.220 LIPID SCREENING: ICD-10-CM

## 2019-08-23 DIAGNOSIS — Z12.31 VISIT FOR SCREENING MAMMOGRAM: ICD-10-CM

## 2019-08-23 DIAGNOSIS — J44.9 COPD, MODERATE (H): Primary | ICD-10-CM

## 2019-08-23 DIAGNOSIS — F17.200 TOBACCO USE DISORDER: ICD-10-CM

## 2019-08-23 PROCEDURE — 99214 OFFICE O/P EST MOD 30 MIN: CPT | Performed by: FAMILY MEDICINE

## 2019-08-23 PROCEDURE — 77063 BREAST TOMOSYNTHESIS BI: CPT

## 2019-08-23 RX ORDER — VARENICLINE TARTRATE 1 MG/1
1 TABLET, FILM COATED ORAL 2 TIMES DAILY
Qty: 56 TABLET | Refills: 4 | Status: SHIPPED | OUTPATIENT
Start: 2019-09-20 | End: 2021-03-12

## 2019-08-23 ASSESSMENT — MIFFLIN-ST. JEOR: SCORE: 1612.88

## 2019-08-23 ASSESSMENT — PAIN SCALES - GENERAL: PAINLEVEL: NO PAIN (0)

## 2019-08-23 NOTE — PROGRESS NOTES
Subjective     Velma Rizvi is a 53 year old female who presents to clinic today for the following health issues:    HPI   COPD Follow-Up    Overall, how are your COPD symptoms since your last clinic visit?  No change    How much fatigue or shortness of breath do you have when you are walking?  None    How much shortness of breath do you have when you are resting?  None    How often do you cough? Often usually every morning    Have you noticed any change in your sputum/phlegm?  No    Have you experienced a recent fever? No    Please describe how far you can walk without stopping to rest:  1-2 miles    How many flights of stairs are you able to walk up without stopping?  3 or more    Have you had any Emergency Room Visits, Urgent Care Visits, or Hospital Admissions because of your COPD since your last office visit?  No    History   Smoking Status     Current Every Day Smoker     Packs/day: 0.50     Years: 15.00   Smokeless Tobacco     Never Used     No results found for: FEV1, XFN5ATB      How many servings of fruits and vegetables do you eat daily?  0-1    On average, how many sweetened beverages do you drink each day (soda, juice, sweet tea, etc)?   2    How many days per week do you miss taking your medication? 0              Patient here for follow-up on her COPD management.  Diagnosed with moderate COPD and was started in Incruse Ellipta last month.  She has stopped the medication as she does not notice any difference on or off the medication.  She feels that her breathing is fine with no shortness of breath or dyspnea on exertion.  However, she was started on medication due to her objective findings on pulmonary function testing as well as her clinical findings of wheezing and decreased airflow on auscultation.    She has not tried albuterol inhaler with or without the Incruse Ellipta to see if breathing improvement would be found.      She is interested in smoking cessation.  She has been on Chantix before  "and has been able to quit smoking, but has since gone back to smoking.  She did not have any noted side effects on the Chantix.        Reviewed and updated as needed this visit by Provider  Tobacco  Allergies  Meds  Problems  Med Hx  Surg Hx  Fam Hx         Review of Systems   ROS COMP: Constitutional, HEENT, cardiovascular, pulmonary, GI, , musculoskeletal, neuro, skin, endocrine and psych systems are negative, except as otherwise noted.      Objective    /80 (BP Location: Right arm, Patient Position: Chair, Cuff Size: Adult Regular)   Pulse 90   Temp 97.7  F (36.5  C) (Temporal)   Resp 16   Ht 1.753 m (5' 9\")   Wt 93.4 kg (205 lb 12.8 oz)   LMP 03/14/2017   SpO2 98%   BMI 30.39 kg/m    Body mass index is 30.39 kg/m .  Physical Exam   Constitutional: She appears well-developed and well-nourished.   Cardiovascular: Normal rate, regular rhythm, S1 normal, S2 normal and normal heart sounds.   No murmur heard.  Pulmonary/Chest: Effort normal. No respiratory distress. She has decreased breath sounds (tight breathing). She has wheezes. She has no rhonchi. She has no rales.   Neurological: She is alert.   Psychiatric: She has a normal mood and affect. Her speech is normal and behavior is normal. Judgment normal. Cognition and memory are normal.                  Assessment & Plan     ASSESSMENT/ORDERS:    ICD-10-CM    1. COPD, moderate (H) J44.9 COPD ACTION PLAN     umeclidinium-vilanterol (ANORO ELLIPTA) 62.5-25 MCG/INH oral inhaler   2. Tobacco use disorder F17.200 varenicline (CHANTIX STARTING MONTH [place]) 0.5 MG X 11 & 1 MG X 42 tablet     varenicline (CHANTIX CONTINUING MONTH [place]) 1 MG tablet   3. Lipid screening Z13.220 Lipid panel reflex to direct LDL Fasting     PLAN:  1.  Will change Incruse Ellipta to Anoro Ellipta and see if she finds benefit in her breathing symptoms to this.  If not, may consider Trelegy Ellipta, as adding inhaled corticosteroid may be needed.  2.  Discussed Chantix along " "with risks/benefits.  she was started on this today.  Recommended if she is able to quit smoking within 3 months, she should continue medication for a full 6 months.        Tobacco Cessation:   reports that she has been smoking.  She has a 7.50 pack-year smoking history. She has never used smokeless tobacco.  Tobacco Cessation Action Plan: see above      BMI:   Estimated body mass index is 30.39 kg/m  as calculated from the following:    Height as of this encounter: 1.753 m (5' 9\").    Weight as of this encounter: 93.4 kg (205 lb 12.8 oz).               Return in about 4 weeks (around 9/20/2019) for medication recheck for COPD.    Gui Marques MD  [facility]    "

## 2019-08-29 NOTE — RESULT ENCOUNTER NOTE
Velma,  Your results are normal.  Please let me know if you have any questions.    Sincerely,  Dr. Marques

## 2019-09-28 ENCOUNTER — HEALTH MAINTENANCE LETTER (OUTPATIENT)
Age: 54
End: 2019-09-28

## 2019-10-28 DIAGNOSIS — J44.9 COPD, MODERATE (H): ICD-10-CM

## 2019-10-28 NOTE — TELEPHONE ENCOUNTER
"heriberto bliss  Last Written Prescription Date:  08/23/2019  Last Fill Quantity: 1,  # refills: 1   Last office visit: 8/23/2019 with prescribing provider:     Future Office Visit:   Next 5 appointments (look out 90 days)    Nov 29, 2019  2:15 PM CST  PHYSICAL with Gui Marques MD  [facility] ([facility] [address] [place] [id number] [phone]         Requested Prescriptions   Pending Prescriptions Disp Refills     umeclidinium-vilanterol (ANORO ELLIPTA) 62.5-25 MCG/INH oral inhaler 1 Inhaler 1     Sig: Inhale 1 puff into the lungs daily       Asthma Maintenance Inhalers - Anticholinergics Passed - 10/28/2019 12:31 PM        Passed - Patient is age 12 years or older        Passed - Recent (12 mo) or future (30 days) visit within the authorizing provider's specialty     Patient has had an office visit with the authorizing provider or a provider within the authorizing providers department within the previous 12 mos or has a future within next 30 days. See \"Patient Info\" tab in inbasket, or \"Choose Columns\" in Meds & Orders section of the refill encounter.              Passed - Medication is active on med list          "

## 2020-01-10 ENCOUNTER — OFFICE VISIT (OUTPATIENT)
Dept: FAMILY MEDICINE | Facility: CLINIC | Age: 55
End: 2020-01-10
Payer: COMMERCIAL

## 2020-01-10 VITALS
RESPIRATION RATE: 16 BRPM | OXYGEN SATURATION: 98 % | DIASTOLIC BLOOD PRESSURE: 70 MMHG | WEIGHT: 208.8 LBS | SYSTOLIC BLOOD PRESSURE: 126 MMHG | TEMPERATURE: 97.6 F | HEIGHT: 69 IN | HEART RATE: 89 BPM | BODY MASS INDEX: 30.93 KG/M2

## 2020-01-10 DIAGNOSIS — F17.200 TOBACCO USE DISORDER: ICD-10-CM

## 2020-01-10 DIAGNOSIS — Z00.00 ROUTINE GENERAL MEDICAL EXAMINATION AT A HEALTH CARE FACILITY: Primary | ICD-10-CM

## 2020-01-10 DIAGNOSIS — J31.0 CHRONIC RHINITIS: ICD-10-CM

## 2020-01-10 DIAGNOSIS — Z13.220 LIPID SCREENING: ICD-10-CM

## 2020-01-10 DIAGNOSIS — Z11.4 ENCOUNTER FOR SCREENING FOR HUMAN IMMUNODEFICIENCY VIRUS (HIV): ICD-10-CM

## 2020-01-10 DIAGNOSIS — Z23 ENCOUNTER FOR IMMUNIZATION: ICD-10-CM

## 2020-01-10 DIAGNOSIS — J44.9 COPD, MODERATE (H): ICD-10-CM

## 2020-01-10 LAB
CHOLEST SERPL-MCNC: 192 MG/DL
HDLC SERPL-MCNC: 75 MG/DL
LDLC SERPL CALC-MCNC: 92 MG/DL
NONHDLC SERPL-MCNC: 117 MG/DL
TRIGL SERPL-MCNC: 127 MG/DL

## 2020-01-10 PROCEDURE — 90471 IMMUNIZATION ADMIN: CPT | Performed by: FAMILY MEDICINE

## 2020-01-10 PROCEDURE — 99396 PREV VISIT EST AGE 40-64: CPT | Mod: 25 | Performed by: FAMILY MEDICINE

## 2020-01-10 PROCEDURE — 80061 LIPID PANEL: CPT | Performed by: FAMILY MEDICINE

## 2020-01-10 PROCEDURE — 90732 PPSV23 VACC 2 YRS+ SUBQ/IM: CPT | Performed by: FAMILY MEDICINE

## 2020-01-10 PROCEDURE — 36415 COLL VENOUS BLD VENIPUNCTURE: CPT | Performed by: FAMILY MEDICINE

## 2020-01-10 PROCEDURE — 90472 IMMUNIZATION ADMIN EACH ADD: CPT | Performed by: FAMILY MEDICINE

## 2020-01-10 PROCEDURE — 87389 HIV-1 AG W/HIV-1&-2 AB AG IA: CPT | Performed by: FAMILY MEDICINE

## 2020-01-10 PROCEDURE — 90682 RIV4 VACC RECOMBINANT DNA IM: CPT | Performed by: FAMILY MEDICINE

## 2020-01-10 RX ORDER — FLUTICASONE PROPIONATE 50 MCG
2 SPRAY, SUSPENSION (ML) NASAL DAILY
Qty: 48 G | Refills: 4 | Status: SHIPPED | OUTPATIENT
Start: 2020-01-10 | End: 2021-03-12

## 2020-01-10 ASSESSMENT — ENCOUNTER SYMPTOMS
SHORTNESS OF BREATH: 1
ABDOMINAL PAIN: 0
CHILLS: 0
NAUSEA: 0
WEAKNESS: 0
ARTHRALGIAS: 0
PARESTHESIAS: 0
SORE THROAT: 0
NERVOUS/ANXIOUS: 0
JOINT SWELLING: 0
HEADACHES: 1
MYALGIAS: 0
BREAST MASS: 0
DIZZINESS: 0
DYSURIA: 0
PALPITATIONS: 0
FREQUENCY: 0
COUGH: 0
DIARRHEA: 0
HEARTBURN: 1
HEMATURIA: 0
FEVER: 0
CONSTIPATION: 0
EYE PAIN: 0
HEMATOCHEZIA: 0

## 2020-01-10 ASSESSMENT — PAIN SCALES - GENERAL: PAINLEVEL: NO PAIN (0)

## 2020-01-10 ASSESSMENT — MIFFLIN-ST. JEOR: SCORE: 1621.49

## 2020-01-10 NOTE — PROGRESS NOTES
SUBJECTIVE:   CC: Velma Rizvi is an 54 year old woman who presents for preventive health visit.     Healthy Habits:     Getting at least 3 servings of Calcium per day:  Yes    Bi-annual eye exam:  Yes    Dental care twice a year:  Yes    Sleep apnea or symptoms of sleep apnea:  None    Diet:  Other    Frequency of exercise:  None    Taking medications regularly:  Yes    Medication side effects:  Other    PHQ-2 Total Score: 0    Additional concerns today:  No              Today's PHQ-2 Score:   PHQ-2 ( 1999 [employer]) 1/10/2020   Q1: Little interest or pleasure in doing things 0   Q2: Feeling down, depressed or hopeless 0   PHQ-2 Score 0   Q1: Little interest or pleasure in doing things Not at all   Q2: Feeling down, depressed or hopeless Not at all   PHQ-2 Score 0       Abuse: Current or Past(Physical, Sexual or Emotional)- No  Do you feel safe in your environment? Yes        Social History     Tobacco Use     Smoking status: Current Every Day Smoker     Packs/day: 0.50     Years: 15.00     Pack years: 7.50     Smokeless tobacco: Never Used   Substance Use Topics     Alcohol use: Yes     Comment: rarely         Alcohol Use 1/10/2020   Prescreen: >3 drinks/day or >7 drinks/week? No   Prescreen: >3 drinks/day or >7 drinks/week? -       Reviewed orders with patient.  Reviewed health maintenance and updated orders accordingly - Yes      Mammogram Screening: Patient over age 50, mutual decision to screen reflected in health maintenance.    Pertinent mammograms are reviewed under the imaging tab.  History of abnormal Pap smear: NO - age 30-65 PAP every 5 years with negative HPV co-testing recommended  PAP / HPV Latest Ref Rng & Units 11/29/2017 10/3/2012 3/17/2009   PAP - NIL NIL NIL   HPV 16 DNA NEG:Negative Negative - -   HPV 18 DNA NEG:Negative Negative - -   OTHER HR HPV NEG:Negative Negative - -     Reviewed and updated as needed this visit by clinical staff  Tobacco  Allergies  Meds  Problems  Med Hx  Surg  "Hx  Fam Hx  Soc Hx          Reviewed and updated as needed this visit by Provider  Tobacco  Allergies  Meds  Problems  Med Hx  Surg Hx  Fam Hx            Review of Systems   Constitutional: Negative for chills and fever.   HENT: Positive for hearing loss. Negative for congestion, ear pain and sore throat.    Eyes: Negative for pain and visual disturbance.   Respiratory: Positive for shortness of breath. Negative for cough.    Cardiovascular: Positive for peripheral edema. Negative for chest pain and palpitations.   Gastrointestinal: Positive for heartburn. Negative for abdominal pain, constipation, diarrhea, hematochezia and nausea.   Breasts:  Negative for tenderness, breast mass and discharge.   Genitourinary: Negative for dysuria, frequency, genital sores, hematuria, pelvic pain, urgency, vaginal bleeding and vaginal discharge.   Musculoskeletal: Negative for arthralgias, joint swelling and myalgias.   Skin: Negative for rash.   Neurological: Positive for headaches. Negative for dizziness, weakness and paresthesias.   Psychiatric/Behavioral: Negative for mood changes. The patient is not nervous/anxious.           OBJECTIVE:   /70   Pulse 89   Temp 97.6  F (36.4  C) (Temporal)   Resp 16   Ht 1.753 m (5' 9\")   Wt 94.7 kg (208 lb 12.8 oz)   LMP 03/14/2017   SpO2 98%   Breastfeeding No   BMI 30.83 kg/m    Physical Exam  Constitutional:       General: She is not in acute distress.     Appearance: Normal appearance. She is well-developed. She is obese.   HENT:      Right Ear: Hearing, tympanic membrane, ear canal and external ear normal.      Left Ear: Hearing, tympanic membrane, ear canal and external ear normal.      Nose: Nose normal.      Mouth/Throat:      Pharynx: Uvula midline. No oropharyngeal exudate or posterior oropharyngeal erythema.   Eyes:      General: Lids are normal.         Right eye: No discharge.         Left eye: No discharge.      Conjunctiva/sclera: Conjunctivae normal.    "   Pupils: Pupils are equal, round, and reactive to light.   Neck:      Musculoskeletal: Normal range of motion and neck supple.      Thyroid: No thyromegaly.      Trachea: No tracheal deviation.   Cardiovascular:      Rate and Rhythm: Normal rate and regular rhythm.      Pulses: Normal pulses.      Heart sounds: Normal heart sounds, S1 normal and S2 normal. No murmur. No friction rub. No S3 or S4 sounds.    Pulmonary:      Effort: Pulmonary effort is normal. No respiratory distress.      Breath sounds: Normal breath sounds. No wheezing or rales.   Abdominal:      General: Bowel sounds are normal.      Palpations: Abdomen is soft. There is no mass.      Tenderness: There is no abdominal tenderness.   Musculoskeletal: Normal range of motion.   Lymphadenopathy:      Cervical: No cervical adenopathy.      Upper Body:      Right upper body: No supraclavicular adenopathy.      Left upper body: No supraclavicular adenopathy.   Skin:     General: Skin is warm and dry.      Findings: No rash.   Neurological:      Mental Status: She is alert and oriented to person, place, and time.      Cranial Nerves: No cranial nerve deficit.      Sensory: No sensory deficit.      Motor: No abnormal muscle tone.      Deep Tendon Reflexes: Reflexes are normal and symmetric.   Psychiatric:         Thought Content: Thought content normal.         Judgment: Judgment normal.               ASSESSMENT/PLAN:       ICD-10-CM    1. Routine general medical examination at a health care facility Z00.00    2. Tobacco use disorder F17.200 varenicline (CHANTIX STARTING MONTH [place]) 0.5 MG X 11 & 1 MG X 42 tablet   3. COPD, moderate (H) J44.9 umeclidinium-vilanterol (ANORO ELLIPTA) 62.5-25 MCG/INH oral inhaler   4. Chronic rhinitis J31.0 fluticasone (FLONASE) 50 MCG/ACT nasal spray   5. Lipid screening Z13.220 Lipid panel reflex to direct LDL Fasting   6. Encounter for screening for human immunodeficiency virus (HIV) Z11.4 HIV Antigen Antibody Combo   7.  "Encounter for immunization Z23 FLU VAC, QUADRIVALENT (RIV4) RECOMBINANT DNA, IM     Pneumococcal vaccine 23 valent PPSV23  (Pneumovax) [34526]     ADMIN MEDICARE: Pneumococcal Vaccine ()     ADMIN: Vaccine, Initial (89688)       COUNSELING:  Reviewed preventive health counseling, as reflected in patient instructions    Estimated body mass index is 30.83 kg/m  as calculated from the following:    Height as of this encounter: 1.753 m (5' 9\").    Weight as of this encounter: 94.7 kg (208 lb 12.8 oz).    Weight management plan: Discussed healthy diet and exercise guidelines     reports that she has been smoking. She has a 7.50 pack-year smoking history. She has never used smokeless tobacco.  Tobacco Cessation Action Plan: Pharmacotherapies : Chantix    Counseling Resources:  ATP IV Guidelines  Pooled Cohorts Equation Calculator  Breast Cancer Risk Calculator  FRAX Risk Assessment  ICSI Preventive Guidelines  Dietary Guidelines for Americans, 2010  USDA's MyPlate  ASA Prophylaxis  Lung CA Screening    Gui Marques MD  [facility]  "

## 2020-01-13 LAB — HIV 1+2 AB+HIV1 P24 AG SERPL QL IA: NONREACTIVE

## 2020-01-23 ENCOUNTER — TELEPHONE (OUTPATIENT)
Dept: FAMILY MEDICINE | Facility: CLINIC | Age: 55
End: 2020-01-23

## 2020-01-23 DIAGNOSIS — R60.0 PERIPHERAL EDEMA: Primary | ICD-10-CM

## 2020-01-23 NOTE — TELEPHONE ENCOUNTER
Reason for Call: Request for an order or referral:    Order or referral being requested: support stockings the most supportive she can get    Date needed: as soon as possible    Has the patient been seen by the PCP for this problem? YES    Additional comments: patient is calling needs an order to get more support stockings     Phone number Patient can be reached at:  Cell number on file:    Telephone Information:   Mobile [phone]       Best Time:  any    Can we leave a detailed message on this number?  YES    Call taken on 1/23/2020 at 11:14 AM by Josiane Fontanez

## 2020-01-24 NOTE — TELEPHONE ENCOUNTER
Patient was notified of this message. She will try the website.     Cecilia Vaughan CMA ([facility])

## 2020-08-04 DIAGNOSIS — J44.9 CHRONIC OBSTRUCTIVE PULMONARY DISEASE, UNSPECIFIED COPD TYPE (H): ICD-10-CM

## 2020-08-05 RX ORDER — ALBUTEROL SULFATE 90 UG/1
2 AEROSOL, METERED RESPIRATORY (INHALATION) EVERY 4 HOURS PRN
Qty: 18 G | Refills: 6 | Status: SHIPPED | OUTPATIENT
Start: 2020-08-05 | End: 2021-03-12

## 2020-08-05 NOTE — TELEPHONE ENCOUNTER
ALBUTERAL HFA  Inhaler Prescription approved per [facility] Refill Protocol...........WALT Guillen

## 2021-01-10 ENCOUNTER — HEALTH MAINTENANCE LETTER (OUTPATIENT)
Age: 56
End: 2021-01-10

## 2021-02-04 DIAGNOSIS — J44.9 COPD, MODERATE (H): ICD-10-CM

## 2021-02-05 NOTE — TELEPHONE ENCOUNTER
"Medication is being filled for 1 time refill only due to:  Patient needs to be seen because it has been more than one year since last visit.    Sending to scheduling for yearly office visit due    Requested Prescriptions   Pending Prescriptions Disp Refills     umeclidinium-vilanterol (ANORO ELLIPTA) 62.5-25 MCG/INH oral inhaler       Sig: Inhale 1 puff into the lungs daily   Last Written Prescription Date: 1/10/2020   Last Fill Quantity: 3 inhalers ,  # refills: 4   Last office visit: 1/10/2020 with prescribing provider:     Future Office Visit:        Asthma Maintenance Inhalers - Anticholinergics Failed - 2/4/2021 11:30 AM        Failed - Recent (12 mo) or future (30 days) visit within the authorizing provider's specialty     Patient has had an office visit with the authorizing provider or a provider within the authorizing providers department within the previous 12 mos or has a future within next 30 days. See \"Patient Info\" tab in inbasket, or \"Choose Columns\" in Meds & Orders section of the refill encounter.            Passed - Patient is age 12 years or older        Passed - Medication is active on med list       Inhaled Steroids Protocol Failed - 2/4/2021 11:30 AM        Failed - Recent (12 mo) or future (30 days) visit within the authorizing provider's specialty     Patient has had an office visit with the authorizing provider or a provider within the authorizing providers department within the previous 12 mos or has a future within next 30 days. See \"Patient Info\" tab in inbasket, or \"Choose Columns\" in Meds & Orders section of the refill encounter.              Passed - Patient is age 12 or older        Passed - Medication is active on med list           "

## 2021-02-08 NOTE — TELEPHONE ENCOUNTER
Made appt.................Lewis Huff LPN,   February 8, 2021,      4:41 PM,   [facility]

## 2021-03-05 ASSESSMENT — ENCOUNTER SYMPTOMS
DYSURIA: 0
HEADACHES: 1
FREQUENCY: 0
SHORTNESS OF BREATH: 1
PARESTHESIAS: 0
DIARRHEA: 0
COUGH: 0
NAUSEA: 0
BREAST MASS: 0
ABDOMINAL PAIN: 0
HEMATOCHEZIA: 0
WEAKNESS: 0
HEMATURIA: 0
CONSTIPATION: 0
ARTHRALGIAS: 1
HEARTBURN: 1
DIZZINESS: 0
FEVER: 0
SORE THROAT: 0
MYALGIAS: 0
EYE PAIN: 0
CHILLS: 0
PALPITATIONS: 0
NERVOUS/ANXIOUS: 0
JOINT SWELLING: 0

## 2021-03-12 ENCOUNTER — OFFICE VISIT (OUTPATIENT)
Dept: FAMILY MEDICINE | Facility: CLINIC | Age: 56
End: 2021-03-12
Payer: COMMERCIAL

## 2021-03-12 VITALS
BODY MASS INDEX: 34.23 KG/M2 | DIASTOLIC BLOOD PRESSURE: 74 MMHG | OXYGEN SATURATION: 100 % | HEART RATE: 91 BPM | TEMPERATURE: 98.2 F | RESPIRATION RATE: 18 BRPM | SYSTOLIC BLOOD PRESSURE: 128 MMHG | WEIGHT: 213 LBS | HEIGHT: 66 IN

## 2021-03-12 DIAGNOSIS — Z00.00 ROUTINE GENERAL MEDICAL EXAMINATION AT A HEALTH CARE FACILITY: Primary | ICD-10-CM

## 2021-03-12 DIAGNOSIS — E66.9 OBESITY (BMI 30-39.9): ICD-10-CM

## 2021-03-12 DIAGNOSIS — Z11.59 NEED FOR HEPATITIS C SCREENING TEST: ICD-10-CM

## 2021-03-12 DIAGNOSIS — J44.9 COPD, MODERATE (H): ICD-10-CM

## 2021-03-12 DIAGNOSIS — F17.200 TOBACCO USE DISORDER: ICD-10-CM

## 2021-03-12 DIAGNOSIS — Z12.31 ENCOUNTER FOR SCREENING MAMMOGRAM FOR BREAST CANCER: ICD-10-CM

## 2021-03-12 LAB
GLUCOSE SERPL-MCNC: 95 MG/DL (ref 70–99)
HCV AB SERPL QL IA: NONREACTIVE

## 2021-03-12 PROCEDURE — 36415 COLL VENOUS BLD VENIPUNCTURE: CPT | Performed by: FAMILY MEDICINE

## 2021-03-12 PROCEDURE — 82947 ASSAY GLUCOSE BLOOD QUANT: CPT | Performed by: FAMILY MEDICINE

## 2021-03-12 PROCEDURE — 86803 HEPATITIS C AB TEST: CPT | Performed by: FAMILY MEDICINE

## 2021-03-12 PROCEDURE — 99396 PREV VISIT EST AGE 40-64: CPT | Performed by: FAMILY MEDICINE

## 2021-03-12 RX ORDER — VARENICLINE TARTRATE 1 MG/1
1 TABLET, FILM COATED ORAL 2 TIMES DAILY
Qty: 56 TABLET | Refills: 4 | Status: SHIPPED | OUTPATIENT
Start: 2021-03-12 | End: 2022-07-18

## 2021-03-12 RX ORDER — ALBUTEROL SULFATE 90 UG/1
2 AEROSOL, METERED RESPIRATORY (INHALATION) EVERY 4 HOURS PRN
Qty: 18 G | Refills: 6 | Status: SHIPPED | OUTPATIENT
Start: 2021-03-12 | End: 2022-07-18

## 2021-03-12 ASSESSMENT — ENCOUNTER SYMPTOMS
HEARTBURN: 1
MYALGIAS: 0
HEADACHES: 1
HEMATOCHEZIA: 0
PALPITATIONS: 0
CONSTIPATION: 0
FEVER: 0
HEMATURIA: 0
DIZZINESS: 0
SHORTNESS OF BREATH: 1
CHILLS: 0
JOINT SWELLING: 0
BREAST MASS: 0
NERVOUS/ANXIOUS: 0
WEAKNESS: 0
EYE PAIN: 0
DYSURIA: 0
SORE THROAT: 0
ABDOMINAL PAIN: 0
NAUSEA: 0
PARESTHESIAS: 0
FREQUENCY: 0
ARTHRALGIAS: 1
COUGH: 0
DIARRHEA: 0

## 2021-03-12 ASSESSMENT — PAIN SCALES - GENERAL: PAINLEVEL: NO PAIN (0)

## 2021-03-12 ASSESSMENT — MIFFLIN-ST. JEOR: SCORE: 1587.91

## 2021-03-12 NOTE — PROGRESS NOTES
SUBJECTIVE:   CC: Velma Rizvi is an 55 year old woman who presents for preventive health visit.       Patient has been advised of split billing requirements and indicates understanding: Yes  Healthy Habits:     Getting at least 3 servings of Calcium per day:  Yes    Bi-annual eye exam:  Yes    Dental care twice a year:  Yes    Sleep apnea or symptoms of sleep apnea:  None    Diet:  Regular (no restrictions)    Frequency of exercise:  None    Duration of exercise:  N/A    Taking medications regularly:  Yes    Barriers to taking medications:  None    Medication side effects:  None    PHQ-2 Total Score: 0    Additional concerns today:  No    Patient wishes to go on Chantix to stop smoking.            Today's PHQ-2 Score:   PHQ-2 ( 1999 [employer]) 3/5/2021   Q1: Little interest or pleasure in doing things 0   Q2: Feeling down, depressed or hopeless 0   PHQ-2 Score 0   Q1: Little interest or pleasure in doing things Not at all   Q2: Feeling down, depressed or hopeless Not at all   PHQ-2 Score 0       Abuse: Current or Past (Physical, Sexual or Emotional) - No  Do you feel safe in your environment? Yes    Have you ever done Advance Care Planning? (For example, a Health Directive, POLST, or a discussion with a medical provider or your loved ones about your wishes): No, advance care planning information given to patient to review.  Advanced care planning was discussed at today's visit.    Social History     Tobacco Use     Smoking status: Current Every Day Smoker     Packs/day: 0.75     Years: 36.00     Pack years: 27.00     Smokeless tobacco: Never Used   Substance Use Topics     Alcohol use: Yes     Comment: rarely         Alcohol Use 3/5/2021   Prescreen: >3 drinks/day or >7 drinks/week? No   Prescreen: >3 drinks/day or >7 drinks/week? -       Any new diagnosis of family breast, ovarian, or bowel cancer? No     Reviewed orders with patient.  Reviewed health maintenance and updated orders accordingly - Yes  Lab work  "is in process    Breast CA Risk Screening:  No flowsheet data found.      Mammogram Screening: Recommended annual mammography  Pertinent mammograms are reviewed under the imaging tab.    History of abnormal Pap smear: NO - age 30-65 PAP every 5 years with negative HPV co-testing recommended  PAP / HPV Latest Ref Rng & Units 11/29/2017 10/3/2012 3/17/2009   PAP - NIL NIL NIL   HPV 16 DNA NEG:Negative Negative - -   HPV 18 DNA NEG:Negative Negative - -   OTHER HR HPV NEG:Negative Negative - -     Reviewed and updated as needed this visit by clinical staff  Tobacco  Allergies  Meds  Problems  Med Hx  Surg Hx  Fam Hx  Soc Hx          Reviewed and updated as needed this visit by Provider  Tobacco  Allergies  Meds  Problems  Med Hx  Surg Hx  Fam Hx             Review of Systems   Constitutional: Negative for chills and fever.   HENT: Positive for hearing loss. Negative for congestion, ear pain and sore throat.    Eyes: Negative for pain and visual disturbance.   Respiratory: Positive for shortness of breath. Negative for cough.    Cardiovascular: Positive for peripheral edema. Negative for chest pain and palpitations.   Gastrointestinal: Positive for heartburn. Negative for abdominal pain, constipation, diarrhea, hematochezia and nausea.   Breasts:  Negative for tenderness, breast mass and discharge.   Genitourinary: Negative for dysuria, frequency, genital sores, hematuria, pelvic pain, urgency, vaginal bleeding and vaginal discharge.   Musculoskeletal: Positive for arthralgias. Negative for joint swelling and myalgias.   Skin: Negative for rash.   Neurological: Positive for headaches. Negative for dizziness, weakness and paresthesias.   Psychiatric/Behavioral: Negative for mood changes. The patient is not nervous/anxious.           OBJECTIVE:   /74   Pulse 91   Temp 98.2  F (36.8  C) (Temporal)   Resp 18   Ht 1.676 m (5' 6\")   Wt 96.6 kg (213 lb)   LMP 03/14/2017   SpO2 100%   Breastfeeding " No   BMI 34.38 kg/m    Physical Exam  Constitutional:       General: She is not in acute distress.     Appearance: Normal appearance. She is well-developed. She is obese.   HENT:      Right Ear: Hearing, tympanic membrane, ear canal and external ear normal.      Left Ear: Hearing, tympanic membrane, ear canal and external ear normal.      Nose: Nose normal.      Mouth/Throat:      Pharynx: Uvula midline. No oropharyngeal exudate or posterior oropharyngeal erythema.   Eyes:      General: Lids are normal.         Right eye: No discharge.         Left eye: No discharge.      Conjunctiva/sclera: Conjunctivae normal.      Pupils: Pupils are equal, round, and reactive to light.   Neck:      Musculoskeletal: Normal range of motion and neck supple.      Thyroid: No thyromegaly.      Trachea: No tracheal deviation.   Cardiovascular:      Rate and Rhythm: Normal rate and regular rhythm.      Pulses: Normal pulses.      Heart sounds: Normal heart sounds, S1 normal and S2 normal. No murmur. No friction rub. No S3 or S4 sounds.    Pulmonary:      Effort: Pulmonary effort is normal. No respiratory distress.      Breath sounds: Normal breath sounds. No wheezing or rales.   Abdominal:      General: Bowel sounds are normal.      Palpations: Abdomen is soft. There is no mass.      Tenderness: There is no abdominal tenderness.   Musculoskeletal: Normal range of motion.   Lymphadenopathy:      Cervical: No cervical adenopathy.      Upper Body:      Right upper body: No supraclavicular adenopathy.      Left upper body: No supraclavicular adenopathy.   Skin:     General: Skin is warm and dry.      Findings: No rash.   Neurological:      Mental Status: She is alert and oriented to person, place, and time.      Cranial Nerves: No cranial nerve deficit.      Sensory: No sensory deficit.      Motor: No abnormal muscle tone.      Deep Tendon Reflexes: Reflexes are normal and symmetric.   Psychiatric:         Thought Content: Thought content  "normal.         Judgment: Judgment normal.               ASSESSMENT/PLAN:     ASSESSMENT/ORDERS:    ICD-10-CM    1. Routine general medical examination at a health care facility  Z00.00    2. Tobacco use disorder  F17.200 varenicline (CHANTIX STARTING MONTH [place]) 0.5 MG X 11 & 1 MG X 42 tablet     varenicline (CHANTIX CONTINUING MONTH [place]) 1 MG tablet     SMOKING CESSATION COUNSELING, 3-10 MIN   3. COPD, moderate (H)  J44.9 umeclidinium-vilanterol (ANORO ELLIPTA) 62.5-25 MCG/INH oral inhaler     albuterol (PROAIR HFA) 108 (90 Base) MCG/ACT inhaler   4. Obesity (BMI 30-39.9)  E66.9 Glucose   5. Need for hepatitis C screening test  Z11.59 Hepatitis C Screen Reflex to HCV RNA Quant and Genotype   6. Encounter for screening mammogram for breast cancer  Z12.31 *MA Screening Digital Bilateral     PLAN:  1.   Medications refilled for all other above noted stable conditions.  Labs ordered as noted above.   2.  Varicose veins stable and asymptomatic.  Continue with compression stockings.    5 min spent today discussing smoking cessation and prescription for Chantix.    Patient has been advised of split billing requirements and indicates understanding: Yes  COUNSELING:  Reviewed preventive health counseling, as reflected in patient instructions    Estimated body mass index is 34.38 kg/m  as calculated from the following:    Height as of this encounter: 1.676 m (5' 6\").    Weight as of this encounter: 96.6 kg (213 lb).    Weight management plan: Discussed healthy diet and exercise guidelines    She reports that she has been smoking. She has a 27.00 pack-year smoking history. She has never used smokeless tobacco.  Tobacco Cessation Action Plan:   see above      Counseling Resources:  ATP IV Guidelines  Pooled Cohorts Equation Calculator  Breast Cancer Risk Calculator  BRCA-Related Cancer Risk Assessment: FHS-7 Tool  FRAX Risk Assessment  ICSI Preventive Guidelines  Dietary Guidelines for Americans, 2010  USDA's MyPlate  ASA " Prophylaxis  Lung CA Screening    Gui Marques MD  [facility]

## 2021-03-12 NOTE — PATIENT INSTRUCTIONS
Preventive Health Recommendations  Female Ages 50 - 64    Yearly exam: See your health care provider every year in order to  o Review health changes.   o Discuss preventive care.    o Review your medicines if your doctor has prescribed any.      Get a Pap test every three years (unless you have an abnormal result and your provider advises testing more often).    If you get Pap tests with HPV test, you only need to test every 5 years, unless you have an abnormal result.     You do not need a Pap test if your uterus was removed (hysterectomy) and you have not had cancer.    You should be tested each year for STDs (sexually transmitted diseases) if you're at risk.     Have a mammogram every 1 to 2 years.    Have a colonoscopy at age 50, or have a yearly FIT test (stool test). These exams screen for colon cancer.      Have a cholesterol test every 5 years, or more often if advised.    Have a diabetes test (fasting glucose) every three years. If you are at risk for diabetes, you should have this test more often.     If you are at risk for osteoporosis (brittle bone disease), think about having a bone density scan (DEXA).    Shots: Get a flu shot each year. Get a tetanus shot every 10 years.    Nutrition:     Eat at least 5 servings of fruits and vegetables each day.    Eat whole-grain bread, whole-wheat pasta and brown rice instead of white grains and rice.    Get adequate Calcium and Vitamin D.     Lifestyle    Exercise at least 150 minutes a week (30 minutes a day, 5 days a week). This will help you control your weight and prevent disease.    Limit alcohol to one drink per day.    No smoking.     Wear sunscreen to prevent skin cancer.     See your dentist every six months for an exam and cleaning.    See your eye doctor every 1 to 2 years.     [employer] vaccine information:  Visit [email or web address].org/[email or web address] for current information on COVID-19 including vaccine related information.  Can also call  [phone] for appointment information.    [place] Department of Health vaccine information:  https://[place].gov[place]covid19[place]vaccine  https://[place].gov[place]covid19/vaccine/connector/index.jsp    Frequently asked questions about COVID-19 and the vaccine:    Center for Disease Control and Infection (CDC):  www.cdc.gov/vaccines/covid-19/index.html  American Academy of Family Physicians (AAFP):  https://familydoctor.org/covid-19-vaccine     Psyllium fiber (metamucil) once to twice daily.  Needs to be the powdered version that you add to liquid NOT the tablets

## 2021-03-19 ENCOUNTER — HOSPITAL ENCOUNTER (OUTPATIENT)
Dept: MAMMOGRAPHY | Facility: CLINIC | Age: 56
Discharge: HOME | End: 2021-03-19
Attending: FAMILY MEDICINE | Admitting: FAMILY MEDICINE
Payer: COMMERCIAL

## 2021-03-19 DIAGNOSIS — Z12.31 VISIT FOR SCREENING MAMMOGRAM: ICD-10-CM

## 2021-03-19 DIAGNOSIS — Z12.31 ENCOUNTER FOR SCREENING MAMMOGRAM FOR BREAST CANCER: ICD-10-CM

## 2021-03-19 PROCEDURE — 77063 BREAST TOMOSYNTHESIS BI: CPT

## 2021-09-13 DIAGNOSIS — J31.0 CHRONIC RHINITIS: ICD-10-CM

## 2021-09-13 RX ORDER — FLUTICASONE PROPIONATE 50 MCG
2 SPRAY, SUSPENSION (ML) NASAL DAILY
Qty: 48 G | Refills: 4 | Status: CANCELLED | OUTPATIENT
Start: 2021-09-13

## 2021-09-13 RX ORDER — FLUTICASONE PROPIONATE 50 MCG
2 SPRAY, SUSPENSION (ML) NASAL DAILY
Qty: 16 G | Refills: 11 | Status: SHIPPED | OUTPATIENT
Start: 2021-09-13 | End: 2022-07-18

## 2021-09-13 NOTE — TELEPHONE ENCOUNTER
Patient requesting refill on Fluticasone Nasal Spray, not listed on active meds in chart.    Thank you

## 2021-10-23 ENCOUNTER — HEALTH MAINTENANCE LETTER (OUTPATIENT)
Age: 56
End: 2021-10-23

## 2022-04-06 DIAGNOSIS — J44.9 COPD, MODERATE (H): ICD-10-CM

## 2022-04-08 NOTE — TELEPHONE ENCOUNTER
Pending Prescriptions:                       Disp   Refills    umeclidinium-vilanterol (ANORO ELLIPTA) 6*30 each2            Sig: Inhale 1 puff into the lungs daily **APPT DUE**    Medication is being filled for 1 time stephany refill only due to:  Patient is due for annual    Please call and help schedule.  Thank you!

## 2022-04-11 ENCOUNTER — HOSPITAL ENCOUNTER (OUTPATIENT)
Dept: MAMMOGRAPHY | Facility: CLINIC | Age: 57
Discharge: HOME | End: 2022-04-11
Attending: FAMILY MEDICINE | Admitting: FAMILY MEDICINE
Payer: COMMERCIAL

## 2022-04-11 DIAGNOSIS — Z12.31 SCREENING MAMMOGRAM FOR HIGH-RISK PATIENT: ICD-10-CM

## 2022-04-11 PROCEDURE — 77067 SCR MAMMO BI INCL CAD: CPT

## 2022-05-18 ENCOUNTER — VIRTUAL VISIT (OUTPATIENT)
Dept: URGENT CARE | Facility: CLINIC | Age: 57
End: 2022-05-18
Payer: COMMERCIAL

## 2022-05-18 DIAGNOSIS — U07.1 INFECTION DUE TO 2019 NOVEL CORONAVIRUS: Primary | ICD-10-CM

## 2022-05-18 PROCEDURE — 99214 OFFICE O/P EST MOD 30 MIN: CPT | Mod: TEL | Performed by: EMERGENCY MEDICINE

## 2022-05-18 NOTE — PROGRESS NOTES
"Phone appt    The patient has been notified of following:     \"This telephone visit will be conducted via a call between you and your physician/provider. We have found that certain health care needs can be provided without the need for a physical exam.  This service lets us provide the care you need with a short phone conversation.  If a prescription is necessary we can send it directly to your pharmacy.  If lab work is needed we can place an order for that and you can then stop by our lab to have the test done at a later time.    Telephone visits are billed at different rates depending on your insurance coverage. During this emergency period, for some insurers they may be billed the same as an in-person visit.  Please reach out to your insurance provider with any questions.    If during the course of the call the physician/provider feels a telephone visit is not appropriate, you will not be charged for this service.\"    Patient has given verbal consent for Telephone visit?  Yes    What phone number would you like to be contacted at? [phone]    How would you like to obtain your AVS? MyChart    Subjective   CC: Velma Rizvi  is a 56 year old female who presents via phone visit today for the following health issues:   Chief Complaint   Patient presents with     Infection        COVID-19 Symptom Review  How many days ago did these symptoms start? 4    Are any of the following symptoms significant for you?    New or worsening difficulty breathing? No    Worsening cough? Yes, it's a dry cough.     Fever or chills? No    Headache: no    Sore throat: no    Chest pain: YES    Diarrhea: YES-     Body aches? no    What treatments has patient tried?    Does patient live in a nursing home, group home, or shelter? no  Does patient have a way to get food/medications during quarantined? Yes, I have a friend or family member who can help me. and Yes                       Reviewed and updated as needed this visit by " Provider                    Review of Systems         Objective    Gen: Patient is alert, oriented  Gen: NAD, Non-dyspneic              Assessment/Plan:  56-year-old female, COVID-positive, with 4-day of symptoms which included some slight chest tightness, vomiting yesterday which is now resolved.  And slight diarrhea.  She is also had a slight cough.  No shortness of breath.  Patient does have a history of COPD.  She has not vaccinated against COVID.  She has normal renal function with a GFR greater than 90.  Her BMI is 34.  She will be treated with Paxlovid.    Phone call duration:  10 minutes    Rob Pickett MD

## 2022-06-04 ENCOUNTER — HEALTH MAINTENANCE LETTER (OUTPATIENT)
Age: 57
End: 2022-06-04

## 2022-07-18 ENCOUNTER — OFFICE VISIT (OUTPATIENT)
Dept: FAMILY MEDICINE | Facility: CLINIC | Age: 57
End: 2022-07-18
Payer: COMMERCIAL

## 2022-07-18 VITALS
WEIGHT: 206 LBS | OXYGEN SATURATION: 97 % | TEMPERATURE: 97.4 F | HEIGHT: 67 IN | BODY MASS INDEX: 32.33 KG/M2 | SYSTOLIC BLOOD PRESSURE: 124 MMHG | HEART RATE: 102 BPM | DIASTOLIC BLOOD PRESSURE: 76 MMHG

## 2022-07-18 DIAGNOSIS — J44.9 COPD, MODERATE (H): ICD-10-CM

## 2022-07-18 DIAGNOSIS — R60.0 PERIPHERAL EDEMA: ICD-10-CM

## 2022-07-18 DIAGNOSIS — F17.200 TOBACCO USE DISORDER: ICD-10-CM

## 2022-07-18 DIAGNOSIS — E66.811 OBESITY, CLASS I, BMI 30-34.9: ICD-10-CM

## 2022-07-18 DIAGNOSIS — I83.93 ASYMPTOMATIC VARICOSE VEINS OF BOTH LOWER EXTREMITIES: ICD-10-CM

## 2022-07-18 DIAGNOSIS — Z00.00 ROUTINE GENERAL MEDICAL EXAMINATION AT A HEALTH CARE FACILITY: Primary | ICD-10-CM

## 2022-07-18 DIAGNOSIS — Z12.4 CERVICAL CANCER SCREENING: ICD-10-CM

## 2022-07-18 DIAGNOSIS — Z87.891 PERSONAL HISTORY OF TOBACCO USE: ICD-10-CM

## 2022-07-18 DIAGNOSIS — J31.0 CHRONIC RHINITIS: ICD-10-CM

## 2022-07-18 PROCEDURE — G0296 VISIT TO DETERM LDCT ELIG: HCPCS | Performed by: FAMILY MEDICINE

## 2022-07-18 PROCEDURE — 87624 HPV HI-RISK TYP POOLED RSLT: CPT | Performed by: FAMILY MEDICINE

## 2022-07-18 PROCEDURE — G0145 SCR C/V CYTO,THINLAYER,RESCR: HCPCS | Performed by: FAMILY MEDICINE

## 2022-07-18 PROCEDURE — 99396 PREV VISIT EST AGE 40-64: CPT | Performed by: FAMILY MEDICINE

## 2022-07-18 RX ORDER — ALBUTEROL SULFATE 90 UG/1
2 AEROSOL, METERED RESPIRATORY (INHALATION) EVERY 4 HOURS PRN
Qty: 18 G | Refills: 6 | Status: SHIPPED | OUTPATIENT
Start: 2022-07-18 | End: 2023-08-30

## 2022-07-18 RX ORDER — FLUTICASONE PROPIONATE 50 MCG
2 SPRAY, SUSPENSION (ML) NASAL DAILY
Qty: 16 G | Refills: 11 | Status: SHIPPED | OUTPATIENT
Start: 2022-07-18 | End: 2023-11-10

## 2022-07-18 ASSESSMENT — ENCOUNTER SYMPTOMS
JOINT SWELLING: 0
HEARTBURN: 1
PALPITATIONS: 0
SORE THROAT: 0
BREAST MASS: 0
HEMATOCHEZIA: 0
MYALGIAS: 0
FEVER: 0
DIZZINESS: 0
DYSURIA: 0
HEADACHES: 1
SHORTNESS OF BREATH: 1
EYE PAIN: 0
FREQUENCY: 0
DIARRHEA: 0
NAUSEA: 0
ABDOMINAL PAIN: 0
CHILLS: 0
COUGH: 0
PARESTHESIAS: 0
CONSTIPATION: 0
HEMATURIA: 0
NERVOUS/ANXIOUS: 0
ARTHRALGIAS: 0
WEAKNESS: 0

## 2022-07-18 ASSESSMENT — PAIN SCALES - GENERAL: PAINLEVEL: NO PAIN (0)

## 2022-07-18 NOTE — PATIENT INSTRUCTIONS
Preventive Health Recommendations  Female Ages 50 - 64    Yearly exam: See your health care provider every year in order to  o Review health changes.   o Discuss preventive care.    o Review your medicines if your doctor has prescribed any.      Get a Pap test every three years (unless you have an abnormal result and your provider advises testing more often).    If you get Pap tests with HPV test, you only need to test every 5 years, unless you have an abnormal result.     You do not need a Pap test if your uterus was removed (hysterectomy) and you have not had cancer.    You should be tested each year for STDs (sexually transmitted diseases) if you're at risk.     Have a mammogram every 1 to 2 years.    Have a colonoscopy at age 50, or have a yearly FIT test (stool test). These exams screen for colon cancer.      Have a cholesterol test every 5 years, or more often if advised.    Have a diabetes test (fasting glucose) every three years. If you are at risk for diabetes, you should have this test more often.     If you are at risk for osteoporosis (brittle bone disease), think about having a bone density scan (DEXA).    Shots: Get a flu shot each year. Get a tetanus shot every 10 years.    Nutrition:     Eat at least 5 servings of fruits and vegetables each day.    Eat whole-grain bread, whole-wheat pasta and brown rice instead of white grains and rice.    Get adequate Calcium and Vitamin D.     Lifestyle    Exercise at least 150 minutes a week (30 minutes a day, 5 days a week). This will help you control your weight and prevent disease.    Limit alcohol to one drink per day.    No smoking.     Wear sunscreen to prevent skin cancer.     See your dentist every six months for an exam and cleaning.    See your eye doctor every 1 to 2 years.    Lung Cancer Screening   Frequently Asked Questions  If you are at high-risk for lung cancer, getting screened with low-dose computed tomography (LDCT) every year can help save  your life. This handout offers answers to some of the most common questions about lung cancer screening. If you have other questions, please call [phone] ([phone]).     What is it?  Lung cancer screening uses special X-ray technology to create an image of your lung tissue. The exam is quick and easy and takes less than 10 seconds. We don t give you any medicine or use any needles. You can eat before and after the exam. You don t need to change your clothes as long as the clothing on your chest doesn t contain metal. But, you do need to be able to hold your breath for at least 6 seconds during the exam.    What is the goal of lung cancer screening?  The goal of lung cancer screening is to save lives. Many times, lung cancer is not found until a person starts having physical symptoms. Lung cancer screening can help detect lung cancer in the earliest stages when it may be easier to treat.    Who should be screened for lung cancer?  We suggest lung cancer screening for anyone who is at high-risk for lung cancer. You are in the high-risk group if you:      are between the ages of 55 and 79, and    have smoked at least 1 pack of cigarettes a day for 20 or more years, and    still smoke or have quit within the past 15 years.    However, if you have a new cough or shortness of breath, you should talk to your doctor before being screened.    Why does it matter if I have symptoms?  Certain symptoms can be a sign that you have a condition in your lungs that should be checked and treated by your doctor. These symptoms include fever, chest pain, a new or changing cough, shortness of breath that you have never felt before, coughing up blood or unexplained weight loss. Having any of these symptoms can greatly affect the results of lung cancer screening.       Should all smokers get an LDCT lung cancer screening exam?  It depends. Lung cancer screening is for a very specific group of men and women who have a  history of heavy smoking over a long period of time (see  Who should be screened for lung cancer  above).  I am in the high-risk group, but have been diagnosed with cancer in the past. Is LDCT lung cancer screening right for me?  In some cases, you should not have LDCT lung screening, such as when your doctor is already following your cancer with CT scan studies. Your doctor will help you decide if LDCT lung screening is right for you.  Do I need to have a screening exam every year?  Yes. If you are in the high-risk group described earlier, you should get an LDCT lung cancer screening exam every year until you are 79, or are no longer willing or able to undergo screening and possible procedures to diagnose and treat lung cancer.  How effective is LDCT at preventing death from lung cancer?  Studies have shown that LDCT lung cancer screening can lower the risk of death from lung cancer by 20 percent in people who are at high-risk.  What are the risks?  There are some risks and limitations of LDCT lung cancer screening. We want to make sure you understand the risks and benefits, so please let us know if you have any questions. Your doctor may want to talk with you more about these risks.    Radiation exposure: As with any exam that uses radiation, there is a very small increased risk of cancer. The amount of radiation in LDCT is small--about the same amount a person would get from a mammogram. Your doctor orders the exam when he or she feels the potential benefits outweigh the risks.    False negatives: No test is perfect, including LDCT. It is possible that you may have a medical condition, including lung cancer, that is not found during your exam. This is called a false negative result.    False positives and more testing: LDCT very often finds something in the lung that could be cancer, but in fact is not. This is called a false positive result. False positive tests often cause anxiety. To make sure these findings  are not cancer, you may need to have more tests. These tests will be done only if you give us permission. Sometimes patients need a treatment that can have side effects, such as a biopsy. For more information on false positives, see  What can I expect from the results?     Findings not related to lung cancer: Your LDCT exam also takes pictures of areas of your body next to your lungs. In a very small number of cases, the CT scan will show an abnormal finding in one of these areas, such as your kidneys, adrenal glands, liver or thyroid. This finding may not be serious, but you may need more tests. Your doctor can help you decide what other tests you may need, if any.  What can I expect from the results?  About 1 out of 4 LDCT exams will find something that may need more tests. Most of the time, these findings are lung nodules. Lung nodules are very small collections of tissue in the lung. These nodules are very common, and the vast majority--more than 97 percent--are not cancer (benign). Most are normal lymph nodes or small areas of scarring from past infections.  But, if a small lung nodule is found to be cancer, the cancer can be cured more than 90 percent of the time. To know if the nodule is cancer, we may need to get more images before your next yearly screening exam. If the nodule has suspicious features (for example, it is large, has an odd shape or grows over time), we will refer you to a specialist for further testing.  Will my doctor also get the results?  Yes. Your doctor will get a copy of your results.  Is it okay to keep smoking now that there s a cancer screening exam?  No. Tobacco is one of the strongest cancer-causing agents. It causes not only lung cancer, but other cancers and cardiovascular (heart) diseases as well. The damage caused by smoking builds over time. This means that the longer you smoke, the higher your risk of disease. While it is never too late to quit, the sooner you quit, the  better.  Where can I find help to quit smoking?  The best way to prevent lung cancer is to stop smoking. If you have already quit smoking, congratulations and keep it up! For help on quitting smoking, please call [facility] at 8-020-QUITNOW (1-711.559.1594) or the American Cancer Society at 1-571.489.8612 to find local resources near you.  One-on-one health coaching:  If you d prefer to work individually with a health care provider on tobacco cessation, we offer:      Medication Therapy Management:  Our specially trained pharmacists work closely with you and your doctor to help you quit smoking.  Call [phone] or [phone] (toll free).

## 2022-07-18 NOTE — PROGRESS NOTES
SUBJECTIVE:   CC: Velma Rizvi is an 56 year old woman who presents for preventive health visit.   Patient has been advised of split billing requirements and indicates understanding: Yes  Healthy Habits:     Getting at least 3 servings of Calcium per day:  Yes    Bi-annual eye exam:  Yes    Dental care twice a year:  Yes    Sleep apnea or symptoms of sleep apnea:  None    Diet:  Other    Frequency of exercise:  None    Taking medications regularly:  Yes    Medication side effects:  None    PHQ-2 Total Score: 0    Additional concerns today:  Yes    Occassionally when standing up will get sharp pains running down back of legs    Would like to see dermatology       Today's PHQ-2 Score:   PHQ-2 ( 1999 [employer]) 7/18/2022   Q1: Little interest or pleasure in doing things 0   Q2: Feeling down, depressed or hopeless 0   PHQ-2 Score 0   PHQ-2 Total Score (12-17 Years)- Positive if 3 or more points; Administer PHQ-A if positive -   Q1: Little interest or pleasure in doing things Not at all   Q2: Feeling down, depressed or hopeless Not at all   PHQ-2 Score 0       Abuse: Current or Past (Physical, Sexual or Emotional) - No  Do you feel safe in your environment? Yes        Social History     Tobacco Use     Smoking status: Current Every Day Smoker     Packs/day: 0.75     Years: 36.00     Pack years: 27.00     Smokeless tobacco: Never Used   Substance Use Topics     Alcohol use: Yes     Comment: rarely         Alcohol Use 7/18/2022   Prescreen: >3 drinks/day or >7 drinks/week? No   Prescreen: >3 drinks/day or >7 drinks/week? -       Reviewed orders with patient.  Reviewed health maintenance and updated orders accordingly - Yes      Breast Cancer Screening:    FHS-7:   Breast CA Risk Assessment (FHS-7) 4/11/2022 7/18/2022   Did any of your first-degree relatives have breast or ovarian cancer? No Yes   Did any of your relatives have bilateral breast cancer? No No   Did any man in your family have breast cancer? No No   Did  any woman in your family have breast and ovarian cancer? No No   Did any woman in your family have breast cancer before age 50 y? Yes Yes   Do you have 2 or more relatives with breast and/or ovarian cancer? No No   Do you have 2 or more relatives with breast and/or bowel cancer? No No       Mammogram Screening: Recommended annual mammography  Pertinent mammograms are reviewed under the imaging tab.    History of abnormal Pap smear: NO - age 30-65 PAP every 5 years with negative HPV co-testing recommended  PAP / HPV Latest Ref Rng & Units 11/29/2017 10/3/2012 3/17/2009   PAP (Historical) - NIL NIL NIL   HPV16 NEG:Negative Negative - -   HPV18 NEG:Negative Negative - -   HRHPV NEG:Negative Negative - -     Reviewed and updated as needed this visit by clinical staff   Tobacco  Allergies    Med Hx  Surg Hx  Fam Hx  Soc Hx          Reviewed and updated as needed this visit by Provider                       Review of Systems   Constitutional: Negative for chills and fever.   HENT: Positive for hearing loss. Negative for congestion, ear pain and sore throat.    Eyes: Negative for pain and visual disturbance.   Respiratory: Positive for shortness of breath. Negative for cough.    Cardiovascular: Positive for peripheral edema. Negative for chest pain and palpitations.   Gastrointestinal: Positive for heartburn. Negative for abdominal pain, constipation, diarrhea, hematochezia and nausea.   Breasts:  Negative for tenderness, breast mass and discharge.   Genitourinary: Positive for pelvic pain. Negative for dysuria, frequency, genital sores, hematuria, urgency, vaginal bleeding and vaginal discharge.   Musculoskeletal: Negative for arthralgias, joint swelling and myalgias.   Skin: Negative for rash.   Neurological: Positive for headaches. Negative for dizziness, weakness and paresthesias.   Psychiatric/Behavioral: Positive for mood changes. The patient is not nervous/anxious.           OBJECTIVE:   /76   Pulse 102   " Temp 97.4  F (36.3  C) (Temporal)   Ht 1.702 m (5' 7\")   Wt 93.4 kg (206 lb)   LMP 03/14/2017   SpO2 97%   BMI 32.26 kg/m    Physical Exam  Exam conducted with a chaperone present.   Constitutional:       General: She is not in acute distress.     Appearance: Normal appearance. She is well-developed.   HENT:      Right Ear: Hearing, tympanic membrane, ear canal and external ear normal.      Left Ear: Hearing, tympanic membrane, ear canal and external ear normal.      Nose: Nose normal.      Mouth/Throat:      Pharynx: Uvula midline. No oropharyngeal exudate or posterior oropharyngeal erythema.   Eyes:      General: Lids are normal.         Right eye: No discharge.         Left eye: No discharge.      Conjunctiva/sclera: Conjunctivae normal.      Pupils: Pupils are equal, round, and reactive to light.   Neck:      Thyroid: No thyromegaly.      Trachea: No tracheal deviation.   Cardiovascular:      Rate and Rhythm: Normal rate and regular rhythm.      Pulses: Normal pulses.      Heart sounds: Normal heart sounds, S1 normal and S2 normal. No murmur heard.    No friction rub. No S3 or S4 sounds.   Pulmonary:      Effort: Pulmonary effort is normal. No respiratory distress.      Breath sounds: Normal breath sounds. No wheezing or rales.   Chest:   Breasts:      Right: No supraclavicular adenopathy.      Left: No supraclavicular adenopathy.       Abdominal:      General: Bowel sounds are normal.      Palpations: Abdomen is soft. There is no mass.      Tenderness: There is no abdominal tenderness.   Genitourinary:     Labia:         Right: No rash, tenderness, lesion or injury.         Left: No rash, tenderness, lesion or injury.       Vagina: No vaginal discharge or bleeding.      Cervix: No discharge or friability.      Comments: Vaginal tissue consistent with atrophy.  Pap obtained  Musculoskeletal:         General: Normal range of motion.      Cervical back: Normal range of motion and neck supple. " "  Lymphadenopathy:      Cervical: No cervical adenopathy.      Upper Body:      Right upper body: No supraclavicular adenopathy.      Left upper body: No supraclavicular adenopathy.   Skin:     General: Skin is warm and dry.      Findings: No rash.   Neurological:      Mental Status: She is alert and oriented to person, place, and time.      Cranial Nerves: No cranial nerve deficit.      Sensory: No sensory deficit.      Motor: No abnormal muscle tone.      Deep Tendon Reflexes: Reflexes are normal and symmetric.   Psychiatric:         Thought Content: Thought content normal.         Judgment: Judgment normal.               ASSESSMENT/PLAN:       ICD-10-CM    1. Routine general medical examination at a health care facility  Z00.00    2. Cervical cancer screening  Z12.4 Pap Screen with HPV - recommended age 30 - 65 years   3. COPD, moderate (H)  J44.9 umeclidinium-vilanterol (ANORO ELLIPTA) 62.5-25 MCG/INH oral inhaler     albuterol (PROAIR HFA) 108 (90 Base) MCG/ACT inhaler   4. Chronic rhinitis  J31.0 fluticasone (FLONASE) 50 MCG/ACT nasal spray   5. Tobacco use disorder  F17.200    6. Peripheral edema  R60.9    7. Asymptomatic varicose veins of both lower extremities  I83.93    8. Obesity, Class I, BMI 30-34.9  E66.9    9. Personal history of tobacco use  Z87.891 Prof fee: Shared Decision Making for Lung Cancer Screening     CT Chest Lung Cancer Scrn Low Dose wo     Plan:  1.  She will try personal moisturizer/lubricant for vaginal dryness.  If not effective, telephone or video-based visit to discuss vaginal based estrogen.  2.  COVID-19 vaccine offered and encouraged to the patient.  However, patient declined.  Risks of not vaccinating discussed.         COUNSELING:  Reviewed preventive health counseling, as reflected in patient instructions    Estimated body mass index is 32.26 kg/m  as calculated from the following:    Height as of this encounter: 1.702 m (5' 7\").    Weight as of this encounter: 93.4 kg (206 " lb).    Weight management plan: Discussed healthy diet and exercise guidelines    She reports that she has been smoking. She has a 27.00 pack-year smoking history. She has never used smokeless tobacco.  Tobacco Cessation Action Plan:   Information offered: Patient not interested at this time      Counseling Resources:  ATP IV Guidelines  Pooled Cohorts Equation Calculator  Breast Cancer Risk Calculator  BRCA-Related Cancer Risk Assessment: FHS-7 Tool  FRAX Risk Assessment  ICSI Preventive Guidelines  Dietary Guidelines for Americans, 2010  From [employer] My[employer]ate  ASA Prophylaxis  Lung CA Screening    Gui Marques MD  [facility]  Lung Cancer Screening Shared Decision Making Visit     Velma Rizvi, a 56 year old female, is eligible for lung cancer screening    History   Smoking Status     Current Every Day Smoker     Packs/day: 0.75     Years: 36.00   Smokeless Tobacco     Never Used       I have discussed with patient the risks and benefits of screening for lung cancer with low-dose CT.     The risks include:    radiation exposure: one low dose chest CT has as much ionizing radiation as about 15 chest x-rays, or 6 months of background radiation living in [place]      false positives: most findings/nodules are NOT cancer, but some might still require additional diagnostic evaluation, including biopsy    over-diagnosis: some slow growing cancers that might never have been clinically significant will be detected and treated unnecessarily     The benefit of early detection of lung cancer is contingent upon adherence to annual screening or more frequent follow up if indicated.     Furthermore, to benefit from screening, Velma must be willing and able to undergo diagnostic procedures, if indicated. Although no specific guide is available for determining severity of comorbidities, it is reasonable to withhold screening in patients who have greater mortality risk from other diseases.     We  did discuss that the best way to prevent lung cancer is to not smoke.    Some patients may value a numeric estimation of lung cancer risk when evaluating if lung cancer screening is right for them, here is one calculator:    ShouldIScreen

## 2022-07-20 LAB
BKR LAB AP GYN ADEQUACY: NORMAL
BKR LAB AP GYN INTERPRETATION: NORMAL
BKR LAB AP HPV REFLEX: NORMAL
BKR LAB AP LMP: NORMAL
BKR LAB AP PREVIOUS ABNORMAL: NORMAL
PATH REPORT.COMMENTS IMP SPEC: NORMAL
PATH REPORT.COMMENTS IMP SPEC: NORMAL
PATH REPORT.RELEVANT HX SPEC: NORMAL

## 2022-07-22 LAB
HUMAN PAPILLOMA VIRUS 16 DNA: NEGATIVE
HUMAN PAPILLOMA VIRUS 18 DNA: NEGATIVE
HUMAN PAPILLOMA VIRUS FINAL DIAGNOSIS: NORMAL
HUMAN PAPILLOMA VIRUS OTHER HR: NEGATIVE

## 2022-07-28 ENCOUNTER — HOSPITAL ENCOUNTER (OUTPATIENT)
Dept: CT IMAGING | Facility: CLINIC | Age: 57
Discharge: HOME | End: 2022-07-28
Attending: FAMILY MEDICINE | Admitting: FAMILY MEDICINE
Payer: COMMERCIAL

## 2022-07-28 DIAGNOSIS — Z87.891 PERSONAL HISTORY OF TOBACCO USE: ICD-10-CM

## 2022-07-28 PROCEDURE — 71271 CT THORAX LUNG CANCER SCR C-: CPT

## 2022-07-29 ENCOUNTER — TELEPHONE (OUTPATIENT)
Dept: FAMILY MEDICINE | Facility: CLINIC | Age: 57
End: 2022-07-29

## 2022-07-29 NOTE — TELEPHONE ENCOUNTER
[facility] calling to report incidental finding from CT. Impression #2.    Kira Grubbs, LENARDN, RN

## 2022-07-30 NOTE — RESULT ENCOUNTER NOTE
"Velma,  Your results show now concerning findings for lung cancer.  There is a note about about \"emphysematous changes\" of your lungs, which is related to your COPD.  Nothing more to do with this besides making sure your medication is working and you work on quitting smoking.  Please let me know if you have any questions.    Sincerely,  Dr. Marques"

## 2022-10-09 ENCOUNTER — HEALTH MAINTENANCE LETTER (OUTPATIENT)
Age: 57
End: 2022-10-09

## 2022-11-30 ENCOUNTER — HOSPITAL ENCOUNTER (EMERGENCY)
Facility: CLINIC | Age: 57
Discharge: HOME | End: 2022-11-30
Attending: FAMILY MEDICINE | Admitting: FAMILY MEDICINE
Payer: COMMERCIAL

## 2022-11-30 ENCOUNTER — APPOINTMENT (OUTPATIENT)
Dept: GENERAL RADIOLOGY | Facility: CLINIC | Age: 57
End: 2022-11-30
Attending: FAMILY MEDICINE
Payer: COMMERCIAL

## 2022-11-30 VITALS
HEART RATE: 72 BPM | DIASTOLIC BLOOD PRESSURE: 67 MMHG | TEMPERATURE: 98.1 F | SYSTOLIC BLOOD PRESSURE: 113 MMHG | OXYGEN SATURATION: 97 % | RESPIRATION RATE: 20 BRPM

## 2022-11-30 DIAGNOSIS — J44.1 COPD EXACERBATION (H): ICD-10-CM

## 2022-11-30 LAB
FLUAV AG SPEC QL IA: NEGATIVE
FLUBV AG SPEC QL IA: NEGATIVE
RSV AG SPEC QL: NEGATIVE

## 2022-11-30 PROCEDURE — 99284 EMERGENCY DEPT VISIT MOD MDM: CPT | Performed by: FAMILY MEDICINE

## 2022-11-30 PROCEDURE — 71045 X-RAY EXAM CHEST 1 VIEW: CPT

## 2022-11-30 PROCEDURE — 99284 EMERGENCY DEPT VISIT MOD MDM: CPT | Mod: 25 | Performed by: FAMILY MEDICINE

## 2022-11-30 PROCEDURE — 87804 INFLUENZA ASSAY W/OPTIC: CPT | Performed by: FAMILY MEDICINE

## 2022-11-30 PROCEDURE — 87807 RSV ASSAY W/OPTIC: CPT | Performed by: FAMILY MEDICINE

## 2022-11-30 RX ORDER — PREDNISONE 20 MG/1
TABLET ORAL
Qty: 10 TABLET | Refills: 0 | Status: SHIPPED | OUTPATIENT
Start: 2022-11-30 | End: 2022-12-21

## 2022-11-30 RX ORDER — AZITHROMYCIN 250 MG/1
TABLET, FILM COATED ORAL
Qty: 6 TABLET | Refills: 0 | Status: SHIPPED | OUTPATIENT
Start: 2022-11-30 | End: 2022-12-05

## 2022-11-30 ASSESSMENT — ACTIVITIES OF DAILY LIVING (ADL): ADLS_ACUITY_SCORE: 35

## 2022-11-30 NOTE — ED PROVIDER NOTES
History     Chief Complaint   Patient presents with     Cough     HPI  Velma Rizvi is a 57 year old female who presents to the emergency department because of a cough this been going on for 3 weeks.  Nothing is different about it today she just could not sleep last night.  Patient has a history of COPD.  She has been using her inhalers and it helps maybe a little bit but did not help last night.  Denies any nausea or vomiting.  Patient states she coughs so much she was having a pain in her right side.  It is only there when she coughs though.  Denies any back pain.  Denies any nausea any vomiting.  Patient has not been on steroids recently for her COPD.    Allergies:  No Known Allergies    Problem List:    Patient Active Problem List    Diagnosis Date Noted     COPD, moderate (H) 2019     Priority: Medium     Peripheral edema 2018     Priority: Medium     Obesity, Class I, BMI 30-34.9 2017     Priority: Medium     Varicose veins of legs 2011     Priority: Medium     See abnormal US study.       Tobacco use disorder 2008     Priority: Medium        Past Medical History:    Past Medical History:   Diagnosis Date     Phlebitis and thrombophlebitis 10/12/2012     PONV (postoperative nausea and vomiting)        Past Surgical History:    Past Surgical History:   Procedure Laterality Date     COLONOSCOPY N/A 2018    Procedure: COLONOSCOPY;  COLONOSCOPY;  Surgeon: Paresh Curran MD;  Location:  GI     HC TREATMENT INCOMPLETE  SURG, ANY TRIMESTER      D & C  s/p miscarriage     LAPAROSCOPIC CHOLECYSTECTOMY N/A 2019    Procedure: LAPAROSCOPIC CHOLECYSTECTOMY;  Surgeon: Rob Gonzales DO;  Location:  OR     ZZC NONSPECIFIC PROCEDURE      Right hand surgery- tendon repair       Family History:    Family History   Problem Relation Age of Onset     Cancer Mother          - lung cancer with mets.     Respiratory Father         asthma     Cancer  Maternal Grandmother         breast- 48 fatal age 51     Diabetes Maternal Uncle      Hypertension Brother      Diabetes Brother        Social History:  Marital Status:   [2]  Social History     Tobacco Use     Smoking status: Every Day     Packs/day: 0.75     Years: 36.00     Pack years: 27.00     Types: Cigarettes     Smokeless tobacco: Never   Substance Use Topics     Alcohol use: Yes     Comment: rarely     Drug use: No        Medications:    azithromycin (ZITHROMAX Z-TRENTON) 250 MG tablet  predniSONE (DELTASONE) 20 MG tablet  albuterol (PROAIR HFA) 108 (90 Base) MCG/ACT inhaler  COMPRESSION STOCKINGS  fluticasone (FLONASE) 50 MCG/ACT nasal spray  spacer (OPTICHAMBER JOHNNY) holding chamber  umeclidinium-vilanterol (ANORO ELLIPTA) 62.5-25 MCG/INH oral inhaler  VITAMIN D, CHOLECALCIFEROL, PO          Review of Systems   All other systems reviewed and are negative.      Physical Exam   BP: (!) 140/73  Pulse: 83  Temp: 98.1  F (36.7  C)  Resp: 21  SpO2: 95 %      Physical Exam  Vitals and nursing note reviewed.   Constitutional:       General: She is not in acute distress.     Appearance: She is well-developed and well-nourished. She is not diaphoretic.   HENT:      Mouth/Throat:      Mouth: Oropharynx is clear and moist.   Eyes:      Conjunctiva/sclera: Conjunctivae normal.   Cardiovascular:      Rate and Rhythm: Normal rate and regular rhythm.      Pulses: Intact distal pulses.      Heart sounds: Normal heart sounds. No murmur heard.    No friction rub. No gallop.   Pulmonary:      Effort: Pulmonary effort is normal. No respiratory distress.      Breath sounds: Normal breath sounds. No wheezing or rales.   Chest:      Chest wall: No tenderness.   Abdominal:      General: Bowel sounds are normal. There is no distension.      Palpations: Abdomen is soft. There is no mass.      Tenderness: There is no abdominal tenderness. There is no guarding.   Musculoskeletal:         General: No tenderness or edema. Normal  range of motion.      Cervical back: Normal range of motion and neck supple.   Skin:     General: Skin is warm and dry.      Findings: No rash.   Neurological:      Mental Status: She is alert and oriented to person, place, and time.   Psychiatric:         Mood and Affect: Mood and affect normal.         Judgment: Judgment normal.         ED Course                 Procedures        Results for orders placed or performed during the hospital encounter of 11/30/22 (from the past 24 hour(s))   RSV rapid antigen    Specimen: Nasopharyngeal; Swab   Result Value Ref Range    Respiratory Syncytial Virus antigen Negative Negative    Narrative    Test results must be correlated with clinical data. If necessary, results should be confirmed by a molecular assay or viral culture.   Influenza A & B Antigen    Specimen: Nasopharyngeal; Swab   Result Value Ref Range    Influenza A antigen Negative Negative    Influenza B antigen Negative Negative    Narrative    Test results must be correlated with clinical data. If necessary, results should be confirmed by a molecular assay or viral culture.   XR Chest Port 1 View    Narrative    CHEST ONE VIEW  11/30/2022 7:11 AM     HISTORY: Cough for 3 weeks.    COMPARISON: [facility], 2019      Impression    IMPRESSION: No acute disease.       Medications - No data to display     RSV and influenza all came back negative.  X-ray was normal.  I think this is likely the patient's COPD exacerbation.  Agree with a course of prednisone, she will continue to use her inhaler.  We will also put on Zithromax to cover for atypicals.  Patient will be discharged at this time.    Assessments & Plan (with Medical Decision Making)  Copd flare     I have reviewed the nursing notes.    I have reviewed the findings, diagnosis, plan and need for follow up with the patient.      New Prescriptions    AZITHROMYCIN (ZITHROMAX Z-TRENTON) 250 MG TABLET    Two tablets on the first day, then one tablet daily for the next 4 days     PREDNISONE (DELTASONE) 20 MG TABLET    Take two tablets (= 40mg) each day for 5 (five) days       Final diagnoses:   COPD exacerbation (H)       11/30/2022   [facility] EMERGENCY DEPT     Jaiden Keen MD  11/30/22 1488

## 2022-11-30 NOTE — ED TRIAGE NOTES
Patient presents for concerns of ongoing cough, 3 weeks, that is now productive with yellow phlegm. States no fevers. Was around her grandchild who has RSV on Saturday. Hx of COPD. Reports coughing has been causing headaches and pain near the ribcage.     Triage Assessment     Row Name 11/30/22 0628       Triage Assessment (Adult)    Airway WDL WDL       Respiratory WDL    Respiratory WDL X;cough;rhythm/pattern    Rhythm/Pattern, Respiratory dyspnea on exertion    Cough Frequency frequent    Cough Type bronchospastic;congested       Skin Circulation/Temperature WDL    Skin Circulation/Temperature WDL WDL       Cardiac WDL    Cardiac WDL WDL       Peripheral/Neurovascular WDL    Peripheral Neurovascular WDL WDL       Cognitive/Neuro/Behavioral WDL    Cognitive/Neuro/Behavioral WDL WDL

## 2022-12-21 ENCOUNTER — HOSPITAL ENCOUNTER (EMERGENCY)
Facility: CLINIC | Age: 57
Discharge: HOME | End: 2022-12-21
Attending: PHYSICIAN ASSISTANT | Admitting: PHYSICIAN ASSISTANT
Payer: COMMERCIAL

## 2022-12-21 ENCOUNTER — APPOINTMENT (OUTPATIENT)
Dept: CT IMAGING | Facility: CLINIC | Age: 57
End: 2022-12-21
Attending: PHYSICIAN ASSISTANT
Payer: COMMERCIAL

## 2022-12-21 VITALS
OXYGEN SATURATION: 97 % | HEART RATE: 91 BPM | TEMPERATURE: 98 F | SYSTOLIC BLOOD PRESSURE: 144 MMHG | DIASTOLIC BLOOD PRESSURE: 83 MMHG | RESPIRATION RATE: 18 BRPM

## 2022-12-21 DIAGNOSIS — J44.1 COPD EXACERBATION (H): ICD-10-CM

## 2022-12-21 DIAGNOSIS — S39.011A ABDOMINAL MUSCLE STRAIN: ICD-10-CM

## 2022-12-21 LAB
ALBUMIN SERPL-MCNC: 4 G/DL (ref 3.4–5)
ALBUMIN UR-MCNC: NEGATIVE MG/DL
ALP SERPL-CCNC: 79 U/L (ref 40–150)
ALT SERPL W P-5'-P-CCNC: 24 U/L (ref 0–50)
ANION GAP SERPL CALCULATED.3IONS-SCNC: 8 MMOL/L (ref 3–14)
APPEARANCE UR: CLEAR
AST SERPL W P-5'-P-CCNC: 21 U/L (ref 0–45)
BASOPHILS # BLD AUTO: 0.1 10E3/UL (ref 0–0.2)
BASOPHILS NFR BLD AUTO: 1 %
BILIRUB SERPL-MCNC: 0.3 MG/DL (ref 0.2–1.3)
BILIRUB UR QL STRIP: NEGATIVE
BUN SERPL-MCNC: 13 MG/DL (ref 7–30)
CALCIUM SERPL-MCNC: 9.7 MG/DL (ref 8.5–10.1)
CHLORIDE BLD-SCNC: 104 MMOL/L (ref 94–109)
CO2 SERPL-SCNC: 28 MMOL/L (ref 20–32)
COLOR UR AUTO: YELLOW
CREAT SERPL-MCNC: 0.6 MG/DL (ref 0.52–1.04)
D DIMER PPP FEU-MCNC: 0.54 UG/ML FEU (ref 0–0.5)
EOSINOPHIL # BLD AUTO: 0.3 10E3/UL (ref 0–0.7)
EOSINOPHIL NFR BLD AUTO: 4 %
ERYTHROCYTE [DISTWIDTH] IN BLOOD BY AUTOMATED COUNT: 13.2 % (ref 10–15)
GFR SERPL CREATININE-BSD FRML MDRD: >90 ML/MIN/1.73M2
GLUCOSE BLD-MCNC: 89 MG/DL (ref 70–99)
GLUCOSE UR STRIP-MCNC: NEGATIVE MG/DL
HCT VFR BLD AUTO: 42.9 % (ref 35–47)
HGB BLD-MCNC: 14.5 G/DL (ref 11.7–15.7)
HGB UR QL STRIP: NEGATIVE
IMM GRANULOCYTES # BLD: 0 10E3/UL
IMM GRANULOCYTES NFR BLD: 0 %
KETONES UR STRIP-MCNC: NEGATIVE MG/DL
LEUKOCYTE ESTERASE UR QL STRIP: NEGATIVE
LYMPHOCYTES # BLD AUTO: 2.7 10E3/UL (ref 0.8–5.3)
LYMPHOCYTES NFR BLD AUTO: 29 %
MCH RBC QN AUTO: 31.4 PG (ref 26.5–33)
MCHC RBC AUTO-ENTMCNC: 33.8 G/DL (ref 31.5–36.5)
MCV RBC AUTO: 93 FL (ref 78–100)
MONOCYTES # BLD AUTO: 0.7 10E3/UL (ref 0–1.3)
MONOCYTES NFR BLD AUTO: 8 %
NEUTROPHILS # BLD AUTO: 5.4 10E3/UL (ref 1.6–8.3)
NEUTROPHILS NFR BLD AUTO: 58 %
NITRATE UR QL: NEGATIVE
NRBC # BLD AUTO: 0 10E3/UL
NRBC BLD AUTO-RTO: 0 /100
NT-PROBNP SERPL-MCNC: 76 PG/ML (ref 0–900)
PH UR STRIP: 7 [PH] (ref 5–7)
PLATELET # BLD AUTO: 305 10E3/UL (ref 150–450)
POTASSIUM BLD-SCNC: 3.7 MMOL/L (ref 3.4–5.3)
PROT SERPL-MCNC: 7.9 G/DL (ref 6.8–8.8)
RBC # BLD AUTO: 4.62 10E6/UL (ref 3.8–5.2)
RBC URINE: <1 /HPF
SODIUM SERPL-SCNC: 140 MMOL/L (ref 133–144)
SP GR UR STRIP: 1.01 (ref 1–1.03)
SQUAMOUS EPITHELIAL: 1 /HPF
TROPONIN I SERPL HS-MCNC: 7 NG/L
UROBILINOGEN UR STRIP-MCNC: NORMAL MG/DL
WBC # BLD AUTO: 9.3 10E3/UL (ref 4–11)
WBC URINE: <1 /HPF

## 2022-12-21 PROCEDURE — 85025 COMPLETE CBC W/AUTO DIFF WBC: CPT | Performed by: PHYSICIAN ASSISTANT

## 2022-12-21 PROCEDURE — 85379 FIBRIN DEGRADATION QUANT: CPT | Performed by: PHYSICIAN ASSISTANT

## 2022-12-21 PROCEDURE — 74177 CT ABD & PELVIS W/CONTRAST: CPT

## 2022-12-21 PROCEDURE — 99284 EMERGENCY DEPT VISIT MOD MDM: CPT | Performed by: PHYSICIAN ASSISTANT

## 2022-12-21 PROCEDURE — 36415 COLL VENOUS BLD VENIPUNCTURE: CPT | Performed by: PHYSICIAN ASSISTANT

## 2022-12-21 PROCEDURE — 250N000011 HC RX IP 250 OP 636: Performed by: PHYSICIAN ASSISTANT

## 2022-12-21 PROCEDURE — 83880 ASSAY OF NATRIURETIC PEPTIDE: CPT | Performed by: PHYSICIAN ASSISTANT

## 2022-12-21 PROCEDURE — 84484 ASSAY OF TROPONIN QUANT: CPT | Performed by: PHYSICIAN ASSISTANT

## 2022-12-21 PROCEDURE — 82435 ASSAY OF BLOOD CHLORIDE: CPT | Performed by: PHYSICIAN ASSISTANT

## 2022-12-21 PROCEDURE — 250N000009 HC RX 250: Performed by: PHYSICIAN ASSISTANT

## 2022-12-21 PROCEDURE — 81001 URINALYSIS AUTO W/SCOPE: CPT | Performed by: PHYSICIAN ASSISTANT

## 2022-12-21 PROCEDURE — 99285 EMERGENCY DEPT VISIT HI MDM: CPT | Mod: 25 | Performed by: PHYSICIAN ASSISTANT

## 2022-12-21 RX ORDER — PREDNISONE 10 MG/1
TABLET ORAL
Qty: 30 TABLET | Refills: 0 | Status: SHIPPED | OUTPATIENT
Start: 2022-12-21 | End: 2023-07-05

## 2022-12-21 RX ORDER — IOPAMIDOL 755 MG/ML
500 INJECTION, SOLUTION INTRAVASCULAR ONCE
Status: COMPLETED | OUTPATIENT
Start: 2022-12-21 | End: 2022-12-21

## 2022-12-21 RX ORDER — CEFDINIR 300 MG/1
300 CAPSULE ORAL 2 TIMES DAILY
Qty: 20 CAPSULE | Refills: 0 | Status: SHIPPED | OUTPATIENT
Start: 2022-12-21 | End: 2022-12-31

## 2022-12-21 RX ORDER — IPRATROPIUM BROMIDE AND ALBUTEROL SULFATE 2.5; .5 MG/3ML; MG/3ML
1 SOLUTION RESPIRATORY (INHALATION) EVERY 4 HOURS PRN
Qty: 90 ML | Refills: 0 | Status: SHIPPED | OUTPATIENT
Start: 2022-12-21 | End: 2023-07-05

## 2022-12-21 RX ORDER — IBUPROFEN 200 MG
800 TABLET ORAL EVERY 8 HOURS PRN
COMMUNITY

## 2022-12-21 RX ORDER — GUAIFENESIN 600 MG/1
1200 TABLET, EXTENDED RELEASE ORAL 2 TIMES DAILY
COMMUNITY
End: 2023-10-09

## 2022-12-21 RX ADMIN — IOPAMIDOL 75 ML: 755 INJECTION, SOLUTION INTRAVENOUS at 15:52

## 2022-12-21 RX ADMIN — SODIUM CHLORIDE 70 ML: 9 INJECTION, SOLUTION INTRAVENOUS at 15:53

## 2022-12-21 ASSESSMENT — ACTIVITIES OF DAILY LIVING (ADL)
ADLS_ACUITY_SCORE: 35
ADLS_ACUITY_SCORE: 35

## 2022-12-21 NOTE — ED PROVIDER NOTES
History     Chief Complaint   Patient presents with     Abdominal Pain     Shortness of Breath     HPI  Velma Rizvi is a 57 year old female who presents for evaluation of ongoing cough for the past 6 to 7 weeks.  2 weeks ago she was started on a azithromycin and prednisone but states that she has not had any change in her symptoms.  Deep cough that is occasionally productive of yellow or white phlegm.  She experiences lower chest and mid back discomfort from coughing.  She gets into significant coughing jags.  Notes dyspnea on exertion.  Denies any lower extremity edema or calf pain.  No prior history of venous thromboembolism.  Notes wheezing despite taking her regular inhalers.  Denies rhinorrhea, congestion, sore throat, otalgia, or change in taste/smell sensation.  No recent fever, chills, nausea, vomiting.  She also has developed new onset sharp right lower quadrant abdominal discomfort in the past 2 days which is worse with movement or coughing.  She really does not have any pain at rest.  States the pain gets up to 10 on a scale of 10 and stabbing in nature when it does occur.  Seems to be worse when she rolls over in bed.  Does not sit up.  She denies any frequency, urgency, gross hematuria, or dysuria.  No change in vaginal discharge.  No skin rashes.  Patient does have a history of COPD and is still a 1/2 pack/day smoker.  No recent night sweats, malaise, or unexplained weight loss.          Allergies:  No Known Allergies    Problem List:    Patient Active Problem List    Diagnosis Date Noted     COPD, moderate (H) 07/12/2019     Priority: Medium     Peripheral edema 09/06/2018     Priority: Medium     Obesity, Class I, BMI 30-34.9 12/02/2017     Priority: Medium     Varicose veins of legs 08/11/2011     Priority: Medium     See abnormal US study.       Tobacco use disorder 01/24/2008     Priority: Medium        Past Medical History:    Past Medical History:   Diagnosis Date     Phlebitis and  thrombophlebitis 10/12/2012     PONV (postoperative nausea and vomiting)        Past Surgical History:    Past Surgical History:   Procedure Laterality Date     COLONOSCOPY N/A 2018    Procedure: COLONOSCOPY;  COLONOSCOPY;  Surgeon: Paresh Curran MD;  Location: [facility]     HC TREATMENT INCOMPLETE  SURG, ANY TRIMESTER      D & C  s/p miscarriage     LAPAROSCOPIC CHOLECYSTECTOMY N/A 2019    Procedure: LAPAROSCOPIC CHOLECYSTECTOMY;  Surgeon: Rob Gonzales DO;  Location: [facility]     ZZC NONSPECIFIC PROCEDURE      Right hand surgery- tendon repair       Family History:    Family History   Problem Relation Age of Onset     Cancer Mother          - lung cancer with mets.     Respiratory Father         asthma     Cancer Maternal Grandmother         breast- 48 fatal age 51     Diabetes Maternal Uncle      Hypertension Brother      Diabetes Brother        Social History:  Marital Status:   [2]  Social History     Tobacco Use     Smoking status: Every Day     Packs/day: 0.75     Years: 36.00     Pack years: 27.00     Types: Cigarettes     Smokeless tobacco: Never   Substance Use Topics     Alcohol use: Yes     Comment: rarely     Drug use: No        Medications:    albuterol (PROAIR HFA) 108 (90 Base) MCG/ACT inhaler  cefdinir (OMNICEF) 300 MG capsule  fluticasone (FLONASE) 50 MCG/ACT nasal spray  guaiFENesin (MUCINEX) 600 MG 12 hr tablet  ibuprofen (ADVIL/MOTRIN) 200 MG tablet  ipratropium - albuterol 0.5 mg/2.5 mg/3 mL (DUONEB) 0.5-2.5 (3) MG/3ML neb solution  predniSONE (DELTASONE) 10 MG tablet  umeclidinium-vilanterol (ANORO ELLIPTA) 62.5-25 MCG/INH oral inhaler  VITAMIN D, CHOLECALCIFEROL, PO  COMPRESSION STOCKINGS  spacer (OPTICHAMBER JOHNNY) holding chamber          Review of Systems   All other systems reviewed and are negative.      Physical Exam   BP: (!) 141/108  Pulse: 86  Temp: 97.8  F (36.6  C)  Resp: 14  SpO2: 98 %      Physical Exam  Vitals and nursing note  reviewed.   Constitutional:       General: She is not in acute distress.     Appearance: She is not diaphoretic.   HENT:      Head: Normocephalic and atraumatic.      Right Ear: External ear normal.      Left Ear: External ear normal.      Nose: Nose normal.      Mouth/Throat:      Mouth: Mucous membranes are moist.      Pharynx: No pharyngeal swelling or oropharyngeal exudate.   Eyes:      General: No scleral icterus.        Right eye: No discharge.         Left eye: No discharge.      Conjunctiva/sclera: Conjunctivae normal.      Pupils: Pupils are equal, round, and reactive to light.   Neck:      Thyroid: No thyromegaly.   Cardiovascular:      Rate and Rhythm: Normal rate and regular rhythm.      Heart sounds: Normal heart sounds. No murmur heard.  Pulmonary:      Effort: Pulmonary effort is normal. No respiratory distress.      Breath sounds: Normal breath sounds. No wheezing or rales.   Chest:      Chest wall: No tenderness.   Abdominal:      General: Bowel sounds are normal. There is no distension.      Palpations: Abdomen is soft. There is no mass.      Tenderness: There is no abdominal tenderness. There is no guarding or rebound. Negative signs include Parikh's sign, Rovsing's sign, McBurney's sign, psoas sign and obturator sign.      Comments: No tenderness upon palpation when she is laying supine.  Deep palpation performed as well.  She does have significant pain in the area when she does a sit up coughs.  I do not feel a palpable defect or hernia.   Musculoskeletal:         General: No tenderness or deformity. Normal range of motion.      Cervical back: Normal range of motion and neck supple.   Lymphadenopathy:      Cervical: No cervical adenopathy.   Skin:     General: Skin is warm and dry.      Capillary Refill: Capillary refill takes less than 2 seconds.      Findings: No erythema or rash.   Neurological:      General: No focal deficit present.      Mental Status: She is alert and oriented to person,  place, and time.      Cranial Nerves: No cranial nerve deficit.   Psychiatric:         Behavior: Behavior normal.         Thought Content: Thought content normal.         ED Course                 Procedures              Critical Care time:  none               Results for orders placed or performed during the hospital encounter of 12/21/22 (from the past 24 hour(s))   UA with Microscopic reflex to Culture    Specimen: Urine, Midstream   Result Value Ref Range    Color Urine Yellow Colorless, Straw, Light Yellow, Yellow    Appearance Urine Clear Clear    Glucose Urine Negative Negative mg/dL    Bilirubin Urine Negative Negative    Ketones Urine Negative Negative mg/dL    Specific Gravity Urine 1.010 1.003 - 1.035    Blood Urine Negative Negative    pH Urine 7.0 5.0 - 7.0    Protein Albumin Urine Negative Negative mg/dL    Urobilinogen Urine Normal Normal, 2.0 mg/dL    Nitrite Urine Negative Negative    Leukocyte Esterase Urine Negative Negative    RBC Urine <1 <=2 /HPF    WBC Urine <1 <=5 /HPF    Squamous Epithelials Urine 1 <=1 /HPF    Narrative    Urine Culture not indicated   CBC with platelets differential    Narrative    The following orders were created for panel order CBC with platelets differential.  Procedure                               Abnormality         Status                     ---------                               -----------         ------                     CBC with platelets and d...[[id number]]                      Final result                 Please view results for these tests on the individual orders.   D dimer quantitative   Result Value Ref Range    D-Dimer Quantitative 0.54 (H) 0.00 - 0.50 ug/mL FEU    Narrative    This D-dimer assay is intended for use in conjunction with a clinical pretest probability assessment model to exclude pulmonary embolism (PE) and deep venous thrombosis (DVT) in outpatients suspected of PE or DVT. The cut-off value is 0.50 ug/mL FEU.   Comprehensive metabolic  panel   Result Value Ref Range    Sodium 140 133 - 144 mmol/L    Potassium 3.7 3.4 - 5.3 mmol/L    Chloride 104 94 - 109 mmol/L    Carbon Dioxide (CO2) 28 20 - 32 mmol/L    Anion Gap 8 3 - 14 mmol/L    Urea Nitrogen 13 7 - 30 mg/dL    Creatinine 0.60 0.52 - 1.04 mg/dL    Calcium 9.7 8.5 - 10.1 mg/dL    Glucose 89 70 - 99 mg/dL    Alkaline Phosphatase 79 40 - 150 U/L    AST 21 0 - 45 U/L    ALT 24 0 - 50 U/L    Protein Total 7.9 6.8 - 8.8 g/dL    Albumin 4.0 3.4 - 5.0 g/dL    Bilirubin Total 0.3 0.2 - 1.3 mg/dL    GFR Estimate >90 >60 mL/min/1.73m2   Troponin I   Result Value Ref Range    Troponin I High Sensitivity 7 <54 ng/L   Nt probnp inpatient (BNP)   Result Value Ref Range    N terminal Pro BNP Inpatient 76 0 - 900 pg/mL   CBC with platelets and differential   Result Value Ref Range    WBC Count 9.3 4.0 - 11.0 10e3/uL    RBC Count 4.62 3.80 - 5.20 10e6/uL    Hemoglobin 14.5 11.7 - 15.7 g/dL    Hematocrit 42.9 35.0 - 47.0 %    MCV 93 78 - 100 fL    MCH 31.4 26.5 - 33.0 pg    MCHC 33.8 31.5 - 36.5 g/dL    RDW 13.2 10.0 - 15.0 %    Platelet Count 305 150 - 450 10e3/uL    % Neutrophils 58 %    % Lymphocytes 29 %    % Monocytes 8 %    % Eosinophils 4 %    % Basophils 1 %    % Immature Granulocytes 0 %    NRBCs per 100 WBC 0 <1 /100    Absolute Neutrophils 5.4 1.6 - 8.3 10e3/uL    Absolute Lymphocytes 2.7 0.8 - 5.3 10e3/uL    Absolute Monocytes 0.7 0.0 - 1.3 10e3/uL    Absolute Eosinophils 0.3 0.0 - 0.7 10e3/uL    Absolute Basophils 0.1 0.0 - 0.2 10e3/uL    Absolute Immature Granulocytes 0.0 <=0.4 10e3/uL    Absolute NRBCs 0.0 10e3/uL   CT Chest (PE) Abdomen Pelvis w Contrast    Narrative    CT CHEST PE ABDOMEN AND PELVIS WITH CONTRAST 12/21/2022 4:03 PM    CLINICAL HISTORY: Dyspnea, cough, elevated D-dimer, smoker.    TECHNIQUE: CT angiogram chest and routine CT abdomen pelvis with IV  contrast. Arterial phase through the chest and venous phase through  the abdomen and pelvis. 2D and 3D MIP reconstructions were  preformed  by the CT technologist. Dose reduction techniques were used.     CONTRAST: 75 mL Isovue- 370    COMPARISON: Chest x-rays dated 11/30/2022, CT chest dated 7/28/2022  and CT abdomen and pelvis dated 6/3/2016.    FINDINGS:  ANGIOGRAM CHEST: Pulmonary arteries are normal caliber and negative  for pulmonary emboli. Thoracic aorta is negative for dissection. No CT  evidence of right heart strain.     LUNGS AND PLEURA: Severe emphysematous changes of the mid to upper  bilateral lungs are again noted and indicate probable history of  tobacco use. Lungs are otherwise clear without significant nodule,  mass, infiltrate, effusion, or pneumothorax.    MEDIASTINUM/AXILLAE: Normal. No significant coronary artery  calcifications.    HEPATOBILIARY: Patient is status post cholecystectomy. Low-attenuation  subcentimeter liver lesion(s) compatible with benign cysts or other  benign lesions. No specific evaluation or follow-up is recommended in  a low risk patient. Liver is otherwise grossly normal in appearance.  No biliary ductal dilatation or choledocholithiasis is seen.     PANCREAS: Normal.    SPLEEN: Normal.    ADRENAL GLANDS: Adrenal glands are minimally thickened but  hypoattenuating, similar to the prior study dated 6/3/2016. This could  represent mild hyperplasia. Adrenal glands are otherwise unremarkable.    KIDNEYS/BLADDER: Low-attenuation subcentimeter renal lesion(s). These  are compatible with small benign cysts and no specific imaging  evaluation or follow-up is recommended. Kidneys otherwise enhance  normally. No hydronephrosis, nephrolithiasis, hydroureter, or ureteral  calculus is identified. Urinary bladder is normal in appearance.    BOWEL: Focal narrowing of the descending colon near the left side of  pelvis at the descending colon sigmoid colon junction is likely due to  contraction. Infiltrative process is considered less likely. Colon is  otherwise of normal appearance and caliber. Appendix is normal  in  appearance. Small bowel is grossly of normal caliber and appearance.  Stomach contains a moderate amount of fluid and air, but is otherwise  unremarkable.    LYMPH NODES: No lymphadenopathy is identified in the peritoneal cavity  or in the retroperitoneum.    PELVIC ORGANS: Normal.    OTHER: Prominent venous structure in the left inguinal region connects  to the external iliac vein and could be associated with varicosities.  Recommend clinical correlation. No abnormal free fluid or free air  collections are seen in the peritoneal cavity or in the  retroperitoneum. There is nonaneurysmal atherosclerosis. No adenopathy  is identified.    MUSCULOSKELETAL: No aggressive osseous lesions or acute osseous  fractures. There are mild degenerative changes in the spine.       Impression    IMPRESSION:  1.  Severe emphysematous changes in the lungs have a pattern  corresponding with patient's history of tobacco use.  2.  No evidence for pulmonary artery embolism is identified.  3.  No nodule or mass in the lungs.  4.  No definite etiology for patient's right lower quadrant abdominal  pain.  5.  Prominent superficial venous structure anterior upper thigh  extending to the external iliac vein could be associated with  varicosities in the left leg.  6.  Atherosclerosis.    I discussed the lack of pulmonary artery embolism, the presence of  severe emphysematous changes, and the lack of other significant  positive findings with Ismael Sierra on 12/21/2022 at 4:27 PM.    JARRETT CARL MD         SYSTEM ID:  [id number]       Medications   100 mL saline bag CT (70 mLs Intravenous Given 12/21/22 1553)   iopamidol (ISOVUE-370) solution 500 mL (75 mLs Intravenous Given 12/21/22 1552)       Assessments & Plan (with Medical Decision Making)     COPD exacerbation (H)  Abdominal muscle strain     57 year old female with COPD and current smoker of 1/2 pack/day who presents for evaluation of ongoing cough for the past 6-7 weeks not responding  to azithromycin and prednisone despite ongoing inhaler use.  Cough is productive of yellow/white phlegm.  Lower chest discomfort with the cough.  Noting dyspnea on exertion and some wheezing.  Also having some sharp right lower quadrant abdominal pain for the past 2 days which is worse with movement.  No GI or  symptoms otherwise.  No recent fever or chills.  See HPI for details.  On exam blood pressure 112/72, temperature 97.8, pulse 79, respiration 14, oxygen saturation 95% on room air.  Patient is in no acute distress.  She does have a deep sounding cough.  Exam negative.  Cardiopulmonary exam without adventitious lung sounds.  Is nontender to palpation when she is laying supine.  She does have pain in the area when she coughs or sits up.  No palpable defect and no palpable hernia.  IV was established.  Laboratory levels with normal CBC.  WBC 9300.  Hemoglobin stable at 14.5.  BNP normal at 76 and her troponin was 7 and normal.  Comprehensive metabolic panel all normal.  Urinalysis negative.  D-dimer minor elevation of 0.54.  We therefore proceeded with CT PE study and a CT abdomen/pelvis.  This displayed no lung mass, PE, pleural effusion, pneumothorax, or other significant emergent abnormality.  Abdomen/pelvis without acute emergent findings.  Patient reassured.  Normal work-up.  This likely represents a COPD exacerbation with lack of improvement on azithromycin and 40 mg prednisone therapy.  Therefore, we will treat this from a different angle with Omnicef therapy and a prednisone burst and taper.  I will also start her on DuoNeb treatments every 4 hours on a regular basis for the next 4 days, and then as needed thereafter.  Importance of pushing clear fluids discussed.  Increase rest.  Smoking cessation strongly encouraged.  She states that she is working on this.  Reassured that there is no acute findings with her abdomen/pelvis CT.  Her pain primarily is with coughing or movement.  No pain at rest.  No  pain elicited with deep palpation.  Therefore, this is likely related to an abdominal wall muscle strain.  Follow-up with primary care in the next 1-2 weeks if not improving.  She was in agreement.     I have reviewed the nursing notes.    I have reviewed the findings, diagnosis, plan and need for follow up with the patient.       New Prescriptions    CEFDINIR (OMNICEF) 300 MG CAPSULE    Take 1 capsule (300 mg) by mouth 2 times daily for 10 days    IPRATROPIUM - ALBUTEROL 0.5 MG/2.5 MG/3 ML (DUONEB) 0.5-2.5 (3) MG/3ML NEB SOLUTION    Take 1 vial (3 mLs) by nebulization every 4 hours as needed for shortness of breath, wheezing or cough    PREDNISONE (DELTASONE) 10 MG TABLET    50 mg every day x 2 days, then 40 mg every day x 2 days, then 30 mg every day x 2 days, then 20 mg every day x 2 days, then 10 mg every day x 2 days,       Final diagnoses:   COPD exacerbation (H)   Abdominal muscle strain     Disclaimer: This note consists of symbols derived from keyboarding, dictation and/or voice recognition software. As a result, there may be errors in the script that have gone undetected. Please consider this when interpreting information found in this chart.      12/21/2022   [facility] EMERGENCY DEPT     Ismael Sierra PA-C  12/21/22 7839

## 2022-12-21 NOTE — DISCHARGE INSTRUCTIONS
It was a pleasure working with you today!  I hope your condition improves rapidly!     Thankfully, all of your testing came back okay today.  But, you are fighting a flare of your COPD.  Antibiotics and prednisone typically help this.  Start the new antibiotic.  Please also start the prednisone to help with the inflammation.  It is best to take this with something in your stomach to avoid any stomach upset.  Take your first dose this evening, but then switch to morning dosing of the prednisone.  I would like for you to start using your nebulizer with the DuoNeb every 4 hours on a regular basis for the next 4 days, and then as needed after that.  Please continue to work on quitting smoking :)

## 2022-12-21 NOTE — ED TRIAGE NOTES
"Here with persist ant cough that she states has been on going since November. Was seen a few weeks ago and given a Z pack and steroids, \"It didn't work.\"     Triage Assessment     Row Name 12/21/22 1347       Respiratory WDL    Respiratory WDL X;cough    Cough Frequency frequent    Cough Type productive       Skin Circulation/Temperature WDL    Skin Circulation/Temperature WDL WDL       Cardiac WDL    Cardiac WDL WDL;chest pain  with cough       Chest Pain Assessment    Chest Pain Location anterior chest, right              "

## 2023-01-02 ENCOUNTER — VIRTUAL VISIT (OUTPATIENT)
Dept: FAMILY MEDICINE | Facility: CLINIC | Age: 58
End: 2023-01-02
Payer: COMMERCIAL

## 2023-01-02 DIAGNOSIS — J44.9 COPD, SEVERE (H): Primary | ICD-10-CM

## 2023-01-02 DIAGNOSIS — F17.200 TOBACCO USE DISORDER: ICD-10-CM

## 2023-01-02 PROCEDURE — 99213 OFFICE O/P EST LOW 20 MIN: CPT | Mod: TEL | Performed by: FAMILY MEDICINE

## 2023-01-02 RX ORDER — VARENICLINE TARTRATE 1 MG/1
1 TABLET, FILM COATED ORAL 2 TIMES DAILY
Qty: 56 TABLET | Refills: 4 | Status: SHIPPED | OUTPATIENT
Start: 2023-01-30 | End: 2023-09-21

## 2023-01-02 RX ORDER — FLUTICASONE FUROATE, UMECLIDINIUM BROMIDE AND VILANTEROL TRIFENATATE 200; 62.5; 25 UG/1; UG/1; UG/1
1 POWDER RESPIRATORY (INHALATION) DAILY
Qty: 30 EACH | Refills: 1 | Status: SHIPPED | OUTPATIENT
Start: 2023-01-02 | End: 2023-04-03

## 2023-01-02 NOTE — PROGRESS NOTES
Velma is a 57 year old who is being evaluated via a billable telephone visit.      What phone number would you like to be contacted at? [phone]  How would you like to obtain your AVS? Devyn    Distant Location (provider location):  On-site    Assessment & Plan     ASSESSMENT/ORDERS:    ICD-10-CM    1. COPD, severe (H)  J44.9 Fluticasone-Umeclidin-Vilanterol (TRELEGY ELLIPTA) 200-62.5-25 MCG/ACT oral inhaler     Adult Pulmonary Medicine Referral      2. Tobacco use disorder  F17.200 varenicline (CHANTIX TRENTON) 0.5 MG X 11 & 1 MG X 42 tablet     varenicline (CHANTIX CONTINUING MONTH TRENTON) 1 MG tablet        PLAN:  1.  Trial of Trelegy Ellipta for severe COPD.  Referral to pulmonology in event that this is not effective to discuss alternative treatment options.  Follow-up with me in 1 month if Trelegy Ellipta effective; follow-up with me 1 month after pulmonology workup complete to discuss if I can continue her recommended treatment or if continued pulmonology involvement is better.  2.  Chantix for smoking cessation.                  No follow-ups on file.    Gui Marques MD  [facility]   Velma is a 57 year old, presenting for the following health issues:  Smoking Cessation and Recheck Medication      History of Present Illness       Reason for visit:  Smoking Cessation, Med Check    She eats 0-1 servings of fruits and vegetables daily.She consumes 0 sweetened beverage(s) daily.She exercises with enough effort to increase her heart rate 9 or less minutes per day.  She exercises with enough effort to increase her heart rate 3 or less days per week.   She is taking medications regularly.           Has been to ER twice in last 6 weeks for COPD exacerbations.  Not really better after oral prednisone and antibiotics.  Is taking Anoro Ellipta for severe COPD, thinks it was helpful prior to worsening symptoms.  Has productive cough.      Wants to quit smoking.  Tried  Chantix in past, no side effects and wants to start this again.    Review of Systems         Objective           Vitals:  No vitals were obtained today due to virtual visit.    Physical Exam   healthy, alert and no distress  PSYCH: Alert and oriented times 3; coherent speech, normal   rate and volume, able to articulate logical thoughts, able   to abstract reason, no tangential thoughts, no hallucinations   or delusions  Her affect is normal  RESP: some cough during conversation, no audible wheezing, able to talk in full sentences  Remainder of exam unable to be completed due to telephone visits                Phone call duration: 13 minutes

## 2023-02-03 ENCOUNTER — OFFICE VISIT (OUTPATIENT)
Dept: PULMONOLOGY | Facility: CLINIC | Age: 58
End: 2023-02-03
Payer: COMMERCIAL

## 2023-02-03 VITALS
WEIGHT: 209 LBS | DIASTOLIC BLOOD PRESSURE: 72 MMHG | OXYGEN SATURATION: 97 % | HEIGHT: 67 IN | SYSTOLIC BLOOD PRESSURE: 124 MMHG | BODY MASS INDEX: 32.8 KG/M2

## 2023-02-03 DIAGNOSIS — J44.9 COPD, SEVERE (H): ICD-10-CM

## 2023-02-03 DIAGNOSIS — J44.1 COPD EXACERBATION (H): Primary | ICD-10-CM

## 2023-02-03 LAB
FEF 25/75: NORMAL
FEV-1: NORMAL
FEV1/FVC: NORMAL
FVC: NORMAL

## 2023-02-03 PROCEDURE — 94010 BREATHING CAPACITY TEST: CPT | Performed by: INTERNAL MEDICINE

## 2023-02-03 PROCEDURE — 99204 OFFICE O/P NEW MOD 45 MIN: CPT | Mod: 25 | Performed by: INTERNAL MEDICINE

## 2023-02-03 RX ORDER — LEVOFLOXACIN 500 MG/1
500 TABLET, FILM COATED ORAL DAILY
Qty: 7 TABLET | Refills: 0 | Status: SHIPPED | OUTPATIENT
Start: 2023-02-03 | End: 2023-02-10

## 2023-02-03 NOTE — PROGRESS NOTES
57-year-old female who I am asked to see by Dr. Marques for evaluation of frequent COPD exacerbations.    Patient had 2 ER visits in late November and middle of December for severe cough, purulent sputum production, wheezing, chest tightness and shortness of breath.  Both visit diagnoses were COPD exacerbations.  She received course of prednisone and antibiotics with each 1.  The first 1 was azithromycin and she says she felt better for about 2 days and then had recurrence of the cough and sputum.  The second 1 she received Omnicef as well as a prednisone burst and did not think that did anything at all.  She showed me a sample of her sputum and was a large amount of green-yellow sputum.  She says she coughs that amount up every day.  Often in the morning.  Sometimes at night.  Difficulty sleeping due to coughing and shortness of breath.    She was told of a COPD diagnosis related to cigarette smoking about 3 years ago.  Has been on an Anoro Ellipta for most of that time and then more recently after this ER visit was increased to Trelegy.  She also takes as needed albuterol 2-3 times per day with some benefit.  Sometimes takes cough suppressants with slight effect.  Despite ER visits and review of the medical chart shows frequent clinic visits for bronchitis she is not been hospitalized for pneumonia or respiratory failure.    She has not had any COVID vaccinations.  She did say she had a home positive COVID test about 8 months ago received an oral 5 days of  medication at home but recovered from that completely.    Started smoking when she was age 19 generally about 3/4 pack/day.  Longest quit time was 1 month.  She has tried nicotine gum in the past.  She now has a recent Chantix prescription which she is taken a few pills and is wondering whether it is causing migraine headaches.  She is noncommittal when asked about her desire to stop smoking.    Family history.  Mother and brother both were smokers and both  have lung cancer.    Social history.  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  There is some fumes in the air but she is able to do the physical activity full-time.  No other exposure to toxic inhalants.  She lives at home in a clean household with her  who also has a cigarette smoker.    Weight is stable.  Does hear wheezing especially at night.  Not frequent sinus symptoms but frequent runny nose.  No dysphagia.  Voice not hoarse.  She does have chronic lower extremity edema related to varicose veins for which she wears compression stockings.    Past Medical History:   Diagnosis Date     Phlebitis and thrombophlebitis 10/12/2012     PONV (postoperative nausea and vomiting)      Current Outpatient Medications   Medication Sig Dispense Refill     albuterol (PROAIR HFA) 108 (90 Base) MCG/ACT inhaler Inhale 2 puffs into the lungs every 4 hours as needed for shortness of breath / dyspnea 18 g 6     COMPRESSION STOCKINGS 1 each daily Wear daily size large, two pair 2 each 0     fluticasone (FLONASE) 50 MCG/ACT nasal spray Spray 2 sprays into both nostrils daily 16 g 11     Fluticasone-Umeclidin-Vilanterol (TRELEGY ELLIPTA) 200-62.5-25 MCG/ACT oral inhaler Inhale 1 puff into the lungs daily 30 each 1     ibuprofen (ADVIL/MOTRIN) 200 MG tablet Take 800 mg by mouth every 8 hours as needed for pain       levofloxacin (LEVAQUIN) 500 MG tablet Take 1 tablet (500 mg) by mouth daily for 7 days 7 tablet 0     spacer (OPTICHAMBER JOHNNY) holding chamber Use as needed with albuterol inhaler. 1 each 3     varenicline (CHANTIX CONTINUING MONTH [place]) 1 MG tablet Take 1 tablet (1 mg) by mouth 2 times daily 56 tablet 4     VITAMIN D, CHOLECALCIFEROL, PO Take 3,000 Units by mouth daily       guaiFENesin (MUCINEX) 600 MG 12 hr tablet Take 1,200 mg by mouth 2 times daily (Patient not taking: Reported on 2/3/2023)       ipratropium - albuterol 0.5 mg/2.5 mg/3 mL (DUONEB) 0.5-2.5 (3) MG/3ML neb solution Take 1 vial  (3 mLs) by nebulization every 4 hours as needed for shortness of breath, wheezing or cough (Patient not taking: Reported on 2/3/2023) 90 mL 0     predniSONE (DELTASONE) 10 MG tablet 50 mg every day x 2 days, then 40 mg every day x 2 days, then 30 mg every day x 2 days, then 20 mg every day x 2 days, then 10 mg every day x 2 days, (Patient not taking: Reported on 2/3/2023) 30 tablet 0     varenicline (CHANTIX [place]) 0.5 MG X 11 & 1 MG X 42 tablet Take 0.5 mg tab daily for 3 days, THEN 0.5 mg tab twice daily for 4 days, THEN 1 mg twice daily. (Patient not taking: Reported on 2/3/2023) 53 tablet 0     Family History   Problem Relation Age of Onset     Cancer Mother          - lung cancer with mets.     Respiratory Father         asthma     Cancer Maternal Grandmother         breast- 48 fatal age 51     Diabetes Maternal Uncle      Hypertension Brother      Diabetes Brother      Social History     Socioeconomic History     Marital status:      Spouse name: Ta     Number of children: 2     Years of education: 12     Highest education level: Not on file   Occupational History     Occupation: homemaker   Tobacco Use     Smoking status: Every Day     Packs/day: 0.75     Years: 36.00     Pack years: 27.00     Types: Cigarettes     Smokeless tobacco: Never   Substance and Sexual Activity     Alcohol use: Yes     Comment: rarely     Drug use: No     Sexual activity: Yes     Partners: Male     Birth control/protection: Surgical   Other Topics Concern     Parent/sibling w/ CABG, MI or angioplasty before 65F 55M? Not Asked   Social History Narrative     Not on file     Social Determinants of Health     Financial Resource Strain: Not on file   Food Insecurity: Not on file   Transportation Needs: Not on file   Physical Activity: Not on file   Stress: Not on file   Social Connections: Not on file   Intimate Partner Violence: Not on file   Housing Stability: Not on file     Constitutional: NEGATIVE, fatigue, fever  "and weight loss  Eyes: NEGATIVE for vision changes or irritation and redness.  ENT/Mouth: NEGATIVE for hoarseness and sore throat  CV: NEGATIVE for chest pain, palpitations or chest pressure, lower extremity edema and syncope or near-syncope  Respiratory:  Positive for t cough and SOB, No emoptysis, excessive sleepiness  GI: NEGATIVE for nausea, abdominal pain, heartburn, or change in bowel habits  Musculoskeletal: no arthralgias or joint swelling  Integumentary/Skin: NEGATIVE for rash  Neurological:  NEGATIVE for numbness or tingling and focal weakness  Hemotologic/Lymphatic: NEGATIVE for bleeding disorder and swollen nodes  Allergic/Immunologic: chronic rhinitis w/o hives  RESPIRATORY EXAM:  /72   Ht 1.702 m (5' 7\")   Wt 94.8 kg (209 lb)   LMP 03/14/2017 (Exact Date)   SpO2 97%   BMI 32.73 kg/m    Patient is well-nourished and well-appearing  but frequent congested cough.   Moves easily to exam table without dyspnea, voice quality normal  Nares have no obstruction or d/c and normal mucosa.  No maverick-pharyngeal lesions.    No neck masses or thyromegaly or jugular venous distention   Symmetrical chest, normal configuration, without accessory muscle use or tenderness on palpation. Clear breath sounds. No stridor.   Normal S1 and S2 heart sounds without murmur rub or gallop. LE in compression stockings, n edema   No abdominal distension  No neck or supraclavicular adenopathy.   No muscle atrophy. 5/5 lower limb strength bilaterally.  No tremor.  No digit clubbing.  No skin rash.   Affect is normal; cognition is normal.     Pulmonary function test obtained today and reviewed by me showed good effort.  Vital capacity 3.15 L, 83% of predicted.  FEV1 1.27 L, 43% of predicted with a ratio 40%.  This is severe airflow obstruction.  Compared to her only prior pulmonary function test done in June 2019 there is been a significant decline in the FEV1 from 1.9-2 1.27.  Also in 2019 her diffusion capacity was reduced at " 49% of predicted and she had evidence of air trapping with residual volume 188% of predicted.  She had a CT scan during the December emergency room visit.  No pulmonary embolism.  Moderate bilateral mostly upper lobe non-bullous emphysema.  No bronchial wall thickening or bronchiectasis.  No central airway obstruction.  No lymphadenopathy.  No consolidation or pleural effusion or fibrosis.  Serum bicarbonate is 27 suggesting the absence of chronic hypercapnia.    Assessment: Long-term cigarette use causing severe COPD by FEV1 criteria although she continues to work full-time at a physical job.  Spent most of the visit discussing the expected negative health consequences of the next 5 years if there is ongoing cigarette use including repeated hospitalizations, oxygen need and eventually death from respiratory failure.  She is already on triple inhaler therapy plus as needed medication so there is nothing to add there.  My biggest concern is whether she has drug-resistant organisms which is not allowing sustained response to the oral antibiotics she has been given.  I written for Gram stain and microbiology culture and given her to sterile specimen cups for which she should return to the microbiology laboratory when she is done that I given a prescription for levofloxacin 500 mg once daily x7 days.  She will eventually need repeat lung cancer screening CT scan but not for another year.  No need for supplemental oxygen.  I do not think she needs another course of prednisone.  I encouraged her to commit to cigarette smoking and restart that Shan tax medication which will help with cravings.  She should encourage her  to help her stop smoking also. COVID vaccinations are recommended.    She will be seen again in May at [facility] time we'll do alpha 1 testing. Ziyad Leroy MD, MPH  Associate Professor of Medicine    8 minutes prep prior to visit, 40 minutes face to face and 10 minutes post visit total 58

## 2023-02-04 ENCOUNTER — LAB (OUTPATIENT)
Dept: LAB | Facility: CLINIC | Age: 58
End: 2023-02-04
Payer: COMMERCIAL

## 2023-02-04 DIAGNOSIS — J44.1 COPD EXACERBATION (H): ICD-10-CM

## 2023-02-04 LAB
BACTERIA SPT CULT: NORMAL
GRAM STAIN RESULT: NORMAL

## 2023-02-04 PROCEDURE — 87205 SMEAR GRAM STAIN: CPT

## 2023-04-02 DIAGNOSIS — J44.9 COPD, SEVERE (H): ICD-10-CM

## 2023-04-03 RX ORDER — FLUTICASONE FUROATE, UMECLIDINIUM BROMIDE AND VILANTEROL TRIFENATATE 200; 62.5; 25 UG/1; UG/1; UG/1
1 POWDER RESPIRATORY (INHALATION) DAILY
Qty: 60 EACH | Refills: 1 | Status: SHIPPED | OUTPATIENT
Start: 2023-04-03 | End: 2023-07-05

## 2023-06-19 ENCOUNTER — PATIENT OUTREACH (OUTPATIENT)
Dept: CARE COORDINATION | Facility: CLINIC | Age: 58
End: 2023-06-19
Payer: COMMERCIAL

## 2023-06-27 ENCOUNTER — TELEPHONE (OUTPATIENT)
Dept: FAMILY MEDICINE | Facility: OTHER | Age: 58
End: 2023-06-27
Payer: COMMERCIAL

## 2023-06-27 NOTE — TELEPHONE ENCOUNTER
Pt was contacted due to preventative visit being scheduled too early. Pt stated that she will keep the appointment for now and contact insurance. Pt will adjust the appointment if needed.

## 2023-07-04 ASSESSMENT — ENCOUNTER SYMPTOMS
CONSTIPATION: 1
DIARRHEA: 0
SORE THROAT: 0
HEMATOCHEZIA: 0
MYALGIAS: 1
COUGH: 0
SHORTNESS OF BREATH: 1
NAUSEA: 0
EYE PAIN: 0
BREAST MASS: 0
PARESTHESIAS: 0
FEVER: 0
ARTHRALGIAS: 0
FREQUENCY: 0
HEARTBURN: 0
CHILLS: 0
WEAKNESS: 0
PALPITATIONS: 0
DYSURIA: 0
NERVOUS/ANXIOUS: 0
DIZZINESS: 0
HEMATURIA: 0
ABDOMINAL PAIN: 0
HEADACHES: 0
JOINT SWELLING: 0

## 2023-07-05 ENCOUNTER — OFFICE VISIT (OUTPATIENT)
Dept: FAMILY MEDICINE | Facility: OTHER | Age: 58
End: 2023-07-05
Payer: COMMERCIAL

## 2023-07-05 VITALS
RESPIRATION RATE: 16 BRPM | DIASTOLIC BLOOD PRESSURE: 80 MMHG | OXYGEN SATURATION: 91 % | HEART RATE: 71 BPM | HEIGHT: 66 IN | TEMPERATURE: 98.1 F | WEIGHT: 218 LBS | BODY MASS INDEX: 35.03 KG/M2 | SYSTOLIC BLOOD PRESSURE: 122 MMHG

## 2023-07-05 DIAGNOSIS — Z12.31 VISIT FOR SCREENING MAMMOGRAM: ICD-10-CM

## 2023-07-05 DIAGNOSIS — E66.812 CLASS 2 OBESITY WITHOUT SERIOUS COMORBIDITY WITH BODY MASS INDEX (BMI) OF 35.0 TO 35.9 IN ADULT, UNSPECIFIED OBESITY TYPE: ICD-10-CM

## 2023-07-05 DIAGNOSIS — Z00.00 ROUTINE GENERAL MEDICAL EXAMINATION AT A HEALTH CARE FACILITY: Primary | ICD-10-CM

## 2023-07-05 DIAGNOSIS — Z13.220 SCREENING FOR HYPERLIPIDEMIA: ICD-10-CM

## 2023-07-05 DIAGNOSIS — M54.50 ACUTE BILATERAL LOW BACK PAIN WITHOUT SCIATICA: ICD-10-CM

## 2023-07-05 DIAGNOSIS — D22.9 MULTIPLE NEVI: ICD-10-CM

## 2023-07-05 DIAGNOSIS — F17.200 TOBACCO USE DISORDER: ICD-10-CM

## 2023-07-05 DIAGNOSIS — Z13.1 SCREENING FOR DIABETES MELLITUS: ICD-10-CM

## 2023-07-05 DIAGNOSIS — J44.9 COPD, SEVERE (H): ICD-10-CM

## 2023-07-05 LAB
CHOLEST SERPL-MCNC: 221 MG/DL
FASTING STATUS PATIENT QL REPORTED: YES
GLUCOSE SERPL-MCNC: 95 MG/DL (ref 70–99)
HDLC SERPL-MCNC: 71 MG/DL
LDLC SERPL CALC-MCNC: 122 MG/DL
NONHDLC SERPL-MCNC: 150 MG/DL
TRIGL SERPL-MCNC: 139 MG/DL

## 2023-07-05 PROCEDURE — 99396 PREV VISIT EST AGE 40-64: CPT | Performed by: PHYSICIAN ASSISTANT

## 2023-07-05 PROCEDURE — 93000 ELECTROCARDIOGRAM COMPLETE: CPT | Performed by: PHYSICIAN ASSISTANT

## 2023-07-05 PROCEDURE — 36415 COLL VENOUS BLD VENIPUNCTURE: CPT | Performed by: PHYSICIAN ASSISTANT

## 2023-07-05 PROCEDURE — 99214 OFFICE O/P EST MOD 30 MIN: CPT | Mod: 25 | Performed by: PHYSICIAN ASSISTANT

## 2023-07-05 PROCEDURE — 82947 ASSAY GLUCOSE BLOOD QUANT: CPT | Performed by: PHYSICIAN ASSISTANT

## 2023-07-05 PROCEDURE — 80061 LIPID PANEL: CPT | Performed by: PHYSICIAN ASSISTANT

## 2023-07-05 RX ORDER — IPRATROPIUM BROMIDE AND ALBUTEROL SULFATE 2.5; .5 MG/3ML; MG/3ML
1 SOLUTION RESPIRATORY (INHALATION) EVERY 4 HOURS PRN
Qty: 90 ML | Refills: 0 | Status: SHIPPED | OUTPATIENT
Start: 2023-07-05 | End: 2024-06-28

## 2023-07-05 RX ORDER — BUPROPION HYDROCHLORIDE 150 MG/1
150 TABLET ORAL EVERY MORNING
Qty: 7 TABLET | Refills: 0 | Status: SHIPPED | OUTPATIENT
Start: 2023-07-05 | End: 2023-08-07

## 2023-07-05 RX ORDER — BUPROPION HYDROCHLORIDE 300 MG/1
300 TABLET ORAL EVERY MORNING
Qty: 90 TABLET | Refills: 1 | Status: SHIPPED | OUTPATIENT
Start: 2023-07-05 | End: 2023-11-10

## 2023-07-05 RX ORDER — FLUTICASONE FUROATE, UMECLIDINIUM BROMIDE AND VILANTEROL TRIFENATATE 200; 62.5; 25 UG/1; UG/1; UG/1
1 POWDER RESPIRATORY (INHALATION) DAILY
Qty: 60 EACH | Refills: 1 | Status: SHIPPED | OUTPATIENT
Start: 2023-07-05 | End: 2023-09-27

## 2023-07-05 ASSESSMENT — ENCOUNTER SYMPTOMS
NERVOUS/ANXIOUS: 0
EYE PAIN: 0
PARESTHESIAS: 0
SHORTNESS OF BREATH: 1
FREQUENCY: 0
HEMATOCHEZIA: 0
CONSTIPATION: 1
FEVER: 0
DIZZINESS: 0
ARTHRALGIAS: 0
ABDOMINAL PAIN: 0
MYALGIAS: 1
NAUSEA: 0
DYSURIA: 0
WEAKNESS: 0
CHILLS: 0
COUGH: 0
SORE THROAT: 0
PALPITATIONS: 0
JOINT SWELLING: 0
DIARRHEA: 0
HEARTBURN: 0
HEMATURIA: 0
BREAST MASS: 0
HEADACHES: 0

## 2023-07-05 ASSESSMENT — PAIN SCALES - GENERAL: PAINLEVEL: SEVERE PAIN (6)

## 2023-07-05 NOTE — PROGRESS NOTES
SUBJECTIVE:   CC: Velma is an 57 year old who presents for preventive health visit.       2023     9:50 AM   Additional Questions   Roomed by LENNY   Accompanied by FOUZIA         2023     9:50 AM   Patient Reported Additional Medications   Patient reports taking the following new medications omeprazole, daily     Healthy Habits:     Getting at least 3 servings of Calcium per day:  Yes    Bi-annual eye exam:  Yes    Dental care twice a year:  Yes    Sleep apnea or symptoms of sleep apnea:  None    Diet:  Regular (no restrictions)    Frequency of exercise:  2-3 days/week    Duration of exercise:  45-60 minutes    Taking medications regularly:  Yes    Medication side effects:  Not applicable    Additional concerns today:  Yes      Today's PHQ-2 Score:       2023    10:50 AM   PHQ-2 (  [employer])   Q1: Little interest or pleasure in doing things 0   Q2: Feeling down, depressed or hopeless 0   PHQ-2 Score 0   Q1: Little interest or pleasure in doing things Not at all   Q2: Feeling down, depressed or hopeless Not at all   PHQ-2 Score 0                 Musculoskeletal problem/pain      Duration: few months    Description  Location: Bilateral legs and lower back    Intensity:  moderate, 6/10    Accompanying signs and symptoms: none    History  Previous similar problem: no   Previous evaluation:  none    Precipitating or alleviating factors:  Trauma or overuse: no   Aggravating factors include: standing, walking and bending over    Therapies tried and outcome: Ibuprofen        Social History     Tobacco Use     Smoking status: Former     Packs/day: 1.00     Years: 37.00     Pack years: 37.00     Types: Cigarettes     Start date: 1985     Quit date: 2023     Years since quittin.3     Passive exposure: Never     Smokeless tobacco: Never     Tobacco comments:     already quit   Substance Use Topics     Alcohol use: Not Currently     Comment: rarely             2023    10:49 AM   Alcohol Use    Prescreen: >3 drinks/day or >7 drinks/week? No     Reviewed orders with patient.  Reviewed health maintenance and updated orders accordingly - Yes  BP Readings from Last 3 Encounters:   23 122/80   23 124/72   22 (!) 144/83    Wt Readings from Last 3 Encounters:   23 98.9 kg (218 lb)   23 94.8 kg (209 lb)   22 93.4 kg (206 lb)                  Patient Active Problem List   Diagnosis     Tobacco use disorder     Varicose veins of legs     Obesity, Class I, BMI 30-34.9     Peripheral edema     COPD, severe (H)     Past Surgical History:   Procedure Laterality Date     COLONOSCOPY N/A 2018    Procedure: COLONOSCOPY;  COLONOSCOPY;  Surgeon: Paresh Curran MD;  Location: [facility]     HC TREATMENT INCOMPLETE  SURG, ANY TRIMESTER      D & C  s/p miscarriage     LAPAROSCOPIC CHOLECYSTECTOMY N/A 2019    Procedure: LAPAROSCOPIC CHOLECYSTECTOMY;  Surgeon: Rob Gonzales DO;  Location: [facility] OR     ZZC NONSPECIFIC PROCEDURE      Right hand surgery- tendon repair       Social History     Tobacco Use     Smoking status: Former     Packs/day: 1.00     Years: 37.00     Pack years: 37.00     Types: Cigarettes     Start date: 1985     Quit date: 2023     Years since quittin.3     Passive exposure: Never     Smokeless tobacco: Never     Tobacco comments:     already quit   Substance Use Topics     Alcohol use: Not Currently     Comment: rarely     Family History   Problem Relation Age of Onset     Cancer Mother          - lung cancer with mets.     Respiratory Father         asthma     Lung Cancer Brother         Smoker     Hypertension Brother      Diabetes Brother      Cancer Maternal Grandmother         breast- 48 fatal age 51     Hypertension Son      Diabetes Maternal Uncle          Current Outpatient Medications   Medication Sig Dispense Refill     albuterol (PROAIR HFA) 108 (90 Base) MCG/ACT inhaler Inhale 2 puffs into the lungs every 4 hours  as needed for shortness of breath / dyspnea 18 g 6     buPROPion (WELLBUTRIN XL) 150 MG 24 hr tablet Take 1 tablet (150 mg) by mouth every morning For a week then increase to 300 mg dose. 7 tablet 0     buPROPion (WELLBUTRIN XL) 300 MG 24 hr tablet Take 1 tablet (300 mg) by mouth every morning 90 tablet 1     COMPRESSION STOCKINGS 1 each daily Wear daily size large, two pair 2 each 0     fluticasone (FLONASE) 50 MCG/ACT nasal spray Spray 2 sprays into both nostrils daily 16 g 11     Fluticasone-Umeclidin-Vilanterol (TRELEGY ELLIPTA) 200-62.5-25 MCG/ACT oral inhaler Inhale 1 puff into the lungs daily 60 each 1     guaiFENesin (MUCINEX) 600 MG 12 hr tablet Take 1,200 mg by mouth 2 times daily       ibuprofen (ADVIL/MOTRIN) 200 MG tablet Take 800 mg by mouth every 8 hours as needed for pain       ipratropium - albuterol 0.5 mg/2.5 mg/3 mL (DUONEB) 0.5-2.5 (3) MG/3ML neb solution Take 1 vial (3 mLs) by nebulization every 4 hours as needed for shortness of breath, wheezing or cough 90 mL 0     spacer (OPTICHAMBER JOHNNY) holding chamber Use as needed with albuterol inhaler. 1 each 3     varenicline (CHANTIX CONTINUING MONTH [place]) 1 MG tablet Take 1 tablet (1 mg) by mouth 2 times daily 56 tablet 4     VITAMIN D, CHOLECALCIFEROL, PO Take 3,000 Units by mouth daily       No Known Allergies    Breast Cancer Screening:    FHS-7:       4/11/2022     4:18 PM 7/18/2022     7:32 AM 7/4/2023    10:51 AM   Breast CA Risk Assessment (FHS-7)   Did any of your first-degree relatives have breast or ovarian cancer? No Yes Yes   Did any of your relatives have bilateral breast cancer? No No Unknown   Did any man in your family have breast cancer? No No No   Did any woman in your family have breast and ovarian cancer? No No Yes   Did any woman in your family have breast cancer before age 50 y? Yes Yes Yes   Do you have 2 or more relatives with breast and/or ovarian cancer? No No No   Do you have 2 or more relatives with breast and/or bowel  cancer? No No No       Mammogram Screening: Recommended mammography every 1-2 years with patient discussion and risk factor consideration  Pertinent mammograms are reviewed under the imaging tab.    History of abnormal Pap smear: NO - age 30-65 PAP every 5 years with negative HPV co-testing recommended      Latest Ref Rng & Units 7/18/2022     8:16 AM 11/29/2017     5:16 PM 10/3/2012     5:00 PM   PAP / HPV   PAP  Negative for Intraepithelial Lesion or Malignancy (NILM)      PAP (Historical)   NIL  NIL    HPV 16 DNA Negative Negative  Negative     HPV 18 DNA Negative Negative  Negative     Other HR HPV Negative Negative  Negative       Reviewed and updated as needed this visit by clinical staff   Tobacco  Allergies  Meds  Problems  Med Hx  Surg Hx  Fam Hx          Reviewed and updated as needed this visit by Provider   Tobacco  Allergies  Meds  Problems  Med Hx  Surg Hx  Fam Hx             Review of Systems   Constitutional: Negative for chills and fever.   HENT: Negative for congestion, ear pain, hearing loss and sore throat.    Eyes: Negative for pain and visual disturbance.   Respiratory: Positive for shortness of breath. Negative for cough.    Cardiovascular: Positive for peripheral edema. Negative for chest pain and palpitations.   Gastrointestinal: Positive for constipation. Negative for abdominal pain, diarrhea, heartburn, hematochezia and nausea.   Breasts:  Negative for tenderness, breast mass and discharge.   Genitourinary: Negative for dysuria, frequency, genital sores, hematuria, pelvic pain, urgency, vaginal bleeding and vaginal discharge.   Musculoskeletal: Positive for myalgias. Negative for arthralgias and joint swelling.   Skin: Negative for rash.   Neurological: Negative for dizziness, weakness, headaches and paresthesias.   Psychiatric/Behavioral: Negative for mood changes. The patient is not nervous/anxious.           OBJECTIVE:   /80   Pulse 71   Temp 98.1  F (36.7  C)  "(Temporal)   Resp 16   Ht 1.671 m (5' 5.79\")   Wt 98.9 kg (218 lb)   LMP 03/14/2017 (Exact Date)   SpO2 91%   BMI 35.41 kg/m    Physical Exam  GENERAL: healthy, alert and no distress  EYES: Eyes grossly normal to inspection, PERRL and conjunctivae and sclerae normal  HENT: ear canals and TM's normal, nose and mouth without ulcers or lesions  NECK: no adenopathy, no asymmetry, masses, or scars and thyroid normal to palpation  RESP: lungs clear to auscultation - no rales, rhonchi or wheezes  BREAST: normal without masses, tenderness or nipple discharge and no palpable axillary masses or adenopathy  CV: regular rate and rhythm, normal S1 S2, no S3 or S4, no murmur, click or rub, no peripheral edema and peripheral pulses strong  ABDOMEN: soft, nontender, no hepatosplenomegaly, no masses and bowel sounds normal  MS: no gross musculoskeletal defects noted, no edema.  Non-tender to palpation over the lumbar spinous processes and the bilateral lumbar paraspinal muscles.  Mildly tender to palpation over the bilateral SI joints, non-tender over the gluteal muscles. Full active ROM of the lumbar spine in all directions.   SKIN: no suspicious lesions or rashes  NEURO: Normal strength and tone, mentation intact and speech normal  PSYCH: mentation appears normal, affect normal/bright    Diagnostic Test Results:  Labs reviewed in Epic  No results found for this or any previous visit (from the past 24 hour(s)).    ASSESSMENT/PLAN:       ICD-10-CM    1. Routine general medical examination at a health care facility  Z00.00       2. Visit for screening mammogram  Z12.31 MA SCREENING DIGITAL BILAT - Future  (s+30)      3. Screening for hyperlipidemia  Z13.220 Lipid panel reflex to direct LDL Fasting      4. Screening for diabetes mellitus  Z13.1 Glucose      5. COPD, severe (H)  J44.9 ipratropium - albuterol 0.5 mg/2.5 mg/3 mL (DUONEB) 0.5-2.5 (3) MG/3ML neb solution     Fluticasone-Umeclidin-Vilanterol (TRELEGY ELLIPTA) " 200-62.5-25 MCG/ACT oral inhaler      6. Tobacco use disorder  F17.200 buPROPion (WELLBUTRIN XL) 150 MG 24 hr tablet     buPROPion (WELLBUTRIN XL) 300 MG 24 hr tablet      7. Class 2 obesity without serious comorbidity with body mass index (BMI) of 35.0 to 35.9 in adult, unspecified obesity type  E66.9 EKG 12-lead complete w/read - Clinics    Z68.35 buPROPion (WELLBUTRIN XL) 150 MG 24 hr tablet     buPROPion (WELLBUTRIN XL) 300 MG 24 hr tablet     EKG 12-lead complete w/read - Clinics      8. Multiple nevi  D22.9 Adult Dermatology Referral      9. Acute bilateral low back pain without sciatica  M54.50 Physical Therapy Referral        COPD:  - She still notes some wheezing, none on exam today. Ok to continue with albuterol BID as she has been, continue on Trelegy.   - Refill meds as needed  - Due for follow-up with Pulmonology.     Tobacco use:  - Due for lung cancer screening end of the month.   - She is still tobacco free but struggling some with her weight gain which makes her want to smoke more.  Will add on Wellbutrin for her weight loss, see below, this may help additionally to her Chantix.  Also recommended she increase to full dose of Chantix at twice daily instead of just once daily which might help more.   - Can use OTC Mucinex as well if needed.     Multiple Nevi:  - Referral to Dermatology.     Back pain:  - Suspect muscle and SI joint related, no symptoms or findings to suggest herniated disc or nerve compression at this time. Recommended stretches to help relieve pain and referral to physical therapy.   - Follow-up if not improving.     Weight management:  - She notes insurance will cover 80% of the costs for weight loss medications.  She didn't seem interested in daily injection like Saxenda, would be ok with weekly injection like Wegovy but we discussed the current shortage and wouldn't likely be able to get medication to even start it or maintain on it.  Will monitor the supply and consider changing  "in the future.   - We discussed phentermine, topiramate, wellbutrin, naltrexone.  Elected to start on Wellbutrin as she is starting to get more cravings to smoke.  We discussed potential side effects and black box warning of Wellbutrin and Phentermine. EKG showed some premature beats but QTc interval otherwise within normal limits.  We can add on Phentermine if needed.   - Discussed changing diet, less processed foods and refined sugars to try and help with weight loss, also avoidance of large calorie deficits which could also impair her ability to lose weight or keep weight lost off.    - Will trial Wellbutrin for a month and then update via [employer] and if needed start on 15 mg of Phentermine with recheck in clinic face to face a month after starting.          COUNSELING:  Reviewed preventive health counseling, as reflected in patient instructions      BMI:   Estimated body mass index is 35.41 kg/m  as calculated from the following:    Height as of this encounter: 1.671 m (5' 5.79\").    Weight as of this encounter: 98.9 kg (218 lb).   Weight management plan: Discussed healthy diet and exercise guidelines      She reports that she quit smoking about 4 months ago. Her smoking use included cigarettes. She started smoking about 38 years ago. She has a 37.00 pack-year smoking history. She has never been exposed to tobacco smoke. She has never used smokeless tobacco.      Shahid Hong PA-C  [facility]  "

## 2023-07-11 ENCOUNTER — TELEPHONE (OUTPATIENT)
Dept: FAMILY MEDICINE | Facility: OTHER | Age: 58
End: 2023-07-11
Payer: COMMERCIAL

## 2023-07-11 NOTE — TELEPHONE ENCOUNTER
Yes  because it can cause constipation. She can take Miralax 17 grams daily and a stool softener if that helps. Stay hydrated.     Shahid Hong PA-C

## 2023-07-19 ENCOUNTER — HOSPITAL ENCOUNTER (OUTPATIENT)
Dept: MAMMOGRAPHY | Facility: CLINIC | Age: 58
Discharge: HOME | End: 2023-07-19
Attending: PHYSICIAN ASSISTANT | Admitting: PHYSICIAN ASSISTANT
Payer: COMMERCIAL

## 2023-07-19 DIAGNOSIS — Z12.31 VISIT FOR SCREENING MAMMOGRAM: ICD-10-CM

## 2023-07-19 PROCEDURE — 77067 SCR MAMMO BI INCL CAD: CPT

## 2023-07-23 ENCOUNTER — HOSPITAL ENCOUNTER (EMERGENCY)
Facility: CLINIC | Age: 58
Discharge: HOME | End: 2023-07-23
Attending: PHYSICIAN ASSISTANT | Admitting: PHYSICIAN ASSISTANT
Payer: COMMERCIAL

## 2023-07-23 ENCOUNTER — APPOINTMENT (OUTPATIENT)
Dept: ULTRASOUND IMAGING | Facility: CLINIC | Age: 58
End: 2023-07-23
Attending: PHYSICIAN ASSISTANT
Payer: COMMERCIAL

## 2023-07-23 VITALS
WEIGHT: 218 LBS | OXYGEN SATURATION: 97 % | SYSTOLIC BLOOD PRESSURE: 146 MMHG | HEIGHT: 65 IN | BODY MASS INDEX: 36.32 KG/M2 | DIASTOLIC BLOOD PRESSURE: 81 MMHG | RESPIRATION RATE: 18 BRPM | HEART RATE: 86 BPM | TEMPERATURE: 98 F

## 2023-07-23 DIAGNOSIS — I82.462 ACUTE DEEP VEIN THROMBOSIS (DVT) OF CALF MUSCLE VEIN OF LEFT LOWER EXTREMITY (H): ICD-10-CM

## 2023-07-23 PROCEDURE — 99284 EMERGENCY DEPT VISIT MOD MDM: CPT | Performed by: PHYSICIAN ASSISTANT

## 2023-07-23 PROCEDURE — 99284 EMERGENCY DEPT VISIT MOD MDM: CPT | Mod: 25 | Performed by: PHYSICIAN ASSISTANT

## 2023-07-23 PROCEDURE — 93971 EXTREMITY STUDY: CPT | Mod: LT

## 2023-07-23 ASSESSMENT — ACTIVITIES OF DAILY LIVING (ADL): ADLS_ACUITY_SCORE: 35

## 2023-07-23 NOTE — DISCHARGE INSTRUCTIONS
Unfortunately the ultrasound did show you have a deep vein blood clot in your left leg.  Please start the blood thinner Xarelto as prescribed to help treat this.  Do not take ibuprofen or other NSAIDs while on this because it can increase your risk of bleeding.  I would like you to call your clinic provider tomorrow morning and let them know you were here and schedule a follow-up visit to discuss further management of this issue.  If you do have any worsening symptoms as discussed please return to the emergency department.    Thank you for choosing [facility] Emergency Department. It was a pleasure taking care of you today. If you have any questions, please call [phone].    Jasmyn Villalba PA-C

## 2023-07-23 NOTE — ED PROVIDER NOTES
History     Chief Complaint   Patient presents with     Leg Pain       HPI  Velma Rizvi is a 57 year old female who presents to the emergency department complaining of left thigh pain.  About a week ago she noticed some swelling and redness to her mid inner left thigh.  Since then it has grown progressively worse in regard to pain and now there is some redness going up the leg a little bit.  She does have a history of varicose veins in this area.  Has had no fevers, body aches, chest pain or shortness of breath.  She has not taken anything for her symptoms.  No lower leg swelling or pain.  She is not on blood thinners.        Allergies:  No Known Allergies    Problem List:    Patient Active Problem List    Diagnosis Date Noted     COPD, severe (H) 2019     Priority: Medium     Peripheral edema 2018     Priority: Medium     Obesity, Class I, BMI 30-34.9 2017     Priority: Medium     Varicose veins of legs 2011     Priority: Medium     See abnormal US study.       Tobacco use disorder 2008     Priority: Medium        Past Medical History:    Past Medical History:   Diagnosis Date     Phlebitis and thrombophlebitis 10/12/2012     PONV (postoperative nausea and vomiting)        Past Surgical History:    Past Surgical History:   Procedure Laterality Date     COLONOSCOPY N/A 2018    Procedure: COLONOSCOPY;  COLONOSCOPY;  Surgeon: Paresh Curran MD;  Location:  GI     HC TREATMENT INCOMPLETE  SURG, ANY TRIMESTER      D & C  s/p miscarriage     LAPAROSCOPIC CHOLECYSTECTOMY N/A 2019    Procedure: LAPAROSCOPIC CHOLECYSTECTOMY;  Surgeon: Rob Gonzales DO;  Location:  OR     [facility] NONSPECIFIC PROCEDURE      Right hand surgery- tendon repair       Family History:    Family History   Problem Relation Age of Onset     Cancer Mother          - lung cancer with mets.     Respiratory Father         asthma     Lung Cancer Brother         Smoker      "Hypertension Brother      Diabetes Brother      Cancer Maternal Grandmother         breast- 48 fatal age 51     Hypertension Son      Diabetes Maternal Uncle        Social History:  Marital Status:   [2]  Social History     Tobacco Use     Smoking status: Former     Packs/day: 1.00     Years: 37.00     Pack years: 37.00     Types: Cigarettes     Start date: 1985     Quit date: 2023     Years since quittin.4     Passive exposure: Never     Smokeless tobacco: Never     Tobacco comments:     already quit   Vaping Use     Vaping Use: Never used   Substance Use Topics     Alcohol use: Not Currently     Comment: rarely     Drug use: No        Medications:    Rivaroxaban ANTICOAGULANT 15 & 20 MG TBPK Starter Therapy Pack  albuterol (PROAIR HFA) 108 (90 Base) MCG/ACT inhaler  buPROPion (WELLBUTRIN XL) 150 MG 24 hr tablet  buPROPion (WELLBUTRIN XL) 300 MG 24 hr tablet  COMPRESSION STOCKINGS  fluticasone (FLONASE) 50 MCG/ACT nasal spray  Fluticasone-Umeclidin-Vilanterol (TRELEGY ELLIPTA) 200-62.5-25 MCG/ACT oral inhaler  guaiFENesin (MUCINEX) 600 MG 12 hr tablet  ibuprofen (ADVIL/MOTRIN) 200 MG tablet  ipratropium - albuterol 0.5 mg/2.5 mg/3 mL (DUONEB) 0.5-2.5 (3) MG/3ML neb solution  spacer (OPTICHAMBER JOHNNY) holding chamber  varenicline (CHANTIX CONTINUING MONTH [place]) 1 MG tablet  VITAMIN D, CHOLECALCIFEROL, PO          Review of Systems   All other systems reviewed and are negative.         Physical Exam   BP: (!) 142/87  Pulse: 98  Temp: 98  F (36.7  C)  Resp: 18  Height: 165.1 cm (5' 5\")  Weight: 98.9 kg (218 lb)  SpO2: 99 %      Physical Exam  Vitals and nursing note reviewed.   Constitutional:       General: She is not in acute distress.     Appearance: She is obese. She is not ill-appearing, toxic-appearing or diaphoretic.   HENT:      Head: Normocephalic and atraumatic.   Eyes:      Conjunctiva/sclera: Conjunctivae normal.      Pupils: Pupils are equal, round, and reactive to light. "   Pulmonary:      Effort: Pulmonary effort is normal. No respiratory distress.   Musculoskeletal:      Cervical back: Neck supple.      Comments: Left leg: 5 x 4 cm of erythema and warmth to the mid medial thigh with thick tender ropey vein underlying.  Trace erythema extending proximally noted.  Bilateral lower legs with varicose veins noted.  No significant swelling and no tenderness to lower legs.   Skin:     General: Skin is warm and dry.   Neurological:      General: No focal deficit present.      Mental Status: She is alert and oriented to person, place, and time. Mental status is at baseline.   Psychiatric:         Mood and Affect: Mood normal.         Behavior: Behavior normal.            ED Course           Procedures      Results for orders placed or performed during the hospital encounter of 07/23/23 (from the past 24 hour(s))   US Lower Extremity Venous Duplex Left    Narrative    EXAM: ULTRASOUND LOWER EXTREMITY VENOUS DUPLEX LEFT  LOCATION: [facility]  DATE: 07/23/2023    INDICATION: Mid thigh swelling, pain, history of varicose veins.  COMPARISON: Venous ultrasound on 10/10/2012  TECHNIQUE: Venous Duplex ultrasound of the left lower extremity with and without compression, augmentation and duplex. Color flow and spectral Doppler with waveform analysis performed.    FINDINGS: Exam includes the common femoral, femoral, popliteal, and contralateral common femoral veins as well as segmentally visualized deep calf veins and greater saphenous vein.     LEFT: The left gastrocnemius vein is partially compressible in the proximal calf. The great saphenous vein is partially compressible in the proximal calf approximately 1.2 cm from the femoral saphenous junction and noncompressible in the mid thigh area.   No popliteal cyst.      Impression    IMPRESSION:  1.  Nonocclusive deep venous thrombosis of the left gastrocnemius vein in the proximal calf area.  2.  Occlusive and  nonocclusive superficial thrombophlebitis of the left great saphenous vein in the proximal and mid thigh area.         Medications - No data to display       Assessments & Plan (with Medical Decision Making)  Velma Rizvi is a 57 year old female who presented to the ED with a week history of left medial thigh redness and pain.  History of varicose veins.  Denies any associated fevers, body aches, chest pain or shortness of breath.  She was hypertensive on arrival but otherwise had normal vital signs.  Exam reveals an area of erythema and warmth with underlying ropey tender band suggestive of a thrombophlebitis.  Given history of varicosities she does have increased risk for blood clots and it was decided we would get an ultrasound to confirm that this is simply a thrombophlebitis and there was no concurrent DVT.  Ultrasound today did show occlusive and nonocclusive superficial thrombophlebitis of the left great saphenous vein in the proximal and mid thigh area but also unfortunately she did have a nonocclusive DVT in the left gastrocnemius of the proximal calf area.  I went over these findings with the patient.  I recommended anticoagulation given findings of DVT.  I went over risks and benefits of warfarin versus NOAC and after long discussion she decided to go with a NOAC so Xarelto will be prescribed.  I advised that she contact her clinic provider tomorrow morning to schedule follow-up to discuss this new diagnosis and further management.  I went over warning signs and symptoms of when she should return to the ED.  All questions answered and patient discharged home in suitable condition.     I have reviewed the nursing notes.    I have reviewed the findings, diagnosis, plan and need for follow up with the patient.      Discharge Medication List as of 7/23/2023  2:18 PM      START taking these medications    Details   Rivaroxaban ANTICOAGULANT 15 & 20 MG TBPK Starter Therapy Pack Take 15 mg by mouth 2 times  daily (with meals) for 21 days, THEN 20 mg daily with food for 9 days., Disp-51 each, R-0, E-Prescribe             Final diagnoses:   Acute deep vein thrombosis (DVT) of calf muscle vein of left lower extremity (H)     Note: Chart documentation done in part with Dragon Voice Recognition software. Although reviewed after completion, some word and grammatical errors may remain.     7/23/2023   [facility] EMERGENCY DEPT     Jasmyn Villalba PA-C  07/23/23 1229

## 2023-07-23 NOTE — ED TRIAGE NOTES
L inner upper leg pain, redness, swelling noted since Monday.      Triage Assessment       Row Name 07/23/23 0142       Triage Assessment (Adult)    Airway WDL WDL       Respiratory WDL    Respiratory WDL WDL       Cardiac WDL    Cardiac WDL WDL

## 2023-08-07 ENCOUNTER — OFFICE VISIT (OUTPATIENT)
Dept: FAMILY MEDICINE | Facility: OTHER | Age: 58
End: 2023-08-07
Payer: COMMERCIAL

## 2023-08-07 VITALS
HEART RATE: 84 BPM | HEIGHT: 65 IN | SYSTOLIC BLOOD PRESSURE: 128 MMHG | DIASTOLIC BLOOD PRESSURE: 86 MMHG | TEMPERATURE: 99 F | OXYGEN SATURATION: 94 % | RESPIRATION RATE: 18 BRPM | BODY MASS INDEX: 36.24 KG/M2 | WEIGHT: 217.5 LBS

## 2023-08-07 DIAGNOSIS — I82.4Y2 ACUTE DEEP VEIN THROMBOSIS (DVT) OF PROXIMAL VEIN OF LEFT LOWER EXTREMITY (H): ICD-10-CM

## 2023-08-07 DIAGNOSIS — I83.893 VARICOSE VEINS OF BILATERAL LOWER EXTREMITIES WITH OTHER COMPLICATIONS: ICD-10-CM

## 2023-08-07 DIAGNOSIS — E66.812 CLASS 2 OBESITY WITHOUT SERIOUS COMORBIDITY WITH BODY MASS INDEX (BMI) OF 35.0 TO 35.9 IN ADULT, UNSPECIFIED OBESITY TYPE: Primary | ICD-10-CM

## 2023-08-07 PROCEDURE — 99214 OFFICE O/P EST MOD 30 MIN: CPT | Performed by: PHYSICIAN ASSISTANT

## 2023-08-07 RX ORDER — PHENTERMINE HYDROCHLORIDE 15 MG/1
15 CAPSULE ORAL EVERY MORNING
Qty: 30 CAPSULE | Refills: 0 | Status: SHIPPED | OUTPATIENT
Start: 2023-08-07 | End: 2023-11-03

## 2023-08-07 NOTE — PROGRESS NOTES
Assessment & Plan     Class 2 obesity without serious comorbidity with body mass index (BMI) of 35.0 to 35.9 in adult, unspecified obesity type  Will continue on Wellbutrin for now.   Ok to start on Phentermine. She did EKG, BP stable.   Discussed potential side effects of medication and how it works  She needs to work on her diet.   Recommended [employer] Diet book.   Sounds like convenience and time spent on making food is an issue for her. She needs to increase in protein, less processed foods and refined sugars in order to truly lose the weight for good. If she just takes the medication without the adjustments she'll get weight loss but will likely end up gaining it back if she doesn't make these changes, this was discussed in detail today.   Also encouraged higher fiber in diet, could consider Intermittent fasting.   - phentermine (ADIPEX-P) 15 MG capsule; Take 1 capsule (15 mg) by mouth every morning    Acute deep vein thrombosis (DVT) of proximal vein of left lower extremity (H)  Her varicosities are the cause of her clots, see below regarding the veins.   She will remain on the Xarelto for 90 days total, refilled medication for the additional 2 months.   - rivaroxaban ANTICOAGULANT (XARELTO) 20 MG TABS tablet; Take 1 tablet (20 mg) by mouth daily (with dinner)    Varicose veins of bilateral lower extremities with other complications  Referral placed, should consider procedure to reduce risk of clot recurrence. Encouraged compression wear once clot has resolved.   - Vascular Surgery Referral; Future    Hopefully weight loss will help reduce her back pain, will discuss at that time.     Options for treatment and follow-up care were reviewed with the patient and/or guardian. Patient and/or guardian engaged in the decision making process and verbalized understanding of the options discussed and agreed with the final plan.     [facility]  "is a 57 year old, presenting for the following health issues:  Recheck Medication        8/7/2023     3:29 PM   Additional Questions   Roomed by Lillian REILLY   Accompanied by self       History of Present Illness       Reason for visit:  Follow up on weight loss medication    She eats 2-3 servings of fruits and vegetables daily.She consumes 1 sweetened beverage(s) daily.She exercises with enough effort to increase her heart rate 9 or less minutes per day.  She exercises with enough effort to increase her heart rate 3 or less days per week.   She is taking medications regularly.         Medication Followup of Bupropion  Taking Medication as prescribed: yes  Side Effects:  None  Medication Helping Symptoms:  NO, not doing anything    - Medication for weight not helping so far.   - Breakfast - eats an apple or banana, lunch can be some chips, and then dinner will eat.   - Still having the lower back pain, tylenol hasn't been helpful.   - The leg swelling has gone down. Pain improving. No side effects on the Xarelto so far.   - She consulted with vascular in the past but not recently.     Review of Systems   Constitutional, GI, , musculoskeletal, neuro, skin, endocrine and psych systems are negative, except as otherwise noted.      Objective    /86   Pulse 84   Temp 99  F (37.2  C) (Temporal)   Resp 18   Ht 1.651 m (5' 5\")   Wt 98.7 kg (217 lb 8 oz)   LMP 03/14/2017 (Exact Date)   SpO2 94%   BMI 36.19 kg/m    Body mass index is 36.19 kg/m .  Physical Exam   GENERAL: healthy, alert and no distress  MS: no gross musculoskeletal defects noted, no edema  SKIN: no suspicious lesions or rashes  PSYCH: mentation appears normal, affect normal/bright                      "

## 2023-08-17 ENCOUNTER — OFFICE VISIT (OUTPATIENT)
Dept: VASCULAR SURGERY | Facility: CLINIC | Age: 58
End: 2023-08-17
Attending: PHYSICIAN ASSISTANT
Payer: COMMERCIAL

## 2023-08-17 DIAGNOSIS — I82.462 ACUTE DEEP VEIN THROMBOSIS (DVT) OF CALF MUSCLE VEIN OF LEFT LOWER EXTREMITY (H): Primary | ICD-10-CM

## 2023-08-17 DIAGNOSIS — I83.893 VARICOSE VEINS OF BILATERAL LOWER EXTREMITIES WITH OTHER COMPLICATIONS: ICD-10-CM

## 2023-08-17 PROCEDURE — 99203 OFFICE O/P NEW LOW 30 MIN: CPT | Performed by: SURGERY

## 2023-08-17 NOTE — LETTER
2023         RE: Velma Rizvi  [address] [facility] [address]        Dear Colleague,    Thank you for referring your patient, Velma Rizvi, to the [facility] [place]. Please see a copy of my visit note below.    [facility] CONSULTATION    HPI:    Velma Rizvi is a pleasant 57 year old female referred by Shahid Hong PA-C regarding an apparent left lower extremity deep vein thrombosis of her left gastrocnemius vein near the junction with the popliteal vein and thrombosis of her left proximal to mid great saphenous vein on 2023.  She has been anticoagulated since that time and has had symptoms dissipated significantly.    She has no previous history of deep vein thrombosis but did suffer superficial thrombophlebitis of her left ankle vein several years ago.    Patient has worn compression for years.    Family history is significant for varicose veins and in maternal uncle and spider veins in her mother.  There is no family history of venous thromboembolism.    PAST MEDICAL HISTORY:   Past Medical History:   Diagnosis Date     Phlebitis and thrombophlebitis 10/12/2012     PONV (postoperative nausea and vomiting)        PAST SURGICAL HISTORY:   Past Surgical History:   Procedure Laterality Date     COLONOSCOPY N/A 2018    Procedure: COLONOSCOPY;  COLONOSCOPY;  Surgeon: Paresh Curran MD;  Location:  GI     HC TREATMENT INCOMPLETE  SURG, ANY TRIMESTER      D & C  s/p miscarriage     LAPAROSCOPIC CHOLECYSTECTOMY N/A 2019    Procedure: LAPAROSCOPIC CHOLECYSTECTOMY;  Surgeon: Rob Gonzales DO;  Location:  OR     [facility] NONSPECIFIC PROCEDURE      Right hand surgery- tendon repair       FAMILY HISTORY:   Family History   Problem Relation Age of Onset     Cancer Mother          - lung cancer with mets.     Respiratory Father         asthma     Lung Cancer Brother         Smoker     Hypertension Brother      Diabetes Brother       Cancer Maternal Grandmother         breast- 48 fatal age 51     Hypertension Son      Diabetes Maternal Uncle        SOCIAL HISTORY:   Social History     Tobacco Use     Smoking status: Former     Packs/day: 1.00     Years: 37.00     Pack years: 37.00     Types: Cigarettes     Start date: 1985     Quit date: 2023     Years since quittin.4     Passive exposure: Never     Smokeless tobacco: Never     Tobacco comments:     already quit   Substance Use Topics     Alcohol use: Not Currently     Comment: rarely       REVIEW OF SYSTEMS: Review Of Systems  Skin: negative  Eyes: negative  Ears/Nose/Throat: Hearing loss  Respiratory: No shortness of breath, dyspnea on exertion, cough, or hemoptysis  Cardiovascular: negative  Gastrointestinal: Heartburn  Genitourinary: negative  Musculoskeletal: Back pain, leg pain  Neurologic: Headaches  Psychiatric: negative  Hematologic/Lymphatic/Immunologic: negative  Endocrine: Hot flashes      Vital signs:  LMP 2017 (Exact Date)     Current Outpatient Medications   Medication Sig Dispense Refill     albuterol (PROAIR HFA) 108 (90 Base) MCG/ACT inhaler Inhale 2 puffs into the lungs every 4 hours as needed for shortness of breath / dyspnea 18 g 6     buPROPion (WELLBUTRIN XL) 300 MG 24 hr tablet Take 1 tablet (300 mg) by mouth every morning 90 tablet 1     COMPRESSION STOCKINGS 1 each daily Wear daily size large, two pair 2 each 0     fluticasone (FLONASE) 50 MCG/ACT nasal spray Spray 2 sprays into both nostrils daily 16 g 11     Fluticasone-Umeclidin-Vilanterol (TRELEGY ELLIPTA) 200-62.5-25 MCG/ACT oral inhaler Inhale 1 puff into the lungs daily 60 each 1     guaiFENesin (MUCINEX) 600 MG 12 hr tablet Take 1,200 mg by mouth 2 times daily       ibuprofen (ADVIL/MOTRIN) 200 MG tablet Take 800 mg by mouth every 8 hours as needed for pain       ipratropium - albuterol 0.5 mg/2.5 mg/3 mL (DUONEB) 0.5-2.5 (3) MG/3ML neb solution Take 1 vial (3 mLs) by nebulization every 4  hours as needed for shortness of breath, wheezing or cough 90 mL 0     phentermine (ADIPEX-P) 15 MG capsule Take 1 capsule (15 mg) by mouth every morning 30 capsule 0     rivaroxaban ANTICOAGULANT (XARELTO) 20 MG TABS tablet Take 1 tablet (20 mg) by mouth daily (with dinner) 60 tablet 0     Rivaroxaban ANTICOAGULANT 15 & 20 MG TBPK Starter Therapy Pack Take 15 mg by mouth 2 times daily (with meals) for 21 days, THEN 20 mg daily with food for 9 days. 51 each 0     spacer (OPTICHAMBER JOHNNY) holding chamber Use as needed with albuterol inhaler. 1 each 3     varenicline (CHANTIX CONTINUING MONTH [place]) 1 MG tablet Take 1 tablet (1 mg) by mouth 2 times daily 56 tablet 4     VITAMIN D, CHOLECALCIFEROL, PO Take 3,000 Units by mouth daily         PHYSICAL EXAM:  General: Pleasant, NAD.   HEENT: Normocephalic, atraumatic, external ears and nose normal.   Respiratory: Normal respiratory effort.   Cardiovascular: Pulse is regular.   Musculoskeletal: Gait and station normal.  The joints of her fingers and toes without deformity.  There is no cyanosis of her nailbeds.   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  Varicosities noted on the right anterior thigh/anteromedial thigh.  Mild ankle cut off sign.    Left lower extremity: Varicose veins on the left medial thigh extending onto the left medial calf.  Tissue deposition on the thighs consistent with lipedema.  There is mild excess tissue but deposition of the distal leg.  Mild ankle cut off sign.  PULSES: R/L (3=normal pulse, 0=no palpable pulse) dorsalis pedis: 3/3; posterior tibial: 3/1.      Neurologic: Grossly normal  Psychiatric: Mood, affect, judgment and insight are normal     Venous Duplex Ultrasound:   Narrative & Impression   EXAM: ULTRASOUND LOWER EXTREMITY VENOUS DUPLEX LEFT  LOCATION: [facility]  DATE: 07/23/2023     INDICATION: Mid thigh swelling, pain, history of varicose veins.  COMPARISON: Venous  ultrasound on 10/10/2012  TECHNIQUE: Venous Duplex ultrasound of the left lower extremity with and without compression, augmentation and duplex. Color flow and spectral Doppler with waveform analysis performed.     FINDINGS: Exam includes the common femoral, femoral, popliteal, and contralateral common femoral veins as well as segmentally visualized deep calf veins and greater saphenous vein.      LEFT: The left gastrocnemius vein is partially compressible in the proximal calf. The great saphenous vein is partially compressible in the proximal calf approximately 1.2 cm from the femoral saphenous junction and noncompressible in the mid thigh area.   No popliteal cyst.                                                                      IMPRESSION:  1.  Nonocclusive deep venous thrombosis of the left gastrocnemius vein in the proximal calf area.  2.  Occlusive and nonocclusive superficial thrombophlebitis of the left great saphenous vein in the proximal and mid thigh area.          ASSESSMENT:  Recent extensive superficial thrombophlebitis of the left great saphenous vein extending to near the saphenofemoral junction with concomitant left gastrocnemius vein thrombosis extending to near the popliteal vein.  Her left great saphenous vein thrombosis may have occurred secondary to venous insufficiency.    I recommend that she continue anticoagulation for 6 months as the thrombus within the gastrocnemius vein is extended close to the popliteal vein.  I will then see her in 6 months for a left lower extremity venous competency study to determine if she needs ablation of her left great saphenous vein in an effort to prevent recurrent superficial thrombophlebitis.  I discussed this frankly with her.    She should continue compression as best possible and be as active as possible.  She voiced understanding of our discussion.  Questions were answered.    PLAN:  Continue anticoagulation and return in 6 months for left lower  extremity venous competency study.     Torres Morris MD FACS    Dictated using Dragon voice recognition software which may result in transcription errors          [facility] LEG DRAWING:                Again, thank you for allowing me to participate in the care of your patient.        Sincerely,        Torres Morris MD

## 2023-08-17 NOTE — NURSING NOTE
Patient Reported symptoms:    Right leg   Heaviness None of the time   Achiness None of the time   Swelling Some of the time   Throbbing None of the time   Itching None of the time   Appearance Extremely noticeable   Impact on work/activities Symptoms but full able to participate    Left Leg   Heaviness None of the time   Achiness None of the time   Swelling Some of the time   Throbbing None of the time   Itching None of the time   Appearance Extremely noticeable   Impact on work/activities Symptoms but full able to participate

## 2023-08-21 NOTE — PROGRESS NOTES
[facility] CONSULTATION    HPI:    Velma Rizvi is a pleasant 57 year old female referred by Shahid Hong PA-C regarding an apparent left lower extremity deep vein thrombosis of her left gastrocnemius vein near the junction with the popliteal vein and thrombosis of her left proximal to mid great saphenous vein on 2023.  She has been anticoagulated since that time and has had symptoms dissipated significantly.    She has no previous history of deep vein thrombosis but did suffer superficial thrombophlebitis of her left ankle vein several years ago.    Patient has worn compression for years.    Family history is significant for varicose veins and in maternal uncle and spider veins in her mother.  There is no family history of venous thromboembolism.    PAST MEDICAL HISTORY:   Past Medical History:   Diagnosis Date    Phlebitis and thrombophlebitis 10/12/2012    PONV (postoperative nausea and vomiting)        PAST SURGICAL HISTORY:   Past Surgical History:   Procedure Laterality Date    COLONOSCOPY N/A 2018    Procedure: COLONOSCOPY;  COLONOSCOPY;  Surgeon: Paresh Curran MD;  Location:  GI    HC TREATMENT INCOMPLETE  SURG, ANY TRIMESTER      D & C  s/p miscarriage    LAPAROSCOPIC CHOLECYSTECTOMY N/A 2019    Procedure: LAPAROSCOPIC CHOLECYSTECTOMY;  Surgeon: Rob Gonzales DO;  Location:  OR    [facility] NONSPECIFIC PROCEDURE      Right hand surgery- tendon repair       FAMILY HISTORY:   Family History   Problem Relation Age of Onset    Cancer Mother          - lung cancer with mets.    Respiratory Father         asthma    Lung Cancer Brother         Smoker    Hypertension Brother     Diabetes Brother     Cancer Maternal Grandmother         breast- 48 fatal age 51    Hypertension Son     Diabetes Maternal Uncle        SOCIAL HISTORY:   Social History     Tobacco Use    Smoking status: Former     Packs/day: 1.00     Years: 37.00     Pack years: 37.00     Types:  Cigarettes     Start date: 1985     Quit date: 2023     Years since quittin.4     Passive exposure: Never    Smokeless tobacco: Never    Tobacco comments:     already quit   Substance Use Topics    Alcohol use: Not Currently     Comment: rarely       REVIEW OF SYSTEMS: Review Of Systems  Skin: negative  Eyes: negative  Ears/Nose/Throat: Hearing loss  Respiratory: No shortness of breath, dyspnea on exertion, cough, or hemoptysis  Cardiovascular: negative  Gastrointestinal: Heartburn  Genitourinary: negative  Musculoskeletal: Back pain, leg pain  Neurologic: Headaches  Psychiatric: negative  Hematologic/Lymphatic/Immunologic: negative  Endocrine: Hot flashes      Vital signs:  LMP 2017 (Exact Date)     Current Outpatient Medications   Medication Sig Dispense Refill    albuterol (PROAIR HFA) 108 (90 Base) MCG/ACT inhaler Inhale 2 puffs into the lungs every 4 hours as needed for shortness of breath / dyspnea 18 g 6    buPROPion (WELLBUTRIN XL) 300 MG 24 hr tablet Take 1 tablet (300 mg) by mouth every morning 90 tablet 1    COMPRESSION STOCKINGS 1 each daily Wear daily size large, two pair 2 each 0    fluticasone (FLONASE) 50 MCG/ACT nasal spray Spray 2 sprays into both nostrils daily 16 g 11    Fluticasone-Umeclidin-Vilanterol (TRELEGY ELLIPTA) 200-62.5-25 MCG/ACT oral inhaler Inhale 1 puff into the lungs daily 60 each 1    guaiFENesin (MUCINEX) 600 MG 12 hr tablet Take 1,200 mg by mouth 2 times daily      ibuprofen (ADVIL/MOTRIN) 200 MG tablet Take 800 mg by mouth every 8 hours as needed for pain      ipratropium - albuterol 0.5 mg/2.5 mg/3 mL (DUONEB) 0.5-2.5 (3) MG/3ML neb solution Take 1 vial (3 mLs) by nebulization every 4 hours as needed for shortness of breath, wheezing or cough 90 mL 0    phentermine (ADIPEX-P) 15 MG capsule Take 1 capsule (15 mg) by mouth every morning 30 capsule 0    rivaroxaban ANTICOAGULANT (XARELTO) 20 MG TABS tablet Take 1 tablet (20 mg) by mouth daily (with dinner) 60  tablet 0    Rivaroxaban ANTICOAGULANT 15 & 20 MG TBPK Starter Therapy Pack Take 15 mg by mouth 2 times daily (with meals) for 21 days, THEN 20 mg daily with food for 9 days. 51 each 0    spacer (OPTICHAMBER JOHNNY) holding chamber Use as needed with albuterol inhaler. 1 each 3    varenicline (CHANTIX CONTINUING MONTH [place]) 1 MG tablet Take 1 tablet (1 mg) by mouth 2 times daily 56 tablet 4    VITAMIN D, CHOLECALCIFEROL, PO Take 3,000 Units by mouth daily         PHYSICAL EXAM:  General: Pleasant, NAD.   HEENT: Normocephalic, atraumatic, external ears and nose normal.   Respiratory: Normal respiratory effort.   Cardiovascular: Pulse is regular.   Musculoskeletal: Gait and station normal.  The joints of her fingers and toes without deformity.  There is no cyanosis of her nailbeds.   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  Varicosities noted on the right anterior thigh/anteromedial thigh.  Mild ankle cut off sign.    Left lower extremity: Varicose veins on the left medial thigh extending onto the left medial calf.  Tissue deposition on the thighs consistent with lipedema.  There is mild excess tissue but deposition of the distal leg.  Mild ankle cut off sign.  PULSES: R/L (3=normal pulse, 0=no palpable pulse) dorsalis pedis: 3/3; posterior tibial: 3/1.      Neurologic: Grossly normal  Psychiatric: Mood, affect, judgment and insight are normal     Venous Duplex Ultrasound:   Narrative & Impression   EXAM: ULTRASOUND LOWER EXTREMITY VENOUS DUPLEX LEFT  LOCATION: [facility]  DATE: 07/23/2023     INDICATION: Mid thigh swelling, pain, history of varicose veins.  COMPARISON: Venous ultrasound on 10/10/2012  TECHNIQUE: Venous Duplex ultrasound of the left lower extremity with and without compression, augmentation and duplex. Color flow and spectral Doppler with waveform analysis performed.     FINDINGS: Exam includes the common femoral, femoral, popliteal, and  contralateral common femoral veins as well as segmentally visualized deep calf veins and greater saphenous vein.      LEFT: The left gastrocnemius vein is partially compressible in the proximal calf. The great saphenous vein is partially compressible in the proximal calf approximately 1.2 cm from the femoral saphenous junction and noncompressible in the mid thigh area.   No popliteal cyst.                                                                      IMPRESSION:  1.  Nonocclusive deep venous thrombosis of the left gastrocnemius vein in the proximal calf area.  2.  Occlusive and nonocclusive superficial thrombophlebitis of the left great saphenous vein in the proximal and mid thigh area.          ASSESSMENT:  Recent extensive superficial thrombophlebitis of the left great saphenous vein extending to near the saphenofemoral junction with concomitant left gastrocnemius vein thrombosis extending to near the popliteal vein.  Her left great saphenous vein thrombosis may have occurred secondary to venous insufficiency.    I recommend that she continue anticoagulation for 6 months as the thrombus within the gastrocnemius vein is extended close to the popliteal vein.  I will then see her in 6 months for a left lower extremity venous competency study to determine if she needs ablation of her left great saphenous vein in an effort to prevent recurrent superficial thrombophlebitis.  I discussed this frankly with her.    She should continue compression as best possible and be as active as possible.  She voiced understanding of our discussion.  Questions were answered.    PLAN:  Continue anticoagulation and return in 6 months for left lower extremity venous competency study.     Torres Morris MD FACS    Dictated using Dragon voice recognition software which may result in transcription errors          [facility] LEG DRAWING:

## 2023-08-23 ENCOUNTER — TRANSFERRED RECORDS (OUTPATIENT)
Dept: HEALTH INFORMATION MANAGEMENT | Facility: CLINIC | Age: 58
End: 2023-08-23
Payer: COMMERCIAL

## 2023-08-30 DIAGNOSIS — J44.9 COPD, MODERATE (H): ICD-10-CM

## 2023-08-30 RX ORDER — ALBUTEROL SULFATE 90 UG/1
2 AEROSOL, METERED RESPIRATORY (INHALATION) EVERY 4 HOURS PRN
Qty: 18 G | Refills: 6 | Status: SHIPPED | OUTPATIENT
Start: 2023-08-30 | End: 2024-09-09

## 2023-09-05 ENCOUNTER — OFFICE VISIT (OUTPATIENT)
Dept: FAMILY MEDICINE | Facility: OTHER | Age: 58
End: 2023-09-05
Payer: COMMERCIAL

## 2023-09-05 VITALS
WEIGHT: 216 LBS | TEMPERATURE: 97.7 F | BODY MASS INDEX: 35.99 KG/M2 | HEART RATE: 56 BPM | HEIGHT: 65 IN | OXYGEN SATURATION: 96 % | RESPIRATION RATE: 16 BRPM | SYSTOLIC BLOOD PRESSURE: 118 MMHG | DIASTOLIC BLOOD PRESSURE: 80 MMHG

## 2023-09-05 DIAGNOSIS — M65.4 DE QUERVAIN'S DISEASE (TENOSYNOVITIS): ICD-10-CM

## 2023-09-05 DIAGNOSIS — E66.812 CLASS 2 OBESITY WITHOUT SERIOUS COMORBIDITY WITH BODY MASS INDEX (BMI) OF 35.0 TO 35.9 IN ADULT, UNSPECIFIED OBESITY TYPE: Primary | ICD-10-CM

## 2023-09-05 PROCEDURE — 99214 OFFICE O/P EST MOD 30 MIN: CPT | Performed by: PHYSICIAN ASSISTANT

## 2023-09-05 RX ORDER — PHENTERMINE HYDROCHLORIDE 15 MG/1
15 CAPSULE ORAL EVERY MORNING
Qty: 30 CAPSULE | Refills: 0 | Status: CANCELLED | OUTPATIENT
Start: 2023-09-05

## 2023-09-05 RX ORDER — PHENTERMINE HYDROCHLORIDE 30 MG/1
30 CAPSULE ORAL EVERY MORNING
Qty: 30 CAPSULE | Refills: 0 | Status: SHIPPED | OUTPATIENT
Start: 2023-09-05 | End: 2023-10-03

## 2023-09-05 ASSESSMENT — PAIN SCALES - GENERAL: PAINLEVEL: NO PAIN (0)

## 2023-09-05 NOTE — PROGRESS NOTES
Assessment & Plan     Class 2 obesity without serious comorbidity with body mass index (BMI) of 35.0 to 35.9 in adult, unspecified obesity type  No side effects on medication, BP is within normal limits and heart rate normal.   Will increase dose to 30 mg and see if this helps.   Recommended avVenta Pal to help track her macros and make sure she isn't under eating on calories. She is waiting on [employer] book to help her make some more changes.   Discussed how important stress reduction is in weight loss as well.   - phentermine (ADIPEX-P) 30 MG capsule; Take 1 capsule (30 mg) by mouth every morning    De Quervain's disease (tenosynovitis)  Suspect De Quervain's given the location, it doesn't seem to be arthritic in nature as there is no bony joint tenderness and no crepitus or laxity noted.  Recommended thumb spica splinting with activity, topical NSAIDs as she can't take oral NSAIDs, ice, and stretching.  Consider OT if not improving.     Follow-up in 4 weeks, sooner if needed.     Options for treatment and follow-up care were reviewed with the patient and/or guardian. Patient and/or guardian engaged in the decision making process and verbalized understanding of the options discussed and agreed with the final plan.      SATYA Rich [facility] RYLAN Carreon is a 57 year old, presenting for the following health issues:  Weight Loss and Musculoskeletal Problem (Left )        9/5/2023     3:16 PM   Additional Questions   Roomed by LENNY   Accompanied by FOUZIA         9/5/2023     3:16 PM   Patient Reported Additional Medications   Patient reports taking the following new medications None       History of Present Illness       Reason for visit:  Weight loss    She eats 2-3 servings of fruits and vegetables daily.She consumes 0 sweetened beverage(s) daily.She exercises with enough effort to increase her heart rate 30 to 60 minutes per day.  She exercises with enough  "effort to increase her heart rate 5 days per week.   She is taking medications regularly.     Medication Followup of Phentermine  Taking Medication as prescribed: yes  Side Effects:  None  Medication Helping Symptoms:  Somewhat  Pain History:  When did you first notice your pain? 2 months    Have you seen anyone else for your pain? No  How has your pain affected your ability to work? 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 Musculoskeletal problem/pain  Onset/Duration: 2 months  Description  Location: wrist and thumb - left  Joint Swelling: No  Redness: No  Pain: YES  Warmth: No  Intensity:  mild, 0/10  Progression of Symptoms:  \"some days are worse than others\" and intermittent  Accompanying signs and symptoms:   Fevers: No  Numbness/tingling/weakness: No  History  Trauma to the area: No  Recent illness:  No  Previous similar problem: No  Previous evaluation:  No  Precipitating or alleviating factors:  Aggravating factors include: lifting, overuse, and putting pressure on the hand like when use it to get up off of the floor  Therapies tried and outcome: nothing      Phentermine has not been curbing her appetite which she wishes it would. She has not made much changes since her last visit because it has been stressful now. She ordered the book which she will not be getting for a while. Her new insurance will cover injectable medications as of 10/01/2023. When she weighed herself the first week, she lost 7 pounds, but it is slowly coming back on. She has been eating a lot of garden vegetables and salads. She is interested in trying protein shakes.     Her wrist has been bothering her for the last 2 months. She is right handed. She denies any injuries or falls. She has pain with bending her thumb or lifting something.     Review of Systems   Constitutional, cardiovascular, pulmonary, GI, , musculoskeletal, neuro, skin systems are " "negative, except as otherwise noted.      Objective    /80   Pulse 56   Temp 97.7  F (36.5  C) (Temporal)   Resp 16   Ht 1.651 m (5' 5\")   Wt 98 kg (216 lb)   LMP 03/14/2017 (Exact Date)   SpO2 96%   BMI 35.94 kg/m    Body mass index is 35.94 kg/m .  Physical Exam   GENERAL: healthy, alert and no distress  MS: no gross musculoskeletal defects noted, no edema.  Left wrist: full passive ROM without pain, non-tender to palpation over the MCPs, carpal bones, no bony tenderness over the radius and ulna including styloid processes.  Finkelstein's positive for pain along the radial side of the wrist.   PSYCH: mentation appears normal, affect normal/bright                      "

## 2023-09-05 NOTE — PATIENT INSTRUCTIONS
Voltaren - Diclofenac Gel  Apply 2-3 times per day over the wrist area.   Wear brace during the day  Range of motion and stretching of the thumb and wrist  Ice the area.   If not improving we'll get OT involved.     [employer] Pal gareth, log food.

## 2023-09-20 DIAGNOSIS — F17.200 TOBACCO USE DISORDER: ICD-10-CM

## 2023-09-20 RX ORDER — VARENICLINE TARTRATE 1 MG/1
1 TABLET, FILM COATED ORAL 2 TIMES DAILY
Qty: 56 TABLET | Refills: 4 | OUTPATIENT
Start: 2023-09-20

## 2023-09-20 NOTE — TELEPHONE ENCOUNTER
If she is still on this medication I can refill it but if she has been off of it, I'd recommend she restart the starter pack so she doesn't have side effects.     Shahid Hong PA-C

## 2023-09-27 DIAGNOSIS — J44.9 COPD, SEVERE (H): ICD-10-CM

## 2023-09-27 RX ORDER — FLUTICASONE FUROATE, UMECLIDINIUM BROMIDE AND VILANTEROL TRIFENATATE 200; 62.5; 25 UG/1; UG/1; UG/1
1 POWDER RESPIRATORY (INHALATION) DAILY
Qty: 60 EACH | Refills: 1 | Status: SHIPPED | OUTPATIENT
Start: 2023-09-27 | End: 2023-11-29

## 2023-10-03 DIAGNOSIS — E66.812 CLASS 2 OBESITY WITHOUT SERIOUS COMORBIDITY WITH BODY MASS INDEX (BMI) OF 35.0 TO 35.9 IN ADULT, UNSPECIFIED OBESITY TYPE: ICD-10-CM

## 2023-10-03 RX ORDER — PHENTERMINE HYDROCHLORIDE 30 MG/1
30 CAPSULE ORAL EVERY MORNING
Qty: 30 CAPSULE | Refills: 0 | Status: SHIPPED | OUTPATIENT
Start: 2023-10-03 | End: 2023-11-03

## 2023-10-09 ENCOUNTER — OFFICE VISIT (OUTPATIENT)
Dept: FAMILY MEDICINE | Facility: OTHER | Age: 58
End: 2023-10-09
Payer: COMMERCIAL

## 2023-10-09 VITALS
SYSTOLIC BLOOD PRESSURE: 128 MMHG | DIASTOLIC BLOOD PRESSURE: 82 MMHG | BODY MASS INDEX: 33.43 KG/M2 | OXYGEN SATURATION: 99 % | TEMPERATURE: 98.4 F | HEART RATE: 84 BPM | RESPIRATION RATE: 16 BRPM | WEIGHT: 208 LBS | HEIGHT: 66 IN

## 2023-10-09 DIAGNOSIS — E66.812 CLASS 2 OBESITY WITHOUT SERIOUS COMORBIDITY WITH BODY MASS INDEX (BMI) OF 35.0 TO 35.9 IN ADULT, UNSPECIFIED OBESITY TYPE: Primary | ICD-10-CM

## 2023-10-09 PROCEDURE — 99213 OFFICE O/P EST LOW 20 MIN: CPT | Performed by: PHYSICIAN ASSISTANT

## 2023-10-09 RX ORDER — PHENTERMINE HYDROCHLORIDE 37.5 MG/1
37.5 CAPSULE ORAL EVERY MORNING
Qty: 30 CAPSULE | Refills: 1 | Status: SHIPPED | OUTPATIENT
Start: 2023-10-09 | End: 2023-12-07

## 2023-10-09 ASSESSMENT — PAIN SCALES - GENERAL: PAINLEVEL: NO PAIN (0)

## 2023-10-09 NOTE — PROGRESS NOTES
Assessment & Plan     Class 2 obesity without serious comorbidity with body mass index (BMI) of 35.0 to 35.9 in adult, unspecified obesity type  We will increase the dose of medication as she is tolerating it.   Encouraged her to increase her calorie count closer to 1800 calories per day.   She will try to increase in her protein levels slightly as well as more vegetables and fruits.   Will have her try and reduce down in the processed foods like pizza and cookies.   - phentermine (ADIPEX-P) 37.5 MG capsule; Take 1 capsule (37.5 mg) by mouth every morning    The thumb is still an issue, recommended that she wear the brace during the day as well as night bracing has been helpful for her.  .     Follow-up in 2 months, sooner if needed.     Options for treatment and follow-up care were reviewed with the patient and/or guardian. Patient and/or guardian engaged in the decision making process and verbalized understanding of the options discussed and agreed with the final plan.     Shahid Hong PA-C  [facility] RYLAN Crareon is a 57 year old, presenting for the following health issues:  Recheck Medication (Phentermine) and RECHECK (tenosynovitis)      10/9/2023     4:30 PM   Additional Questions   Roomed by Whitley   Accompanied by Self         10/9/2023     4:30 PM   Patient Reported Additional Medications   Patient reports taking the following new medications none       History of Present Illness       Reason for visit:  To check on my weight    She eats 2-3 servings of fruits and vegetables daily.She consumes 0 sweetened beverage(s) daily.She exercises with enough effort to increase her heart rate 30 to 60 minutes per day.  She exercises with enough effort to increase her heart rate 4 days per week.   She is taking medications regularly.         Medication Followup of Phentermine 30 mg  Taking Medication as prescribed: yes  Side Effects:  None  Medication Helping Symptoms:   "yes  She Is currently down 8 lbs.   No side effects on the medication.   Still feels like she is craving a lot of food.   Reviewed food log - for the most part has increased in amount she is eating, having more protein, turkey options, fruit/vegetables increased, there is still a lot of processed foods - pizza, pizza rolls, etc.     RECHECK: tenosynovitis    Review of Systems   Constitutional, HEENT, cardiovascular, pulmonary, gi and gu systems are negative, except as otherwise noted.      Objective    /82   Pulse 84   Temp 98.4  F (36.9  C) (Temporal)   Resp 16   Ht 1.668 m (5' 5.67\")   Wt 94.3 kg (208 lb)   LMP 03/14/2017 (Exact Date)   SpO2 99%   BMI 33.91 kg/m    Body mass index is 33.91 kg/m .  Physical Exam   GENERAL: healthy, alert and no distress  RESP: lungs clear to auscultation - no rales, rhonchi or wheezes  CV: regular rate and rhythm, normal S1 S2, no S3 or S4, no murmur, click or rub, no peripheral edema and peripheral pulses strong  MS: no gross musculoskeletal defects noted, no edema                      "

## 2023-10-26 ENCOUNTER — HOSPITAL ENCOUNTER (EMERGENCY)
Facility: CLINIC | Age: 58
Discharge: HOME | End: 2023-10-26
Attending: NURSE PRACTITIONER | Admitting: NURSE PRACTITIONER
Payer: COMMERCIAL

## 2023-10-26 ENCOUNTER — APPOINTMENT (OUTPATIENT)
Dept: GENERAL RADIOLOGY | Facility: CLINIC | Age: 58
End: 2023-10-26
Attending: NURSE PRACTITIONER
Payer: COMMERCIAL

## 2023-10-26 VITALS
DIASTOLIC BLOOD PRESSURE: 97 MMHG | WEIGHT: 205.9 LBS | TEMPERATURE: 98.1 F | HEIGHT: 66 IN | HEART RATE: 97 BPM | OXYGEN SATURATION: 99 % | BODY MASS INDEX: 33.09 KG/M2 | SYSTOLIC BLOOD PRESSURE: 130 MMHG | RESPIRATION RATE: 22 BRPM

## 2023-10-26 DIAGNOSIS — J18.9 PNEUMONIA OF RIGHT UPPER LOBE DUE TO INFECTIOUS ORGANISM: ICD-10-CM

## 2023-10-26 DIAGNOSIS — J44.1 COPD EXACERBATION (H): ICD-10-CM

## 2023-10-26 PROCEDURE — 99284 EMERGENCY DEPT VISIT MOD MDM: CPT | Performed by: NURSE PRACTITIONER

## 2023-10-26 PROCEDURE — 99284 EMERGENCY DEPT VISIT MOD MDM: CPT | Mod: 25 | Performed by: NURSE PRACTITIONER

## 2023-10-26 PROCEDURE — 71045 X-RAY EXAM CHEST 1 VIEW: CPT

## 2023-10-26 RX ORDER — AZITHROMYCIN 250 MG/1
TABLET, FILM COATED ORAL
Qty: 6 TABLET | Refills: 0 | Status: SHIPPED | OUTPATIENT
Start: 2023-10-26 | End: 2023-10-31

## 2023-10-26 RX ORDER — PREDNISONE 20 MG/1
40 TABLET ORAL DAILY
Qty: 10 TABLET | Refills: 0 | Status: SHIPPED | OUTPATIENT
Start: 2023-10-26 | End: 2023-11-10

## 2023-10-26 ASSESSMENT — ACTIVITIES OF DAILY LIVING (ADL): ADLS_ACUITY_SCORE: 35

## 2023-10-27 ENCOUNTER — MYC MEDICAL ADVICE (OUTPATIENT)
Dept: FAMILY MEDICINE | Facility: OTHER | Age: 58
End: 2023-10-27
Payer: COMMERCIAL

## 2023-10-27 NOTE — ED PROVIDER NOTES
History     Chief Complaint   Patient presents with    Shortness of Breath     HPI  Velma Rizvi is a 57 year old female with history of COPD who presents for evaluation of cough and shortness of breath.  Symptoms started on Monday with cough and nasal congestion.  Cough is productive.  She has noted over the last couple days some yellow sputum and nasal secretions.  This evening she was taking a bath and was coughing a lot more and started to feel some pain in her right mid back.  She did a neb treatment prior to arrival but did not help. No fevers.    Allergies:  No Known Allergies    Problem List:    Patient Active Problem List    Diagnosis Date Noted    COPD, severe (H) 2019     Priority: Medium    Peripheral edema 2018     Priority: Medium    Obesity, Class I, BMI 30-34.9 2017     Priority: Medium    Varicose veins of legs 2011     Priority: Medium     See abnormal US study.      Tobacco use disorder 2008     Priority: Medium        Past Medical History:    Past Medical History:   Diagnosis Date    Phlebitis and thrombophlebitis 10/12/2012    PONV (postoperative nausea and vomiting)        Past Surgical History:    Past Surgical History:   Procedure Laterality Date    COLONOSCOPY N/A 2018    Procedure: COLONOSCOPY;  COLONOSCOPY;  Surgeon: Paresh Curran MD;  Location:  GI    HC TREATMENT INCOMPLETE  SURG, ANY TRIMESTER      D & C  s/p miscarriage    LAPAROSCOPIC CHOLECYSTECTOMY N/A 2019    Procedure: LAPAROSCOPIC CHOLECYSTECTOMY;  Surgeon: Rob Gonzales DO;  Location:  OR    [facility] NONSPECIFIC PROCEDURE      Right hand surgery- tendon repair       Family History:    Family History   Problem Relation Age of Onset    Cancer Mother          - lung cancer with mets.    Respiratory Father         asthma    Lung Cancer Brother         Smoker    Hypertension Brother     Diabetes Brother     Cancer Maternal Grandmother         breast- 48  "fatal age 51    Hypertension Son     Diabetes Maternal Uncle        Social History:  Marital Status:   [2]  Social History     Tobacco Use    Smoking status: Former     Packs/day: 1.00     Years: 37.00     Additional pack years: 0.00     Total pack years: 37.00     Types: Cigarettes     Start date: 1985     Quit date: 2023     Years since quittin.6     Passive exposure: Never    Smokeless tobacco: Never    Tobacco comments:     already quit   Vaping Use    Vaping Use: Never used   Substance Use Topics    Alcohol use: Not Currently     Comment: rarely    Drug use: No        Medications:    albuterol (PROAIR HFA) 108 (90 Base) MCG/ACT inhaler  buPROPion (WELLBUTRIN XL) 300 MG 24 hr tablet  COMPRESSION STOCKINGS  fluticasone (FLONASE) 50 MCG/ACT nasal spray  Fluticasone-Umeclidin-Vilanterol (TRELEGY ELLIPTA) 200-62.5-25 MCG/ACT oral inhaler  ibuprofen (ADVIL/MOTRIN) 200 MG tablet  ipratropium - albuterol 0.5 mg/2.5 mg/3 mL (DUONEB) 0.5-2.5 (3) MG/3ML neb solution  phentermine (ADIPEX-P) 15 MG capsule  phentermine (ADIPEX-P) 30 MG capsule  phentermine (ADIPEX-P) 37.5 MG capsule  rivaroxaban ANTICOAGULANT (XARELTO) 20 MG TABS tablet  Rivaroxaban ANTICOAGULANT 15 & 20 MG TBPK Starter Therapy Pack  spacer (OPTICHAMBER JOHNNY) holding chamber  varenicline (CHANTIX CONTINUING MONTH [place]) 1 MG tablet  VITAMIN D, CHOLECALCIFEROL, PO          Review of Systems  As mentioned above in the history present illness. All other systems were reviewed and are negative.    Physical Exam   BP: (!) 143/78  Pulse: 100  Temp: 98.1  F (36.7  C)  Resp: 22  Height: 167.6 cm (5' 6\")  Weight: 93.4 kg (205 lb 14.4 oz)  SpO2: 93 %      Physical Exam  Constitutional:       General: She is not in acute distress.     Appearance: Normal appearance. She is well-developed. She is not ill-appearing.   HENT:      Head: Normocephalic and atraumatic.      Right Ear: External ear normal.      Left Ear: External ear normal.      Nose: Nose " normal.      Mouth/Throat:      Mouth: Mucous membranes are moist.   Eyes:      Conjunctiva/sclera: Conjunctivae normal.   Cardiovascular:      Rate and Rhythm: Normal rate and regular rhythm.      Heart sounds: Normal heart sounds. No murmur heard.  Pulmonary:      Effort: Pulmonary effort is normal. No respiratory distress.      Breath sounds: Decreased breath sounds (Diminished in bilateral bases.) present.      Comments: Speaking in full sentences.  Abdominal:      General: Bowel sounds are normal. There is no distension.      Palpations: Abdomen is soft.      Tenderness: There is no abdominal tenderness.   Musculoskeletal:         General: Normal range of motion.   Skin:     General: Skin is warm and dry.      Findings: No rash.   Neurological:      General: No focal deficit present.      Mental Status: She is alert and oriented to person, place, and time.         ED Course                 Procedures            Results for orders placed or performed during the hospital encounter of 10/26/23 (from the past 24 hour(s))   XR Chest Port 1 View    Narrative    EXAM: XR CHEST PORT 1 VIEW  LOCATION: [facility]  DATE: 10/26/2023    INDICATION: cough.  COMPARISON: None.      Impression    IMPRESSION: New subtle opacity in the right upper lung laterally, superimposed upon emphysema as seen on 12/21/2022 chest CT, suspicious for pneumonia. Left lung clear. No pleural effusion or pneumothorax. Normal heart size.       Medications - No data to display    Assessments & Plan (with Medical Decision Making)   57-year-old female with right upper lobe pneumonia noted on imaging today.  Symptoms started 3 days ago.  She has history of COPD, with likely exacerbation related to her current course of illness.   She does have a remote history of a DVT in July, but is no longer on anticoagulation.  She reports she saw vascular surgery and was told she only needed 90 days of treatment.  I did consider PE,  but her symptoms do seem more consistent with COPD and pneumonia.  She has no lower extremity edema or pain.  She has no respiratory distress.  No hypoxia.     Plan:  Prednisone 40 mg daily for 5 days.  Azithromycin 500 mg today, then 250 mg daily Day 2-5.  Augmentin 875 mg twice a day for 10 days.    Continue to use your inhaler/nebs as prescribed as needed.    Recheck in clinic in 1 week.    Return to the emergency department for worsening symptoms.    New Prescriptions    No medications on file       Final diagnoses:   Pneumonia of right middle lobe due to infectious organism       10/26/2023   [facility] EMERGENCY DEPT       Shanthi, MIN Banegas CNP  10/27/23 0102

## 2023-10-27 NOTE — DISCHARGE INSTRUCTIONS
Prednisone 40 mg daily for 5 days.  Azithromycin 500 mg today, then 250 mg daily Day 2-5.  Augmentin 875 mg twice a day for 10 days.    Continue to use your inhaler/nebs as prescribed as needed.    Recheck in clinic in 1 week.    Return to the emergency department for worsening symptoms.

## 2023-10-27 NOTE — ED TRIAGE NOTES
Was taking a bath, started coughing, then developed right upper flank pain that goes to her back.  Also having shortness of breath with this.      Triage Assessment (Adult)       Row Name 10/26/23 7173          Triage Assessment    Airway WDL WDL        Respiratory WDL    Respiratory WDL WDL        Skin Circulation/Temperature WDL    Skin Circulation/Temperature WDL WDL        Cardiac WDL    Cardiac WDL WDL        Cognitive/Neuro/Behavioral WDL    Cognitive/Neuro/Behavioral WDL WDL

## 2023-11-03 ENCOUNTER — OFFICE VISIT (OUTPATIENT)
Dept: FAMILY MEDICINE | Facility: OTHER | Age: 58
End: 2023-11-03
Payer: COMMERCIAL

## 2023-11-03 VITALS
BODY MASS INDEX: 33.03 KG/M2 | WEIGHT: 205.5 LBS | HEIGHT: 66 IN | SYSTOLIC BLOOD PRESSURE: 128 MMHG | DIASTOLIC BLOOD PRESSURE: 82 MMHG | HEART RATE: 82 BPM | RESPIRATION RATE: 18 BRPM | TEMPERATURE: 97.5 F | OXYGEN SATURATION: 100 %

## 2023-11-03 DIAGNOSIS — J18.9 PNEUMONIA DUE TO INFECTIOUS ORGANISM, UNSPECIFIED LATERALITY, UNSPECIFIED PART OF LUNG: Primary | ICD-10-CM

## 2023-11-03 DIAGNOSIS — Z87.891 PERSONAL HISTORY OF TOBACCO USE: ICD-10-CM

## 2023-11-03 PROCEDURE — 99213 OFFICE O/P EST LOW 20 MIN: CPT | Performed by: PHYSICIAN ASSISTANT

## 2023-11-03 PROCEDURE — G0296 VISIT TO DETERM LDCT ELIG: HCPCS | Performed by: PHYSICIAN ASSISTANT

## 2023-11-03 ASSESSMENT — PAIN SCALES - GENERAL: PAINLEVEL: MILD PAIN (2)

## 2023-11-03 NOTE — PROGRESS NOTES
Assessment & Plan     Pneumonia due to infectious organism, unspecified laterality, unspecified part of lung  Overall is healing well. Discussed Augmentin can be hard on the stomach, the pain has been very intermittent, monitor and recommended probiotics. Finish antibiotics. Continue on nebulizer as needed. Recommended Mucinex now and in the future when she gets sick.     Personal history of tobacco use  Due in December  - Prof fee: Shared Decision Making for Lung Cancer Screening  - CT Chest Lung Cancer Scrn Low Dose wo; Future    Options for treatment and follow-up care were reviewed with the patient and/or guardian. Patient and/or guardian engaged in the decision making process and verbalized understanding of the options discussed and agreed with the final plan.     SATYA Rich [facility] RYLAN Carreon is a 57 year old, presenting for the following health issues:  ER F/U        11/3/2023     9:13 AM   Additional Questions   Roomed by Whitley   Accompanied by Self         11/3/2023     9:13 AM   Patient Reported Additional Medications   Patient reports taking the following new medications none       HPI       ED/UC Followup:    Facility:  [facility] ER  Date of visit: 10/26/2023  Reason for visit: Shortness of breath, Pneumonia right upper lobe  Current Status: Feels better still having some right rib area discomfort.     Symptoms started last week Monday with cold symptoms - cough, congestion, yellow phlegm.   Thursday she was feeling fine but then went into the bathroom to take a bath and started to cough.    She had pain on the right side of her ribs. She didn't feel well so they went to the ER.   She was found to have pneumonia. She was put on steroids, antibiotics and has been using her neb treatments twice daily  Since then feeling better. Still has some right sided chest pain that will radiate around.   No fevers, chills. No sore throat. Cough  "is improving. No shortness of breath.   Has had a couple of episodes of some abdominal discomfort since on the antibiotics.     Review of Systems   Constitutional, HEENT, cardiovascular, pulmonary, GI, , musculoskeletal, neuro, skin  systems are negative, except as otherwise noted.      Objective    /82   Pulse 82   Temp 97.5  F (36.4  C) (Temporal)   Resp 18   Ht 1.676 m (5' 6\")   Wt 94.6 kg (208 lb 8 oz)   LMP 03/14/2017 (Exact Date)   SpO2 100%   Breastfeeding No   BMI 33.65 kg/m    Body mass index is 33.65 kg/m .  Physical Exam   GENERAL: healthy, alert and no distress  EYES: Eyes grossly normal to inspection, PERRL and conjunctivae and sclerae normal  HENT: ear canals and TM's normal, nose and mouth without ulcers or lesions  NECK: no adenopathy, no asymmetry, masses, or scars and thyroid normal to palpation  RESP: lungs clear to auscultation - no rales, rhonchi or wheezes  CV: regular rate and rhythm, normal S1 S2, no S3 or S4, no murmur, click or rub, no peripheral edema and peripheral pulses strong  MS: no gross musculoskeletal defects noted, no edema    No results found for any visits on 11/03/23.                  "

## 2023-11-03 NOTE — PATIENT INSTRUCTIONS
Lung Cancer Screening   Frequently Asked Questions  If you are at high-risk for lung cancer, getting screened with low-dose computed tomography (LDCT) every year can help save your life. This handout offers answers to some of the most common questions about lung cancer screening. If you have other questions, please call [phone] ([phone]).     What is it?  Lung cancer screening uses special X-ray technology to create an image of your lung tissue. The exam is quick and easy and takes less than 10 seconds. We don t give you any medicine or use any needles. You can eat before and after the exam. You don t need to change your clothes as long as the clothing on your chest doesn t contain metal. But, you do need to be able to hold your breath for at least 6 seconds during the exam.    What is the goal of lung cancer screening?  The goal of lung cancer screening is to save lives. Many times, lung cancer is not found until a person starts having physical symptoms. Lung cancer screening can help detect lung cancer in the earliest stages when it may be easier to treat.    Who should be screened for lung cancer?  We suggest lung cancer screening for anyone who is at high-risk for lung cancer. You are in the high-risk group if you:      are between the ages of 55 and 79, and    have smoked at least 1 pack of cigarettes a day for 20 or more years, and    still smoke or have quit within the past 15 years.    However, if you have a new cough or shortness of breath, you should talk to your doctor before being screened.    Why does it matter if I have symptoms?  Certain symptoms can be a sign that you have a condition in your lungs that should be checked and treated by your doctor. These symptoms include fever, chest pain, a new or changing cough, shortness of breath that you have never felt before, coughing up blood or unexplained weight loss. Having any of these symptoms can greatly affect the results of lung  cancer screening.       Should all smokers get an LDCT lung cancer screening exam?  It depends. Lung cancer screening is for a very specific group of men and women who have a history of heavy smoking over a long period of time (see  Who should be screened for lung cancer  above).  I am in the high-risk group, but have been diagnosed with cancer in the past. Is LDCT lung cancer screening right for me?  In some cases, you should not have LDCT lung screening, such as when your doctor is already following your cancer with CT scan studies. Your doctor will help you decide if LDCT lung screening is right for you.  Do I need to have a screening exam every year?  Yes. If you are in the high-risk group described earlier, you should get an LDCT lung cancer screening exam every year until you are 79, or are no longer willing or able to undergo screening and possible procedures to diagnose and treat lung cancer.  How effective is LDCT at preventing death from lung cancer?  Studies have shown that LDCT lung cancer screening can lower the risk of death from lung cancer by 20 percent in people who are at high-risk.  What are the risks?  There are some risks and limitations of LDCT lung cancer screening. We want to make sure you understand the risks and benefits, so please let us know if you have any questions. Your doctor may want to talk with you more about these risks.    Radiation exposure: As with any exam that uses radiation, there is a very small increased risk of cancer. The amount of radiation in LDCT is small--about the same amount a person would get from a mammogram. Your doctor orders the exam when he or she feels the potential benefits outweigh the risks.    False negatives: No test is perfect, including LDCT. It is possible that you may have a medical condition, including lung cancer, that is not found during your exam. This is called a false negative result.    False positives and more testing: LDCT very often finds  something in the lung that could be cancer, but in fact is not. This is called a false positive result. False positive tests often cause anxiety. To make sure these findings are not cancer, you may need to have more tests. These tests will be done only if you give us permission. Sometimes patients need a treatment that can have side effects, such as a biopsy. For more information on false positives, see  What can I expect from the results?     Findings not related to lung cancer: Your LDCT exam also takes pictures of areas of your body next to your lungs. In a very small number of cases, the CT scan will show an abnormal finding in one of these areas, such as your kidneys, adrenal glands, liver or thyroid. This finding may not be serious, but you may need more tests. Your doctor can help you decide what other tests you may need, if any.  What can I expect from the results?  About 1 out of 4 LDCT exams will find something that may need more tests. Most of the time, these findings are lung nodules. Lung nodules are very small collections of tissue in the lung. These nodules are very common, and the vast majority--more than 97 percent--are not cancer (benign). Most are normal lymph nodes or small areas of scarring from past infections.  But, if a small lung nodule is found to be cancer, the cancer can be cured more than 90 percent of the time. To know if the nodule is cancer, we may need to get more images before your next yearly screening exam. If the nodule has suspicious features (for example, it is large, has an odd shape or grows over time), we will refer you to a specialist for further testing.  Will my doctor also get the results?  Yes. Your doctor will get a copy of your results.  Is it okay to keep smoking now that there s a cancer screening exam?  No. Tobacco is one of the strongest cancer-causing agents. It causes not only lung cancer, but other cancers and cardiovascular (heart) diseases as well. The damage  caused by smoking builds over time. This means that the longer you smoke, the higher your risk of disease. While it is never too late to quit, the sooner you quit, the better.  Where can I find help to quit smoking?  The best way to prevent lung cancer is to stop smoking. If you have already quit smoking, congratulations and keep it up! For help on quitting smoking, please call [employer] at 1-056-QUITNOW (1-589.493.4500) or the American [employer] Society at 1-152.768.8036 to find local resources near you.  One-on-one health coaching:  If you d prefer to work individually with a health care provider on tobacco cessation, we offer:      Medication Therapy Management:  Our specially trained pharmacists work closely with you and your doctor to help you quit smoking.  Call [phone] or [phone] (toll free).

## 2023-11-03 NOTE — PROGRESS NOTES
Lung Cancer Screening Shared Decision Making Visit     Velma Rizvi, a 57 year old female, is eligible for lung cancer screening    History   Smoking Status    Former    Packs/day: 1.00    Years: 37.00    Types: Cigarettes    Start date: 5/1/1985    Quit date: 2/20/2023   Smokeless Tobacco    Never       I have discussed with patient the risks and benefits of screening for lung cancer with low-dose CT.     The risks include:    radiation exposure: one low dose chest CT has as much ionizing radiation as about 15 chest x-rays, or 6 months of background radiation living in [place]      false positives: most findings/nodules are NOT cancer, but some might still require additional diagnostic evaluation, including biopsy    over-diagnosis: some slow growing cancers that might never have been clinically significant will be detected and treated unnecessarily     The benefit of early detection of lung cancer is contingent upon adherence to annual screening or more frequent follow up if indicated.     Furthermore, to benefit from screening, Velma must be willing and able to undergo diagnostic procedures, if indicated. Although no specific guide is available for determining severity of comorbidities, it is reasonable to withhold screening in patients who have greater mortality risk from other diseases.     We did discuss that the best way to prevent lung cancer is to not smoke.    Some patients may value a numeric estimation of lung cancer risk when evaluating if lung cancer screening is right for them, here is one calculator:    ShouldIScreen

## 2023-11-10 ENCOUNTER — HOSPITAL ENCOUNTER (EMERGENCY)
Facility: CLINIC | Age: 58
Discharge: HOME | End: 2023-11-10
Attending: EMERGENCY MEDICINE | Admitting: EMERGENCY MEDICINE
Payer: COMMERCIAL

## 2023-11-10 ENCOUNTER — PATIENT OUTREACH (OUTPATIENT)
Dept: CARE COORDINATION | Facility: CLINIC | Age: 58
End: 2023-11-10

## 2023-11-10 VITALS
DIASTOLIC BLOOD PRESSURE: 93 MMHG | OXYGEN SATURATION: 98 % | HEIGHT: 66 IN | BODY MASS INDEX: 32.95 KG/M2 | HEART RATE: 86 BPM | RESPIRATION RATE: 16 BRPM | SYSTOLIC BLOOD PRESSURE: 146 MMHG | WEIGHT: 205 LBS | TEMPERATURE: 98.1 F

## 2023-11-10 DIAGNOSIS — M54.50 ACUTE BILATERAL LOW BACK PAIN WITHOUT SCIATICA: ICD-10-CM

## 2023-11-10 LAB
ALBUMIN UR-MCNC: NEGATIVE MG/DL
APPEARANCE UR: CLEAR
BACTERIA #/AREA URNS HPF: ABNORMAL /HPF
BILIRUB UR QL STRIP: NEGATIVE
COLOR UR AUTO: ABNORMAL
GLUCOSE UR STRIP-MCNC: NEGATIVE MG/DL
HGB UR QL STRIP: NEGATIVE
KETONES UR STRIP-MCNC: NEGATIVE MG/DL
LEUKOCYTE ESTERASE UR QL STRIP: ABNORMAL
MUCOUS THREADS #/AREA URNS LPF: PRESENT /LPF
NITRATE UR QL: NEGATIVE
PH UR STRIP: 7 [PH] (ref 5–7)
RBC URINE: 1 /HPF
SP GR UR STRIP: 1.01 (ref 1–1.03)
SQUAMOUS EPITHELIAL: 1 /HPF
UROBILINOGEN UR STRIP-MCNC: NORMAL MG/DL
WBC URINE: 1 /HPF

## 2023-11-10 PROCEDURE — 99284 EMERGENCY DEPT VISIT MOD MDM: CPT | Performed by: EMERGENCY MEDICINE

## 2023-11-10 PROCEDURE — 81001 URINALYSIS AUTO W/SCOPE: CPT | Performed by: EMERGENCY MEDICINE

## 2023-11-10 RX ORDER — GLUCOSAMINE HCL 500 MG
75 TABLET ORAL DAILY
COMMUNITY

## 2023-11-10 RX ORDER — ONDANSETRON 4 MG/1
4 TABLET, ORALLY DISINTEGRATING ORAL EVERY 8 HOURS PRN
Qty: 15 TABLET | Refills: 0 | Status: SHIPPED | OUTPATIENT
Start: 2023-11-10 | End: 2024-03-14

## 2023-11-10 RX ORDER — HYDROCODONE BITARTRATE AND ACETAMINOPHEN 5; 325 MG/1; MG/1
1 TABLET ORAL EVERY 6 HOURS PRN
Qty: 10 TABLET | Refills: 0 | Status: SHIPPED | OUTPATIENT
Start: 2023-11-10 | End: 2023-11-13

## 2023-11-10 RX ORDER — METHYLPREDNISOLONE 4 MG
TABLET, DOSE PACK ORAL
Qty: 21 TABLET | Refills: 0 | Status: SHIPPED | OUTPATIENT
Start: 2023-11-10 | End: 2023-12-18

## 2023-11-10 RX ORDER — CYCLOBENZAPRINE HCL 10 MG
5 TABLET ORAL 3 TIMES DAILY PRN
Qty: 10 TABLET | Refills: 0 | Status: SHIPPED | OUTPATIENT
Start: 2023-11-10 | End: 2023-11-17

## 2023-11-10 ASSESSMENT — ACTIVITIES OF DAILY LIVING (ADL)
ADLS_ACUITY_SCORE: 37
ADLS_ACUITY_SCORE: 33

## 2023-11-10 NOTE — PROGRESS NOTES
[facility] Transitions Program  Community Health Worker Outreach        Community Health Worker Initial Outreach    CHW Initial Information Gathering:  Referral Source: ED Follow-Up  Preferred Hospital: [facility] [place]  [phone]  Preferred Urgent Care: [facility] [place], [phone]  Current living arrangement:: I live in a private home with family  Type of residence:: Private home - stairs  Community Resources: None  Supplies Currently Used at Home: None  Equipment Currently Used at Home: none  Informal Support system:: Children, Family  No PCP office visit in Past Year: Yes  Transportation means:: Regular car  CHW Additional Questions  If ED/Hospital discharge, follow-up appointment scheduled as recommended?: Yes  Medication changes made following ED/Hospital discharge?: No  MyChart active?: Yes    Patient accepts CC: No, not interested at this time. Handed patient folder with Care Coordination information in it.        Social Determinants of Health     Food Insecurity: Low Risk  (11/10/2023)    Food Insecurity     Within the past 12 months, did you worry that your food would run out before you got money to buy more?: No     Within the past 12 months, did the food you bought just not last and you didn t have money to get more?: No   Depression: Not at risk (7/5/2023)    PHQ-2     PHQ-2 Score: 0   Housing Stability: Low Risk  (11/10/2023)    Housing Stability     Do you have housing? : Yes     Are you worried about losing your housing?: No   Tobacco Use: Medium Risk (11/10/2023)    Patient History     Smoking Tobacco Use: Former     Smokeless Tobacco Use: Never     Passive Exposure: Never   Financial Resource Strain: Low Risk  (11/10/2023)    Financial Resource Strain     Within the past 12 months, have you or your family members you live with been unable to get utilities (heat, electricity) when it was really needed?: No   Alcohol Use: Not on file    Transportation Needs: Low Risk  (10/8/2023)    Transportation Needs     Within the past 12 months, has lack of transportation kept you from medical appointments, getting your medicines, non-medical meetings or appointments, work, or from getting things that you need?: No   Physical Activity: Not on file   Interpersonal Safety: Low Risk  (10/9/2023)    Interpersonal Safety     Do you feel physically and emotionally safe where you currently live?: Yes     Within the past 12 months, have you been hit, slapped, kicked or otherwise physically hurt by someone?: No     Within the past 12 months, have you been humiliated or emotionally abused in other ways by your partner or ex-partner?: No   Stress: Not on file   Social Connections: Not on file       Do you have a primary care provider? Yes    Confirmed red Flags identified and reviewed by Clinical Staff? Yes    Introduced and provided medical health record? Yes    Any resources you need Discharge/home successful? No    Patient accept CC? No    Met with patient at bedside in [facility] ED. Offered Care Coordination Program. Patient declined. No further outreach or follow up will be conducted by [facility] Transitions [facility] Community Health Worker     Trisha Cobos  Community Health Worker  [facility] Transitions Program  [facility]  [email or web address]  Office: [phone]

## 2023-11-10 NOTE — MEDICATION SCRIBE - ADMISSION MEDICATION HISTORY
Medication Scribe Admission Medication History    Admission medication history is complete. The information provided in this note is only as accurate as the sources available at the time of the update.    Information Source(s): Patient and CareEverywhere/SureScripts via in-person    Pertinent Information: n/a    Changes made to PTA medication list:  Added: None  Deleted: augmentin, Wellbutrin, flonase, prednisone, xarelto, spacer for inh, chantix  Changed: None    Medication Affordability:  Not including over the counter (OTC) medications, was there a time in the past 3 months when you did not take your medications as prescribed because of cost?: No    Allergies reviewed with patient and updates made in EHR: yes    Medication History Completed By: ANABELLA SPICER 11/10/2023 2:45 PM    PTA Med List   Medication Sig Last Dose    albuterol (PROAIR HFA) 108 (90 Base) MCG/ACT inhaler Inhale 2 puffs into the lungs every 4 hours as needed for shortness of breath 11/10/2023 at 0900    Cholecalciferol (VITAMIN D3) 75 MCG (3000 UT) TABS Take 75 mcg by mouth daily 11/10/2023 at am    COMPRESSION STOCKINGS 1 each daily Wear daily size large, two pair 11/10/2023 at am    Fluticasone-Umeclidin-Vilanterol (TRELEGY ELLIPTA) 200-62.5-25 MCG/ACT oral inhaler INHALE ONE PUFF BY MOUTH ONCE DAILY 11/10/2023 at am    ibuprofen (ADVIL/MOTRIN) 200 MG tablet Take 800 mg by mouth every 8 hours as needed for pain 11/10/2023 at 0500    ipratropium - albuterol 0.5 mg/2.5 mg/3 mL (DUONEB) 0.5-2.5 (3) MG/3ML neb solution Take 1 vial (3 mLs) by nebulization every 4 hours as needed for shortness of breath, wheezing or cough 11/5/2023 at pm    phentermine (ADIPEX-P) 37.5 MG capsule Take 1 capsule (37.5 mg) by mouth every morning 11/10/2023 at am

## 2023-11-10 NOTE — ED PROVIDER NOTES
History     Chief Complaint   Patient presents with    Back Pain     HPI  Velma Rizvi is a 57 year old female who presents emergency department secondary to low back pain.  This started about 5 days ago without injury.  She has decreased range of motion with bending at the waist.  She has not had any urinary or bowel incontinence or numbness.  Pain is made worse by bending forward mainly.  Is located across the low back.  Intermittently she will get some shooting pains down the back of the leg down to the level of the knee but not further.  When she bends at the waist that does not necessarily provoke that.  She has no pain over her buttocks.  She is never experienced this kind of discomfort before.  She tolerated Tylenol and ibuprofen without relief.  She has never had muscle relaxers before.  No dysuria hematuria, abdominal pain, increased urinary frequency.  She states she has had a history of urinary tract infection that was asymptomatic previously.    Allergies:  No Known Allergies    Problem List:    Patient Active Problem List    Diagnosis Date Noted    COPD, severe (H) 2019     Priority: Medium    Peripheral edema 2018     Priority: Medium    Obesity, Class I, BMI 30-34.9 2017     Priority: Medium    Varicose veins of legs 2011     Priority: Medium     See abnormal US study.      Tobacco use disorder 2008     Priority: Medium        Past Medical History:    Past Medical History:   Diagnosis Date    Phlebitis and thrombophlebitis 10/12/2012    PONV (postoperative nausea and vomiting)        Past Surgical History:    Past Surgical History:   Procedure Laterality Date    COLONOSCOPY N/A 2018    Procedure: COLONOSCOPY;  COLONOSCOPY;  Surgeon: Paresh Curran MD;  Location:  GI    HC TREATMENT INCOMPLETE  SURG, ANY TRIMESTER      D & C  s/p miscarriage    LAPAROSCOPIC CHOLECYSTECTOMY N/A 2019    Procedure: LAPAROSCOPIC CHOLECYSTECTOMY;  Surgeon:  "Rob Gonzales, DO;  Location:  OR    [facility] NONSPECIFIC PROCEDURE      Right hand surgery- tendon repair       Family History:    Family History   Problem Relation Age of Onset    Cancer Mother          - lung cancer with mets.    Respiratory Father         asthma    Lung Cancer Brother         Smoker    Hypertension Brother     Diabetes Brother     Cancer Maternal Grandmother         breast- 48 fatal age 51    Hypertension Son     Diabetes Maternal Uncle        Social History:  Marital Status:   [2]  Social History     Tobacco Use    Smoking status: Former     Packs/day: 1.00     Years: 37.00     Additional pack years: 0.00     Total pack years: 37.00     Types: Cigarettes     Start date: 1985     Quit date: 2023     Years since quittin.7     Passive exposure: Never    Smokeless tobacco: Never    Tobacco comments:     already quit   Vaping Use    Vaping Use: Never used   Substance Use Topics    Alcohol use: Not Currently     Comment: rarely    Drug use: No        Medications:    albuterol (PROAIR HFA) 108 (90 Base) MCG/ACT inhaler  Cholecalciferol (VITAMIN D3) 75 MCG (3000 UT) TABS  COMPRESSION STOCKINGS  cyclobenzaprine (FLEXERIL) 10 MG tablet  Fluticasone-Umeclidin-Vilanterol (TRELEGY ELLIPTA) 200-62.5-25 MCG/ACT oral inhaler  HYDROcodone-acetaminophen (NORCO) 5-325 MG tablet  ibuprofen (ADVIL/MOTRIN) 200 MG tablet  ipratropium - albuterol 0.5 mg/2.5 mg/3 mL (DUONEB) 0.5-2.5 (3) MG/3ML neb solution  methylPREDNISolone (MEDROL DOSEPAK) 4 MG tablet therapy pack  ondansetron (ZOFRAN ODT) 4 MG ODT tab  phentermine (ADIPEX-P) 37.5 MG capsule          Review of Systems   All other systems reviewed and are negative.      Physical Exam   BP: (!) 146/93  Pulse: 86  Temp: 98.1  F (36.7  C)  Resp: 16  Height: 167.6 cm (5' 6\")  Weight: 93 kg (205 lb)  SpO2: 98 %      Physical Exam  Vitals and nursing note reviewed.   Constitutional:       General: She is not in acute distress.     " Appearance: Normal appearance. She is well-developed.   HENT:      Head: Normocephalic and atraumatic.   Eyes:      General: No scleral icterus.     Conjunctiva/sclera: Conjunctivae normal.   Cardiovascular:      Rate and Rhythm: Normal rate and regular rhythm.   Pulmonary:      Effort: Pulmonary effort is normal. No respiratory distress.   Abdominal:      General: Abdomen is flat.   Musculoskeletal:      Cervical back: Normal range of motion and neck supple.      Comments: Decreased range of motion with bending at the waist.  Patient has difficulty getting into the bed.  Straight leg raise is negative for radiculopathy.  Normal sensation.  Limited range of motion with hip flexion and crossing her legs.   Skin:     General: Skin is warm and dry.      Findings: No rash.   Neurological:      General: No focal deficit present.      Mental Status: She is alert and oriented to person, place, and time.   Psychiatric:         Mood and Affect: Mood normal.         ED Course                 Procedures           Results for orders placed or performed during the hospital encounter of 11/10/23 (from the past 24 hour(s))   UA with Microscopic reflex to Culture    Specimen: Urine, Midstream   Result Value Ref Range    Color Urine Straw Colorless, Straw, Light Yellow, Yellow    Appearance Urine Clear Clear    Glucose Urine Negative Negative mg/dL    Bilirubin Urine Negative Negative    Ketones Urine Negative Negative mg/dL    Specific Gravity Urine 1.008 1.003 - 1.035    Blood Urine Negative Negative    pH Urine 7.0 5.0 - 7.0    Protein Albumin Urine Negative Negative mg/dL    Urobilinogen Urine Normal Normal, 2.0 mg/dL    Nitrite Urine Negative Negative    Leukocyte Esterase Urine Trace (A) Negative    Bacteria Urine Few (A) None Seen /HPF    Mucus Urine Present (A) None Seen /LPF    RBC Urine 1 <=2 /HPF    WBC Urine 1 <=5 /HPF    Squamous Epithelials Urine 1 <=1 /HPF    Narrative    Urine Culture not indicated       Medications  - No data to display    Assessments & Plan (with Medical Decision Making)  57-year-old female with low back pain.  She went to be tested for urinary tract infection.  We discussed the fact that urinary tract infection would not give her difficulty with bending at the waist twisting and turning so she had a UTI would be in addition to muscular back pain.  I am not seeing signs of radiculopathy or significant sciatica.  She would not need a urgent MRI.  No numbness or tingling.  I discussed the course of this with the patient including the slow recovery.  If no improvement she will need physical therapy and/or MRI.  She declined IM pain medications while she was here.  She will be sent home with prescriptions for Norco and Medrol Dosepak, Zofran and Flexeril.  I warned her regarding the sedative effects and other side effects of the narcotics and the muscle relaxer.  I advised her to take 1/2 tablet of the muscle relaxer first.  She can take the Zofran with the Norco to prevent nausea.  She understands and agrees with this plan.  Return ER precautions and fall precautions discussed.  All questions answered prior to discharge.     I have reviewed the nursing notes.    I have reviewed the findings, diagnosis, plan and need for follow up with the patient.          New Prescriptions    CYCLOBENZAPRINE (FLEXERIL) 10 MG TABLET    Take 0.5 tablets (5 mg) by mouth 3 times daily as needed for muscle spasms    HYDROCODONE-ACETAMINOPHEN (NORCO) 5-325 MG TABLET    Take 1 tablet by mouth every 6 hours as needed for severe pain    METHYLPREDNISOLONE (MEDROL DOSEPAK) 4 MG TABLET THERAPY PACK    Follow Package Directions    ONDANSETRON (ZOFRAN ODT) 4 MG ODT TAB    Take 1 tablet (4 mg) by mouth every 8 hours as needed for nausea       Final diagnoses:   Acute bilateral low back pain without sciatica       11/10/2023   [facility] EMERGENCY DEPT       Vance Lomeli MD  11/10/23 4352

## 2023-11-10 NOTE — DISCHARGE INSTRUCTIONS
Return to the ER if new or worsening symptoms.  Take the pain medicines and muscle actions as advised.  They can make you sleepy so use caution when taking them.  Take the Medrol Dosepak as that might help with inflammation.  If no improvement you may need to have physical therapy.  Please make a follow-up appointment with your doctor.  No driving if taking the pain pills or muscle relaxers.  Do the gentle pelvic tilt exercises as discussed and then move onto further range of motion and stretching exercises as you get better.  It was a pleasure to meet you.  I hope you get over this quickly.

## 2023-11-10 NOTE — ED TRIAGE NOTES
Patient c/o low back pain x6 days. Pain radiates down right leg intermittently. No known injury.     Triage Assessment (Adult)       Row Name 11/10/23 1257          Triage Assessment    Airway WDL WDL        Respiratory WDL    Respiratory WDL WDL        Skin Circulation/Temperature WDL    Skin Circulation/Temperature WDL WDL        Peripheral/Neurovascular WDL    Peripheral Neurovascular WDL WDL

## 2023-11-29 DIAGNOSIS — J44.9 COPD, SEVERE (H): ICD-10-CM

## 2023-11-29 RX ORDER — FLUTICASONE FUROATE, UMECLIDINIUM BROMIDE AND VILANTEROL TRIFENATATE 200; 62.5; 25 UG/1; UG/1; UG/1
1 POWDER RESPIRATORY (INHALATION) DAILY
Qty: 60 EACH | Refills: 2 | Status: SHIPPED | OUTPATIENT
Start: 2023-11-29 | End: 2024-02-20

## 2023-12-07 DIAGNOSIS — E66.812 CLASS 2 OBESITY WITHOUT SERIOUS COMORBIDITY WITH BODY MASS INDEX (BMI) OF 35.0 TO 35.9 IN ADULT, UNSPECIFIED OBESITY TYPE: ICD-10-CM

## 2023-12-07 RX ORDER — PHENTERMINE HYDROCHLORIDE 37.5 MG/1
37.5 CAPSULE ORAL EVERY MORNING
Qty: 30 CAPSULE | Refills: 0 | Status: SHIPPED | OUTPATIENT
Start: 2023-12-07 | End: 2024-01-21

## 2023-12-18 ENCOUNTER — OFFICE VISIT (OUTPATIENT)
Dept: FAMILY MEDICINE | Facility: OTHER | Age: 58
End: 2023-12-18
Payer: COMMERCIAL

## 2023-12-18 ENCOUNTER — ANCILLARY PROCEDURE (OUTPATIENT)
Dept: GENERAL RADIOLOGY | Facility: OTHER | Age: 58
End: 2023-12-18
Attending: PHYSICIAN ASSISTANT
Payer: COMMERCIAL

## 2023-12-18 VITALS
RESPIRATION RATE: 20 BRPM | DIASTOLIC BLOOD PRESSURE: 88 MMHG | TEMPERATURE: 98.8 F | HEIGHT: 66 IN | HEART RATE: 82 BPM | BODY MASS INDEX: 32.62 KG/M2 | WEIGHT: 203 LBS | OXYGEN SATURATION: 98 % | SYSTOLIC BLOOD PRESSURE: 128 MMHG

## 2023-12-18 DIAGNOSIS — E66.812 CLASS 2 OBESITY WITHOUT SERIOUS COMORBIDITY WITH BODY MASS INDEX (BMI) OF 35.0 TO 35.9 IN ADULT, UNSPECIFIED OBESITY TYPE: Primary | ICD-10-CM

## 2023-12-18 DIAGNOSIS — M25.532 LEFT WRIST PAIN: ICD-10-CM

## 2023-12-18 PROCEDURE — 99214 OFFICE O/P EST MOD 30 MIN: CPT | Performed by: PHYSICIAN ASSISTANT

## 2023-12-18 PROCEDURE — 73110 X-RAY EXAM OF WRIST: CPT | Mod: TC | Performed by: RADIOLOGY

## 2023-12-18 ASSESSMENT — PAIN SCALES - GENERAL: PAINLEVEL: NO PAIN (0)

## 2023-12-18 NOTE — PROGRESS NOTES
Assessment & Plan     Class 2 obesity without serious comorbidity with body mass index (BMI) of 35.0 to 35.9 in adult, unspecified obesity type  We will see if we can get Phentermine-Topiramate covered, or if not covered if it is cheap enough.  Hopefully the lower dose of phentermine with addition of topiramate helps reduce her side effects. We did discuss potential side effects of topiramate as well.   If not covered we will go back to Phentermine 30 mg once daily.   Will send her information for her goal calories, would like her to do more active logging on an gareth so we can review her diet. At this point she is using medication to suppress appetite only without much adjustment to diet/exercise. She does have a lot of stress with holidays and her  having had a heart attack so hopefully after he gets through his procedure and the holidays she can focus more on this.   Follow-up in 1-2 months.   - Phentermine-Topiramate 3.75-23 MG CP24; Take 1 tablet by mouth daily    Left wrist pain  Suspect tenosynovitis issue.  Recommended bracing during the day not so much at night.  Topical Diclofenac for pain relief.  Consider OT referral. Did obtain x-ray to evaluate for bony concerns including OA.   - XR Wrist Left G/E 3 Views; Future    Options for treatment and follow-up care were reviewed with the patient and/or guardian. Patient and/or guardian engaged in the decision making process and verbalized understanding of the options discussed and agreed with the final plan.      SATYA Rich [facility] RYLAN Carreon is a 58 year old, presenting for the following health issues:  Recheck Medication      12/18/2023     4:49 PM   Additional Questions   Roomed by Bridgette   Accompanied by Self         12/18/2023     4:49 PM   Patient Reported Additional Medications   Patient reports taking the following new medications NA       History of Present Illness       Reason for visit:  To  "discuss my diet pills    She eats 2-3 servings of fruits and vegetables daily.She consumes 0 sweetened beverage(s) daily.She exercises with enough effort to increase her heart rate 20 to 29 minutes per day.  She exercises with enough effort to increase her heart rate 3 or less days per week.   She is taking medications regularly.         COPD Follow-Up  Overall, how are your COPD symptoms since your last clinic visit?  Slightly worse  How much fatigue or shortness of breath do you have when you are walking?  Same as usual  How much shortness of breath do you have when you are resting?  None  How often do you cough? Sometimes  Have you noticed any change in your sputum/phlegm?  No  Have you experienced a recent fever? No  Please describe how far you can walk without stopping to rest:  2-5 blocks  How many flights of stairs are you able to walk up without stopping?  3 or more  Have you had any Emergency Room Visits, Urgent Care Visits, or Hospital Admissions because of your COPD since your last office visit?  No    History   Smoking Status    Former    Packs/day: 1.00    Years: 37.00    Types: Cigarettes    Start date: 5/1/1985    Quit date: 2/20/2023   Smokeless Tobacco    Never     No results found for: \"FEV1\", \"QLV3BPH\"    Weight loss medication follow up  - No changes.   - Admits holiday has been hard.   - has had some issues with constipation. She isn't sure if that is due to protein.   - Has felt nauseated with the higher dose.     Review of Systems   Constitutional, HEENT, cardiovascular, pulmonary, GI, , musculoskeletal, neuro, skin, endocrine and psych systems are negative, except as otherwise noted.      Objective    /88   Pulse 82   Temp 98.8  F (37.1  C) (Temporal)   Resp 20   Ht 1.665 m (5' 5.55\")   Wt 92.1 kg (203 lb)   LMP 03/14/2017 (Exact Date)   SpO2 98%   BMI 33.22 kg/m    Body mass index is 33.22 kg/m .  Physical Exam   GENERAL: healthy, alert and no distress  RESP: lungs clear to " auscultation - no rales, rhonchi or wheezes  CV: regular rate and rhythm, normal S1 S2, no S3 or S4, no murmur, click or rub, no peripheral edema and peripheral pulses strong  MS: no gross musculoskeletal defects noted, no edema.  Left wrist - full passive ROM, there is tenderness over the distal radius, Finkelstein's positive.    SKIN: no suspicious lesions or rashes  PSYCH: mentation appears normal, affect normal/bright    No results found for any visits on 12/18/23.

## 2023-12-20 ENCOUNTER — HOSPITAL ENCOUNTER (OUTPATIENT)
Dept: CT IMAGING | Facility: CLINIC | Age: 58
Discharge: HOME | End: 2023-12-20
Attending: PHYSICIAN ASSISTANT | Admitting: PHYSICIAN ASSISTANT
Payer: COMMERCIAL

## 2023-12-20 DIAGNOSIS — Z87.891 PERSONAL HISTORY OF TOBACCO USE: ICD-10-CM

## 2023-12-20 DIAGNOSIS — R91.8 PULMONARY NODULES: Primary | ICD-10-CM

## 2023-12-20 PROCEDURE — 71271 CT THORAX LUNG CANCER SCR C-: CPT

## 2023-12-20 NOTE — RESULT ENCOUNTER NOTE
Velma,    Your primary care provider is currently out of the office, I am one of the covering providers while they are out.    There is a lung finding on the right upper lung that they recommend to evaluate further.  I did place an order for a repeat CT scan to look at this more specifically.  I will also route this to your PCP so they are aware.  Once this a CT scan is done, we can potentially fully characterize this and take next steps thereafter.      If you have any questions feel free to call the clinic at [phone].      Thank you,    Richy Goff MD

## 2023-12-21 ENCOUNTER — HOSPITAL ENCOUNTER (OUTPATIENT)
Dept: CT IMAGING | Facility: CLINIC | Age: 58
Discharge: HOME | End: 2023-12-21
Attending: STUDENT IN AN ORGANIZED HEALTH CARE EDUCATION/TRAINING PROGRAM | Admitting: STUDENT IN AN ORGANIZED HEALTH CARE EDUCATION/TRAINING PROGRAM
Payer: COMMERCIAL

## 2023-12-21 DIAGNOSIS — R91.8 PULMONARY NODULES: ICD-10-CM

## 2023-12-21 PROCEDURE — 250N000009 HC RX 250: Performed by: STUDENT IN AN ORGANIZED HEALTH CARE EDUCATION/TRAINING PROGRAM

## 2023-12-21 PROCEDURE — 250N000011 HC RX IP 250 OP 636: Performed by: STUDENT IN AN ORGANIZED HEALTH CARE EDUCATION/TRAINING PROGRAM

## 2023-12-21 PROCEDURE — 71260 CT THORAX DX C+: CPT

## 2023-12-21 RX ORDER — IOPAMIDOL 755 MG/ML
500 INJECTION, SOLUTION INTRAVASCULAR ONCE
Status: COMPLETED | OUTPATIENT
Start: 2023-12-21 | End: 2023-12-21

## 2023-12-21 RX ADMIN — SODIUM CHLORIDE 60 ML: 9 INJECTION, SOLUTION INTRAVENOUS at 16:11

## 2023-12-21 RX ADMIN — IOPAMIDOL 80 ML: 755 INJECTION, SOLUTION INTRAVENOUS at 16:11

## 2023-12-26 ENCOUNTER — TELEPHONE (OUTPATIENT)
Dept: FAMILY MEDICINE | Facility: OTHER | Age: 58
End: 2023-12-26
Payer: COMMERCIAL

## 2023-12-26 NOTE — TELEPHONE ENCOUNTER
Patient is waiting to hear the results of her chest CT.   Please advise regarding any needed follow up  Nyasia Foster MA on 12/26/2023 at 3:11 PM      CT CHEST WITH CONTRAST 12/21/2023 4:19 PM     CLINICAL HISTORY: Recent lung cancer screening with low-dose CT  -spiculated linear lesion upper middle lobe lung rads 4B; Pulmonary  nodules.     TECHNIQUE: CT chest with IV contrast. Multiplanar reformats were  obtained. Dose reduction techniques were used.     CONTRAST: Isovue-370, 80mL     COMPARISON: 12/20/2023, 12/21/2022     FINDINGS:   LUNGS AND PLEURA: The recently described irregular spiculated linear  opacity in the lateral right upper lobe is unchanged, previously  measuring 3.1 x 5.6 x 1.2 cm. As previously mentioned, this opacity  could represent pleural-parenchymal scarring versus an infectious,  inflammatory, or neoplastic process. Upper lobe predominant  centrilobular and paraseptal pulmonary emphysema are present.  Previously seen subtle subsolid nodularity and groundglass attenuation  in the left lower lobe as well as mild irregular nodularity in the  lingular segment of the left upper lobe are similar as well. No new or  progressive interstitial airspace disease. No pleural fluid. Central  airways are patent.     MEDIASTINUM/AXILLAE: Heart size is normal. No pericardial effusion.  Thoracic aorta is normal in course and caliber. Minimal coronary  artery atherosclerosis. No enlarged thoracic lymph nodes.     UPPER ABDOMEN: Gallbladder is absent. Similar-appearing lobulated cyst  in the upper pole of the left kidney is again noted. No follow-up is  necessary.     MUSCULOSKELETAL: Unremarkable.                                                                      IMPRESSION:   1.  Indeterminate spiculated somewhat linear opacity in the right  upper lobe. Consider further evaluation with PET/CT and/or tissue  sampling versus follow-up chest CT in 3 months.  2.  Indeterminate subtle nodularity and  groundglass attenuation in the  left lower lobe and lingula. Recommend attention on follow-up.  3.  Pulmonary emphysema.     BENNY DYE MD

## 2023-12-26 NOTE — TELEPHONE ENCOUNTER
Notified patient of the results. The findings on the CT correlate with her treated pneumonia she had in November.  Suspect this is still from the infection and inflammation. She did note having some yellow phlegm recently but no fevers/chills, shortness of breat or new cough.  She will monitor for these symptoms in the meantime and we discussed rechecking CT in 3 months for changes and if any concerns or persistent findings we will consider sampling at that time.      We did discuss coronary atherosclerosis. Her ASCVD risk score is 1.9% so we will monitor lipids yearly.     Shahid Hong PA-C

## 2024-02-01 ENCOUNTER — MEDICAL CORRESPONDENCE (OUTPATIENT)
Dept: HEALTH INFORMATION MANAGEMENT | Facility: CLINIC | Age: 59
End: 2024-02-01

## 2024-02-01 ENCOUNTER — ANCILLARY PROCEDURE (OUTPATIENT)
Dept: ULTRASOUND IMAGING | Facility: CLINIC | Age: 59
End: 2024-02-01
Attending: SURGERY
Payer: COMMERCIAL

## 2024-02-01 ENCOUNTER — OFFICE VISIT (OUTPATIENT)
Dept: VASCULAR SURGERY | Facility: CLINIC | Age: 59
End: 2024-02-01
Attending: SURGERY
Payer: COMMERCIAL

## 2024-02-01 DIAGNOSIS — I83.893 VARICOSE VEINS OF BILATERAL LOWER EXTREMITIES WITH OTHER COMPLICATIONS: Primary | ICD-10-CM

## 2024-02-01 DIAGNOSIS — I82.462 ACUTE DEEP VEIN THROMBOSIS (DVT) OF CALF MUSCLE VEIN OF LEFT LOWER EXTREMITY (H): ICD-10-CM

## 2024-02-01 PROCEDURE — 99213 OFFICE O/P EST LOW 20 MIN: CPT | Performed by: SURGERY

## 2024-02-01 PROCEDURE — 93971 EXTREMITY STUDY: CPT | Mod: LT | Performed by: SURGERY

## 2024-02-01 NOTE — LETTER
2024         RE: Velma Rizvi  [address] [facility] [address]        Dear Colleague,    Thank you for referring your patient, Velma Rizvi, to the [facility] [place]. Please see a copy of my visit note below.    [facility] [place] Progress Note    Velma Rizvi presents in follow-up of extensive, left lower extremity superficial thrombophlebitis of her great saphenous vein and deep vein thrombosis of her gastrocnemius vein.  She discontinued her oral anticoagulation in 2023 and has not had any recurrent symptoms.    She returned for a left lower extremity venous competency study today.    Physical Exam  General: Pleasant female in no acute distress.  Blood pressure 146/93, pulse 90  Extremities: Left lower extremity: Mild hyperpigmentation and induration of the mid thigh with some puckering of the skin, consistent with superficial phlebitis of a varicosity coursing from the great saphenous vein is noted on ultrasound    4 to 6 mm varicosities on the posterior medial left calf extending posteriorly.  These do not appear to be originating from the great saphenous vein    Ultrasound:  Name:  Velma Rizvi                                             Patient ID: [id number]  Date: 2024                                               : 1965  Sex: female                                                                 Examined by: TERESA Sousa RVT  Age:  58 year old                                                         Reading MD: CHASE Morris MD     INDICATION:  Acute DVT of the left gastrocnemius veins     EXAM TYPE  LEFT LOWER EXTREMITY VENOUS DUPLEX FOR VENOUS INSUFFICIENCY TECHNICAL SUMMARY     A duplex ultrasound study using color flow was performed, to evaluate the left lower extremity veins for valvular incompetence with the patient in a steep reversed trendelenberg.      LEFT:     The deep veins  demonstrate phasic flow, compress and respond to augmentations.  There is no DVT.  The common femoral vein is incompetent and free of thrombus. The remaining deep veins are competent and free of thrombus.      Chronic non-occlusive thrombus is visualized in the GSV at the mid thigh.  The GSV demonstrates phasic flow, compresses and responds to augmentations from the saphenofemoral junction to proximal thigh, and from the distal thigh to ankle with no evidence of thrombus. The great saphenous vein measures 8.7 mm at the saphenofemoral junction, 5.5 mm at the proximal thigh, and 4.5 mm at the knee. The GSV is incompetent from SFJ to Proximal Calf and the Distal Calf, with the greatest reflux time of 9882 milliseconds.  The GSV gives rise to multiple incompetent varicose veins, the largest measures 7.1 mm off the Proximal Calf that courses Posteromedial with a reflux time of 6549 milliseconds.  Chronic non-occlusive thrombus extends from the mid thigh GSV into the medial thigh varicose veins.     The AASV is not visualized.      The Giacomini vein is competent ( 1.9 mm) communicating with the small saphenous vein at the knee level.     The SSV demonstrates phasic flow, compresses and responds to augmentations from the popliteal space to the ankle.  No reflux or thrombus is seen. The saphenopopliteal junction is absent.      Perforators: There is an incompetent  vein ( 5.9 mm) at the mid posterior calf approximately 22 cm from medial mallelous that communicates with varicose veins.      RIGHT:     The CFV demonstrate phasic flow, compress and respond to augmentations.  There is no DVT.       FINAL SUMMARY:  Left lower extremity:  Deep veins  1.  No deep vein thrombosis.  The gastrocnemius vein thrombosis noted on previous exam is no longer visualized  2.  Common femoral vein incompetence.  The remaining deep vein valves are competent     Superficial veins  1.  Chronic, nonocclusive thrombus mid thigh great  saphenous vein extending into varicose veins on the thigh.  The remaining great saphenous vein is free of thrombus  2.  Great saphenous vein is incompetent from the saphenofemoral junction to the ankle with the exception of the mid calf.  Is a source of multiple, incompetent varicosities on the thigh and calf      veins  Incompetent  posterior medial calf 22 cm from medial malleolus supplying a varicosity on the posterior calf     Right lower extremity:  Normal phasicity, compression and augmentation in the common femoral vein with no evidence of deep vein thrombosis     Incompetence Criteria: Greater than 500 milliseconds reflux in the superficial and  veins and greater than 1000 milliseconds reflux in the deep veins.     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  The gastrocnemius vein thrombosis has resolved and is no longer visualized.    The left great saphenous vein has recanalized, measures 5.5 mm in diameter in the proximal thigh with reflux time of 9.3 seconds and is the source of incompetent varicosities in the thigh measuring 4.9 mm in diameter with reflux time of 6.1 seconds and smaller varicosities on the calf.  We discussed options of continued conservative measures with risks of probably 25 to 30% superficial thrombophlebitis, progression of the disease process and the low risk of hemorrhage.    She is a candidate for radiofrequency ablation of the left great saphenous vein in the office setting.  Details procedure including risks of bleeding, infection, deep vein thrombosis and nerve injury were discussed.  The patient voiced understanding.  She specifically does not want cyanoacrylate glue, sharing that one of her coworkers has had much pain following glue ablation of the vein 3 months postop.  I reassured her that this  kind of pain is distinctly uncommon with radiofrequency ablation.  The most common complication is of numbness of the anteromedial leg between the knee and the distal leg.  She voiced understanding and her questions were answered.    Plan:  Submit for insurance coverage for radiofrequency ablation left great saphenous vein alone.  Branch tributaries will not be addressed.  She is fully aware that the varicosities on the posterior medial calf may not resolve completely as they are communicating with an incompetent  which will not be treated.    Torres Morris MD, FACS    Dictated using Dragon voice recognition software which may result in transcription errors            February 1, 2024    Vein Procedure Recommendation    Spoke with patient in clinic.    Dr. Morris has recommended patient to have the following vein procedure(s):     1. Left leg VNUS closure GSV      Patient Pre-op Questions:  Preferred Pharmacy: [facility] in [place]  Anticoagulant/ASA: No  Artificial Joint or Heart Valve:  No  Open ulcer:  No  Sedation nausea: No      Patient is recommended to wear Thigh High compression hose following her procedure. Discussed compression hose. Explained to patient if insurance doesn't cover the compression hose there are a couple different options to getting them and to call the clinic to let us know if they need help.**PATIENT ORDERING ON OWN**    Handed patient written procedure instructions to review on her own (see After Visit Summary).    Next steps:    Insurance Submission  Informed patient this process could take up to 14 business days, but once approved, the patient will be contacted by our surgery scheduler to schedule the above procedure. Gave patient our surgery scheduler's information.    Patient is in agreement with all of the above and has no further questions at this time.    Julia Porter RN  [facility]  [facility] Clinic      Again, thank you for allowing me to  participate in the care of your patient.        Sincerely,        Torres Morris MD

## 2024-02-01 NOTE — PROGRESS NOTES
February 1, 2024    Vein Procedure Recommendation    Spoke with patient in clinic.    Dr. Morris has recommended patient to have the following vein procedure(s):     1. Left leg VNUS closure GSV      Patient Pre-op Questions:  Preferred Pharmacy: [facility] in [place]  Anticoagulant/ASA: No  Artificial Joint or Heart Valve:  No  Open ulcer:  No  Sedation nausea: No      Patient is recommended to wear Thigh High compression hose following her procedure. Discussed compression hose. Explained to patient if insurance doesn't cover the compression hose there are a couple different options to getting them and to call the clinic to let us know if they need help.**PATIENT ORDERING ON OWN**    Handed patient written procedure instructions to review on her own (see After Visit Summary).    Next steps:    Insurance Submission  Informed patient this process could take up to 14 business days, but once approved, the patient will be contacted by our surgery scheduler to schedule the above procedure. Gave patient our surgery scheduler's information.    Patient is in agreement with all of the above and has no further questions at this time.    Julia Porter RN  [facility]  Vein Clinic

## 2024-02-01 NOTE — PROGRESS NOTES
[facility] [place] Progress Note    Velma Rizvi presents in follow-up of extensive, left lower extremity superficial thrombophlebitis of her great saphenous vein and deep vein thrombosis of her gastrocnemius vein.  She discontinued her oral anticoagulation in 2023 and has not had any recurrent symptoms.    She returned for a left lower extremity venous competency study today.    Physical Exam  General: Pleasant female in no acute distress.  Blood pressure 146/93, pulse 90  Extremities: Left lower extremity: Mild hyperpigmentation and induration of the mid thigh with some puckering of the skin, consistent with superficial phlebitis of a varicosity coursing from the great saphenous vein is noted on ultrasound    4 to 6 mm varicosities on the posterior medial left calf extending posteriorly.  These do not appear to be originating from the great saphenous vein    Ultrasound:  Name:  Velma Rizvi                                             Patient ID: [id number]  Date: 2024                                               : 1965  Sex: female                                                                 Examined by: TERESA Sousa RVT  Age:  58 year old                                                         Reading MD: CHASE Morris MD     INDICATION:  Acute DVT of the left gastrocnemius veins     EXAM TYPE  LEFT LOWER EXTREMITY VENOUS DUPLEX FOR VENOUS INSUFFICIENCY TECHNICAL SUMMARY     A duplex ultrasound study using color flow was performed, to evaluate the left lower extremity veins for valvular incompetence with the patient in a steep reversed trendelenberg.      LEFT:     The deep veins demonstrate phasic flow, compress and respond to augmentations.  There is no DVT.  The common femoral vein is incompetent and free of thrombus. The remaining deep veins are competent and free of thrombus.      Chronic non-occlusive thrombus is visualized in the GSV at  the mid thigh.  The GSV demonstrates phasic flow, compresses and responds to augmentations from the saphenofemoral junction to proximal thigh, and from the distal thigh to ankle with no evidence of thrombus. The great saphenous vein measures 8.7 mm at the saphenofemoral junction, 5.5 mm at the proximal thigh, and 4.5 mm at the knee. The GSV is incompetent from SFJ to Proximal Calf and the Distal Calf, with the greatest reflux time of 9882 milliseconds.  The GSV gives rise to multiple incompetent varicose veins, the largest measures 7.1 mm off the Proximal Calf that courses Posteromedial with a reflux time of 6549 milliseconds.  Chronic non-occlusive thrombus extends from the mid thigh GSV into the medial thigh varicose veins.     The AASV is not visualized.      The Giacomini vein is competent ( 1.9 mm) communicating with the small saphenous vein at the knee level.     The SSV demonstrates phasic flow, compresses and responds to augmentations from the popliteal space to the ankle.  No reflux or thrombus is seen. The saphenopopliteal junction is absent.      Perforators: There is an incompetent  vein ( 5.9 mm) at the mid posterior calf approximately 22 cm from medial mallelous that communicates with varicose veins.      RIGHT:     The CFV demonstrate phasic flow, compress and respond to augmentations.  There is no DVT.       FINAL SUMMARY:  Left lower extremity:  Deep veins  1.  No deep vein thrombosis.  The gastrocnemius vein thrombosis noted on previous exam is no longer visualized  2.  Common femoral vein incompetence.  The remaining deep vein valves are competent     Superficial veins  1.  Chronic, nonocclusive thrombus mid thigh great saphenous vein extending into varicose veins on the thigh.  The remaining great saphenous vein is free of thrombus  2.  Great saphenous vein is incompetent from the saphenofemoral junction to the ankle with the exception of the mid calf.  Is a source of multiple,  incompetent varicosities on the thigh and calf      veins  Incompetent  posterior medial calf 22 cm from medial malleolus supplying a varicosity on the posterior calf     Right lower extremity:  Normal phasicity, compression and augmentation in the common femoral vein with no evidence of deep vein thrombosis     Incompetence Criteria: Greater than 500 milliseconds reflux in the superficial and  veins and greater than 1000 milliseconds reflux in the deep veins.     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  The gastrocnemius vein thrombosis has resolved and is no longer visualized.    The left great saphenous vein has recanalized, measures 5.5 mm in diameter in the proximal thigh with reflux time of 9.3 seconds and is the source of incompetent varicosities in the thigh measuring 4.9 mm in diameter with reflux time of 6.1 seconds and smaller varicosities on the calf.  We discussed options of continued conservative measures with risks of probably 25 to 30% superficial thrombophlebitis, progression of the disease process and the low risk of hemorrhage.    She is a candidate for radiofrequency ablation of the left great saphenous vein in the office setting.  Details procedure including risks of bleeding, infection, deep vein thrombosis and nerve injury were discussed.  The patient voiced understanding.  She specifically does not want cyanoacrylate glue, sharing that one of her coworkers has had much pain following glue ablation of the vein 3 months postop.  I reassured her that this kind of pain is distinctly uncommon with radiofrequency ablation.  The most common complication is of numbness of the anteromedial leg between the knee and the distal leg.  She voiced understanding and her questions were answered.    Plan:  Submit for insurance  coverage for radiofrequency ablation left great saphenous vein alone.  Branch tributaries will not be addressed.  She is fully aware that the varicosities on the posterior medial calf may not resolve completely as they are communicating with an incompetent  which will not be treated.    Torres Morris MD, FACS    Dictated using Dragon voice recognition software which may result in transcription errors

## 2024-02-01 NOTE — PATIENT INSTRUCTIONS
Pre-Procedure Instructions:                                        VNUS Closure   You are having a VNUS Closure. One or more of your veins will be closed with radiofrequency heating.    Insurance  Precertification and/or referral authorization may be required by your insurance company.  We will call your insurance company to verify benefits for the medically necessary part of your procedure.    Your Current Medications and Allergies  Are you on blood thinner medications? (Aspirin, Plavix, Coumadin, Eliquis, Xarelto) Please discuss this with your surgeon.  Are you sensitive to latex or adhesives used for fake fingernails? Please let us know!     Driving Escort and   Please arrange to have a trusted adult (18 years old or older) drive you to and from the clinic.  For your safety, we recommend you have a trusted adult to stay with you until the next morning.    Your Health  If you have a change in your health before the procedure, contact our office immediately.  (For example: cold symptoms, cough, urinary tract infection, fever, flu symptoms.)  A pre-procedure physical is not required.    Note  It is sometimes necessary to adjust the procedure schedule due to emergencies. We greatly appreciate your flexibility and understanding in this matter.  ____________________________________________________________________________________  Check List:  The Morning of Your Procedure  ___1.  Please do not apply anything on your leg(s) or shave the day of your procedure.  ___2.  You may take your normal medications the day of your procedure.  ___3.  It is recommended you eat a light breakfast or lunch on the day of your procedure.  ___4.  Wear comfortable loose-fitting clothing and wide-fitting shoes (i.e. tennis shoes, slip-ons).  ___5.  Please arrive at our clinic at the specified time given by the nurse.  ___6.  You will sign an affirmation of informed consent.  ___7.  Bring your pre-procedure sedation medication  (lorazepam and clonidine) with you to the clinic.              One hour before your procedure, you will be instructed to take these medications. The lorazepam              (Ativan) lowers anxiety and sedates you; the clonidine makes the lorazepam more effective. Everyone's              body processes these medications differently. Therefore, reactions to these medications vary. Some              people stay awake and some people sleep through the whole procedure. You may not remember              everything about the procedure or the day. You do not want to make any big decisions for the rest of the              day.         The Day of Your Procedure:                  VNUS Closure  In the Exam Room  A nurse will bring you back to an exam room with your family member or friend. This is when your informed consent will be signed, and you will take your pre-procedure medications.  You will be asked to remove everything from the waist down, including undergarments. You will then put on a hospital gown or shorts and blue booties.  Your surgeon will come in to answer any questions and israel the appropriate leg(s) with a marker.  You will be taken to the restroom to empty your bladder before going into the procedure room.    In the Procedure Room  You will be escorted to the procedure room. You will lie on a procedure table covered with a sheet or blanket.  A nurse will put a blood pressure cuff on your arm and a pulse/oxygen monitor on a finger. Your vital signs will be monitored every 15 minutes.  Your gown will be pulled up slightly and the groin exposed for a short period of time. The surgeon's assistant will clean your foot, leg, and groin with an antibacterial solution. We will get you covered up as quickly as possible!  Sterile towels and blue drapes will be used to cover you and the table. You will be asked to keep your hands under the blue drapes during the procedure.    The Procedure  The surgeon will visualize  your veins with an ultrasound machine. He or she will then numb your skin and access the vein. A catheter is passed up the vein and positioned with ultrasound guidance. The table will then be tipped head down.  Once the catheter is in the correct position, medication will be injected to numb your leg. You will feel some needle sticks and may feel discomfort as the medication goes in. Once this is done, you should not experience significant discomfort. But if you do, please let us know and more numbing medication can be injected. As the catheter sends out heat, the vein closes off and the catheter is withdrawn.    Post-Procedure  Once the procedure is done, your leg will be washed with warm water and dried. You will have one or more small bandages covering your incisions. Your compression hose will be put on or an ACE wrap from your toes to groin. If the ACE wrap feels too tight or binds, please elevate your leg and loosen it.  You will be offered something to drink and a light snack.  You will rest with your leg(s) elevated for approximately 30 minutes. Your friend or family member may join you.   For your safety, you will be accompanied to your car by a staff member.      Post-Procedure Instructions:                          VNUS Closure    Post-Op Day Zero - The Day of Your Procedure:  1. Medication for Pain Control and Inflammation Control   - The numbing medication injected during your procedure will last for several hours. The pre-procedure    tablets may make you very sleepy and you might not remember everything from the procedure or from    the day. This will usually wear off by the next day.   - Ibuprofen:  If tolerated, take ibuprofen (e.g., Advil) to reduce inflammation whether or not you have    pain. For three days, take two tablets (200mg each) with every meal and at bedtime with a snack. If    you are not able to take Ibuprofen, Tylenol is another option.   - You may resume taking any medications you  were taking before your procedure.  2. Activity   You may be up as tolerated when the sedation wears off. Elevate if possible when not walking.  3. Bandages   - You will have one or more small bandages covering the incision site(s) where we accessed the               vein(s). Keep your bandages on and dry for 48 hours. Compression hose should be worn continuously               over the bandages for the first 24 hours.  4. Incisions   - Bleeding: You may see some incision sites that are oozing through the bandages. This is not unusual    and can be managed with Rest, Ice, Compression and Elevation (RICE). Apply ice and firm pressure    directly to the site that is bleeding and rest with your leg(s) elevated above your heart for 20-30               minutes.    Post-Op Day One:  1. Medication   - Ibuprofen:  Continue the same as the Day of Your Procedure.  2. Activity   - Walk as tolerated. Resume your normal daily walking activities. If it hurts, stop. We encourage you to               walk. Elevate if possible when not walking.  3. Bandages and Compression   - After 24 hours, you may remove your compression hose to take a shower. Please keep your               bandage(s) on and intact. You may want to cover your bandage(s) to ensure it remains dry during your               shower. Reapply your compression hose after your shower and wear during waking hours only.  4. Driving   - You may resume driving when you can do so safely.    Post-Op Day Two:   1. Medication  - Ibuprofen:  Continue the same as the Day of Your Procedure.  2.  Activity   - Walk as tolerated. Elevate if possible when not walking.  3. Bandages and Compression  - You may remove your bandage after 48 hours. Continue wearing your compression hose during waking hours only for a total of seven days following your procedure.  4. Incisions   - Your leg(s) may be bruised at or near incision site(s) and possibly have numb spots. This is normal.   5. Call Us  If:   - You see any areas on your leg that are red and angry in appearance.   - You notice any drainage that is milky or cloudy in appearance or that has a foul odor.   - You run a temperature of 100.5 or greater.      Post-Op Day Three:  You will have a follow up appointment 2-4 days post-procedure. At this appointment, you will have an ultrasound and we will check your incisions.        The Two Weeks Following Your Procedure  1.  Skin Care              - Do not use any lotions, creams, or powders on your incision(s) for 14 days or until the incisions have               healed.              - Do not soak in a bathtub, hot tub or go swimming for 14 days or until your incisions have healed.  2.  Medications   - You may use ibuprofen or acetaminophen (e.g., Tylenol) as needed for pain or discomfort.  3.  Activity   - Do not lift over 25 pounds. After about two weeks you may resume exercise such as aerobics,               running, tennis or weightlifting. Use your common sense and ease back into your exercise routine              slowly.   - You may feel a cord-like tightness along the inside of your leg. Gentle stretching can be helpful.  4. Compression Hose   - Your doctor may instruct you to wear compression for longer than seven days; please    follow your doctor's instructions. As a comfort measure, you may choose to wear compression for    longer than required.  5.  Travel   - Do not fly in an airplane for 14 days after your procedure.  If you have a long car trip planned within    two to three weeks following your procedure, stop and walk for a few minutes every two hours.    Periodic ankle pumps during the ride may be helpful.    Six Week Appointment  - At your six-week appointment, you will see your surgeon for an exam and evaluation. This office visit               will be scheduled when you return for post-op day three return appointment.     Return to Work  1.  If you work outside the home, you may return  to work in a few days depending on the extent of your    procedure, how you tolerate it, and the type of work you perform.  2.  Paperwork: If your employer requires paperwork or you would like a letter written to your employer, please    let us know. We will complete disability type forms at no charge. Please allow five business days for    forms to be completed.

## 2024-02-18 ENCOUNTER — MYC MEDICAL ADVICE (OUTPATIENT)
Dept: FAMILY MEDICINE | Facility: OTHER | Age: 59
End: 2024-02-18
Payer: COMMERCIAL

## 2024-02-19 NOTE — TELEPHONE ENCOUNTER
Last office visit 12/18/23.     Okay to schedule in same day spot, or okay for virtual?    Vilma WEINBERGN, RN  [facility]

## 2024-02-20 ENCOUNTER — OFFICE VISIT (OUTPATIENT)
Dept: FAMILY MEDICINE | Facility: OTHER | Age: 59
End: 2024-02-20
Payer: COMMERCIAL

## 2024-02-20 VITALS
TEMPERATURE: 97.9 F | RESPIRATION RATE: 18 BRPM | HEART RATE: 85 BPM | OXYGEN SATURATION: 95 % | DIASTOLIC BLOOD PRESSURE: 80 MMHG | HEIGHT: 66 IN | WEIGHT: 195 LBS | SYSTOLIC BLOOD PRESSURE: 116 MMHG | BODY MASS INDEX: 31.34 KG/M2

## 2024-02-20 DIAGNOSIS — J44.9 COPD, SEVERE (H): ICD-10-CM

## 2024-02-20 DIAGNOSIS — M25.532 LEFT WRIST PAIN: ICD-10-CM

## 2024-02-20 DIAGNOSIS — Z86.718 PERSONAL HISTORY OF DVT (DEEP VEIN THROMBOSIS): ICD-10-CM

## 2024-02-20 DIAGNOSIS — E66.812 CLASS 2 OBESITY WITHOUT SERIOUS COMORBIDITY WITH BODY MASS INDEX (BMI) OF 35.0 TO 35.9 IN ADULT, UNSPECIFIED OBESITY TYPE: Primary | ICD-10-CM

## 2024-02-20 DIAGNOSIS — I82.462 ACUTE DEEP VEIN THROMBOSIS (DVT) OF CALF MUSCLE VEIN OF LEFT LOWER EXTREMITY (H): ICD-10-CM

## 2024-02-20 PROCEDURE — 99214 OFFICE O/P EST MOD 30 MIN: CPT | Performed by: PHYSICIAN ASSISTANT

## 2024-02-20 RX ORDER — PHENTERMINE HYDROCHLORIDE 37.5 MG/1
37.5 CAPSULE ORAL EVERY MORNING
Qty: 30 CAPSULE | Refills: 2 | Status: SHIPPED | OUTPATIENT
Start: 2024-02-20 | End: 2024-05-24

## 2024-02-20 RX ORDER — FLUTICASONE FUROATE, UMECLIDINIUM BROMIDE AND VILANTEROL TRIFENATATE 200; 62.5; 25 UG/1; UG/1; UG/1
1 POWDER RESPIRATORY (INHALATION) DAILY
Qty: 60 EACH | Refills: 5 | Status: SHIPPED | OUTPATIENT
Start: 2024-02-20 | End: 2024-06-28

## 2024-02-20 ASSESSMENT — PAIN SCALES - GENERAL: PAINLEVEL: NO PAIN (0)

## 2024-02-20 NOTE — PROGRESS NOTES
"  Assessment & Plan     Class 2 obesity without serious comorbidity with body mass index (BMI) of 35.0 to 35.9 in adult, unspecified obesity type  Increased patient's phentermine to 37.5 mg 4 months ago. She has since lost 21 lbs. She reports no side effects and her BP and heart rate is within normal limits. She has tracked her diet over the last week which is markedly improved from what her diet consisted of in the past. We discussed that the weight loss progress she has made is on track with what we expect and that should she continue the current diet that she has been eating she should see continued weight loss.   Follow-up in 3 months.   - phentermine (ADIPEX-P) 37.5 MG capsule; Take 1 capsule (37.5 mg) by mouth every morning    COPD, severe (H)  Patient quit smoking 1 year ago as of today, she reports no new concerns today. Encouraged her to maintain her abstinence from tobacco. Plan to have her continue her inhaler as prescribed.  - Fluticasone-Umeclidin-Vilanterol (TRELEGY ELLIPTA) 200-62.5-25 MCG/ACT oral inhaler; Inhale 1 puff into the lungs daily    Personal history of DVT (deep vein thrombosis)  Acute deep vein thrombosis (DVT) of calf muscle vein of left lower extremity (H)  Patient has history of DVT. She reports no new concerns today. She has been following with vascular surgery.    Left wrist pain  Patient has had ongoing left wrist pain that is suspected to be De Quervain's give the location and nature of pain. She has attempted thumb spica splinting and topical diclofenac with little relief. Plan for referral to orthopedics for further management.  - Orthopedic  Referral; Future    BMI  Estimated body mass index is 31.91 kg/m  as calculated from the following:    Height as of this encounter: 1.665 m (5' 5.55\").    Weight as of this encounter: 88.5 kg (195 lb).   Weight management plan: nutrition and medication management      Floridalma Carreon is a 58 year old, presenting for the " "following health issues:  Weight Loss        2/20/2024     2:32 PM   Additional Questions   Roomed by LENNY   Accompanied by NA         2/20/2024     2:32 PM   Patient Reported Additional Medications   Patient reports taking the following new medications None     History of Present Illness       Reason for visit:  Diet medication    She eats 2-3 servings of fruits and vegetables daily.She consumes 0 sweetened beverage(s) daily.She exercises with enough effort to increase her heart rate 10 to 19 minutes per day.  She exercises with enough effort to increase her heart rate 3 or less days per week.   She is taking medications regularly.       Review of Systems  Constitutional, HEENT, cardiovascular, pulmonary, gi and gu systems are negative, except as otherwise noted.      Objective    /80   Pulse 85   Temp 97.9  F (36.6  C) (Temporal)   Resp 18   Ht 1.665 m (5' 5.55\")   Wt 88.5 kg (195 lb)   LMP 03/14/2017 (Exact Date)   SpO2 95%   BMI 31.91 kg/m    Body mass index is 31.91 kg/m .  Physical Exam   GENERAL: alert and no distress  EYES: Eyes grossly normal to inspection,   HENT: normal cephalic/atraumatic  NECK: no adenopathy, no asymmetry, masses, or scars  RESP: lungs clear to auscultation - no rales, rhonchi or wheezes  CV: regular rate and rhythm, normal S1 S2, no S3 or S4, no murmur, click or rub, no peripheral edema  MS: no gross musculoskeletal defects noted, no edema. Finkelstein's improved pain on the left.  Mild swelling over the left radial styloid process without tenderness.   SKIN: no suspicious lesions or rashes  NEURO: Normal strength and tone, mentation intact and speech normal  PSYCH: mentation appears normal, affect normal/bright          Options for treatment and follow-up care were reviewed with the patient and/or guardian. Patient and/or guardian engaged in the decision making process and verbalized understanding of the options discussed and agreed with the final plan.    Parts of this " note were written by: JORDIN Franco-S2  Signed Electronically by: JORDIN Rich-C

## 2024-02-23 DIAGNOSIS — I83.893 VARICOSE VEINS OF BILATERAL LOWER EXTREMITIES WITH OTHER COMPLICATIONS: Primary | ICD-10-CM

## 2024-02-28 ENCOUNTER — OFFICE VISIT (OUTPATIENT)
Dept: ORTHOPEDICS | Facility: CLINIC | Age: 59
End: 2024-02-28
Attending: PHYSICIAN ASSISTANT
Payer: COMMERCIAL

## 2024-02-28 VITALS
BODY MASS INDEX: 31.34 KG/M2 | SYSTOLIC BLOOD PRESSURE: 120 MMHG | HEIGHT: 66 IN | TEMPERATURE: 97.4 F | WEIGHT: 195 LBS | DIASTOLIC BLOOD PRESSURE: 78 MMHG

## 2024-02-28 DIAGNOSIS — M25.532 LEFT WRIST PAIN: ICD-10-CM

## 2024-02-28 DIAGNOSIS — M65.839 INTERSECTION SYNDROME: Primary | ICD-10-CM

## 2024-02-28 PROCEDURE — 20605 DRAIN/INJ JOINT/BURSA W/O US: CPT | Mod: LT | Performed by: ORTHOPAEDIC SURGERY

## 2024-02-28 PROCEDURE — 99203 OFFICE O/P NEW LOW 30 MIN: CPT | Mod: 25 | Performed by: ORTHOPAEDIC SURGERY

## 2024-02-28 RX ADMIN — LIDOCAINE HYDROCHLORIDE 1 ML: 10 INJECTION, SOLUTION INFILTRATION; PERINEURAL at 16:31

## 2024-02-28 RX ADMIN — BUPIVACAINE HYDROCHLORIDE 1 ML: 5 INJECTION, SOLUTION PERINEURAL at 16:31

## 2024-02-28 RX ADMIN — TRIAMCINOLONE ACETONIDE 20 MG: 40 INJECTION, SUSPENSION INTRA-ARTICULAR; INTRAMUSCULAR at 16:31

## 2024-02-28 NOTE — PROGRESS NOTES
Medium Joint Injection/Arthrocentesis    Date/Time: 2/28/2024 4:31 PM    Performed by: Sharif Dawn MD  Authorized by: Sharif Dawn MD    Indications:  Pain  Needle Size:  25 G  Guidance: surface landmarks    Approach:  Anterior  Location:  Wrist  Location comment:  Left intersection syndrome  Medications:  20 mg triamcinolone 40 MG/ML; 1 mL BUPivacaine 0.5 %; 1 mL lidocaine 1 %  Procedure discussed: discussed risks, benefits, and alternatives    Consent Given by:  Patient  Timeout: timeout called immediately prior to procedure    Prep: patient was prepped and draped in usual sterile fashion

## 2024-02-28 NOTE — LETTER
2024         RE: Velma Rizvi  [address] [facility] [address]        Dear Colleague,    Thank you for referring your patient, Velma Rizvi, to the [facility]. Please see a copy of my visit note below.    S: L distal radial styloid region pain.  Has thumb spica brace with some relief.         Patient Active Problem List   Diagnosis     Tobacco use disorder     Varicose veins of legs     Obesity, Class I, BMI 30-34.9     Peripheral edema     COPD, severe (H)     Personal history of DVT (deep vein thrombosis)            Past Medical History:   Diagnosis Date     COPD, severe (H) 2019     Phlebitis and thrombophlebitis 10/12/2012     PONV (postoperative nausea and vomiting)             Past Surgical History:   Procedure Laterality Date     COLONOSCOPY N/A 2018    Procedure: COLONOSCOPY;  COLONOSCOPY;  Surgeon: Paresh Curran MD;  Location: [facility]     HC TREATMENT INCOMPLETE  SURG, ANY TRIMESTER      D & C  s/p miscarriage     LAPAROSCOPIC CHOLECYSTECTOMY N/A 2019    Procedure: LAPAROSCOPIC CHOLECYSTECTOMY;  Surgeon: Rob Gonzales DO;  Location:  OR     ZZC NONSPECIFIC PROCEDURE      Right hand surgery- tendon repair            Social History     Tobacco Use     Smoking status: Former     Packs/day: 1.00     Years: 37.00     Additional pack years: 0.00     Total pack years: 37.00     Types: Cigarettes     Start date: 1985     Quit date: 2023     Years since quittin.0     Passive exposure: Never     Smokeless tobacco: Never     Tobacco comments:     already quit   Substance Use Topics     Alcohol use: Not Currently     Comment: rarely            Family History   Problem Relation Age of Onset     Cancer Mother          - lung cancer with mets.     Respiratory Father         asthma     Lung Cancer Brother         Smoker     Hypertension Brother      Diabetes Brother      Cancer Maternal Grandmother         breast-  48 fatal age 51     Hypertension Son      Diabetes Maternal Uncle              No Known Allergies         Current Outpatient Medications   Medication Sig Dispense Refill     albuterol (PROAIR HFA) 108 (90 Base) MCG/ACT inhaler Inhale 2 puffs into the lungs every 4 hours as needed for shortness of breath 18 g 6     Cholecalciferol (VITAMIN D3) 75 MCG (3000 UT) TABS Take 75 mcg by mouth daily       COMPRESSION STOCKINGS 1 each daily Wear daily size large, two pair 2 each 0     Fluticasone-Umeclidin-Vilanterol (TRELEGY ELLIPTA) 200-62.5-25 MCG/ACT oral inhaler Inhale 1 puff into the lungs daily 60 each 5     ibuprofen (ADVIL/MOTRIN) 200 MG tablet Take 800 mg by mouth every 8 hours as needed for pain       phentermine (ADIPEX-P) 37.5 MG capsule Take 1 capsule (37.5 mg) by mouth every morning 30 capsule 2     ipratropium - albuterol 0.5 mg/2.5 mg/3 mL (DUONEB) 0.5-2.5 (3) MG/3ML neb solution Take 1 vial (3 mLs) by nebulization every 4 hours as needed for shortness of breath, wheezing or cough (Patient not taking: Reported on 2/20/2024) 90 mL 0     ondansetron (ZOFRAN ODT) 4 MG ODT tab Take 1 tablet (4 mg) by mouth every 8 hours as needed for nausea (Patient not taking: Reported on 12/18/2023) 15 tablet 0          Review Of Systems  Skin: negative  Eyes: negative  Ears/Nose/Throat: negative  Respiratory: No shortness of breath, dyspnea on exertion, cough, or hemoptysis    O: Physical Exam:  Negative finklestein either hand/wrist/  Pain to palpation over area just proximal to radial styloid at intersection of abductor pollicis and ECRL more so than over the 1st dorsal compartment pulleys.  CMS intact.    Lab:non contributory    Images:  Narrative & Impression   EXAM: XR WRIST LEFT G/E 3 VIEWS  LOCATION: [facility]  DATE: 12/18/2023     INDICATION: radial side of the wrist pain and swelling  COMPARISON: None.                                                                      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 Otherwise, normal joint spaces and alignment. No acute fracture.            A:  Intersection syndrome vs dequervain tenosynovitis L wrist      P: Inject intersection site proximal to radial styloid after verbal and written consent, ethyl chloride, chloroprep.  Follow outcomes:  no pain after injection  Notify if exacerbation symptoms  Use thumb spica brace when active  See back 2-3 mos                In addition to the above assessment and plan each active problem on Velma's problem list was evaluated today. This included the questioning of Velma for any medication problems. We will continue the current treatment plan for these active problems except as noted.        Medium Joint Injection/Arthrocentesis    Date/Time: 2/28/2024 4:31 PM    Performed by: Sharif Dawn MD  Authorized by: Sharif Dawn MD    Indications:  Pain  Needle Size:  25 G  Guidance: surface landmarks    Approach:  Anterior  Location:  Wrist  Location comment:  Left intersection syndrome  Medications:  20 mg triamcinolone 40 MG/ML; 1 mL BUPivacaine 0.5 %; 1 mL lidocaine 1 %  Procedure discussed: discussed risks, benefits, and alternatives    Consent Given by:  Patient  Timeout: timeout called immediately prior to procedure    Prep: patient was prepped and draped in usual sterile fashion          Again, thank you for allowing me to participate in the care of your patient.        Sincerely,        Sharif Dawn MD

## 2024-02-28 NOTE — PROGRESS NOTES
S: L distal radial styloid region pain.  Has thumb spica brace with some relief.         Patient Active Problem List   Diagnosis    Tobacco use disorder    Varicose veins of legs    Obesity, Class I, BMI 30-34.9    Peripheral edema    COPD, severe (H)    Personal history of DVT (deep vein thrombosis)            Past Medical History:   Diagnosis Date    COPD, severe (H) 2019    Phlebitis and thrombophlebitis 10/12/2012    PONV (postoperative nausea and vomiting)             Past Surgical History:   Procedure Laterality Date    COLONOSCOPY N/A 2018    Procedure: COLONOSCOPY;  COLONOSCOPY;  Surgeon: Paresh Curran MD;  Location:  GI    [facility] TREATMENT INCOMPLETE  SURG, ANY TRIMESTER      D & C  s/p miscarriage    LAPAROSCOPIC CHOLECYSTECTOMY N/A 2019    Procedure: LAPAROSCOPIC CHOLECYSTECTOMY;  Surgeon: Rob Gonzales DO;  Location:  OR    [facility] NONSPECIFIC PROCEDURE      Right hand surgery- tendon repair            Social History     Tobacco Use    Smoking status: Former     Packs/day: 1.00     Years: 37.00     Additional pack years: 0.00     Total pack years: 37.00     Types: Cigarettes     Start date: 1985     Quit date: 2023     Years since quittin.0     Passive exposure: Never    Smokeless tobacco: Never    Tobacco comments:     already quit   Substance Use Topics    Alcohol use: Not Currently     Comment: rarely            Family History   Problem Relation Age of Onset    Cancer Mother          - lung cancer with mets.    Respiratory Father         asthma    Lung Cancer Brother         Smoker    Hypertension Brother     Diabetes Brother     Cancer Maternal Grandmother         breast- 48 fatal age 51    Hypertension Son     Diabetes Maternal Uncle              No Known Allergies         Current Outpatient Medications   Medication Sig Dispense Refill    albuterol (PROAIR HFA) 108 (90 Base) MCG/ACT inhaler Inhale 2 puffs into the lungs every 4 hours as needed  for shortness of breath 18 g 6    Cholecalciferol (VITAMIN D3) 75 MCG (3000 UT) TABS Take 75 mcg by mouth daily      COMPRESSION STOCKINGS 1 each daily Wear daily size large, two pair 2 each 0    Fluticasone-Umeclidin-Vilanterol (TRELEGY ELLIPTA) 200-62.5-25 MCG/ACT oral inhaler Inhale 1 puff into the lungs daily 60 each 5    ibuprofen (ADVIL/MOTRIN) 200 MG tablet Take 800 mg by mouth every 8 hours as needed for pain      phentermine (ADIPEX-P) 37.5 MG capsule Take 1 capsule (37.5 mg) by mouth every morning 30 capsule 2    ipratropium - albuterol 0.5 mg/2.5 mg/3 mL (DUONEB) 0.5-2.5 (3) MG/3ML neb solution Take 1 vial (3 mLs) by nebulization every 4 hours as needed for shortness of breath, wheezing or cough (Patient not taking: Reported on 2/20/2024) 90 mL 0    ondansetron (ZOFRAN ODT) 4 MG ODT tab Take 1 tablet (4 mg) by mouth every 8 hours as needed for nausea (Patient not taking: Reported on 12/18/2023) 15 tablet 0          Review Of Systems  Skin: negative  Eyes: negative  Ears/Nose/Throat: negative  Respiratory: No shortness of breath, dyspnea on exertion, cough, or hemoptysis    O: Physical Exam:  Negative finklestein either hand/wrist/  Pain to palpation over area just proximal to radial styloid at intersection of abductor pollicis and ECRL more so than over the 1st dorsal compartment pulleys.  CMS intact.    Lab:non contributory    Images:  Narrative & Impression   EXAM: XR WRIST LEFT G/E 3 VIEWS  LOCATION: [facility]  DATE: 12/18/2023     INDICATION: radial side of the wrist pain and swelling  COMPARISON: None.                                                                      IMPRESSION: Mild degenerative arthrosis of the first CMC joint. Otherwise, normal joint spaces and alignment. No acute fracture.            A:  Intersection syndrome vs dequervain tenosynovitis L wrist      P: Inject intersection site proximal to radial styloid after verbal and written consent, ethyl chloride,  chloroprep.  Follow outcomes:  no pain after injection  Notify if exacerbation symptoms  Use thumb spica brace when active  See back 2-3 mos                In addition to the above assessment and plan each active problem on Velma's problem list was evaluated today. This included the questioning of Velma for any medication problems. We will continue the current treatment plan for these active problems except as noted.

## 2024-02-28 NOTE — PATIENT INSTRUCTIONS
Thank you for choosing [facility]!      FOLLOW-UP  Dr. Dawn would like you to follow-up in 6 weeks. Please stop by the  on your way out or call [phone] to schedule.       CARE AFTER YOUR INJECTION  Today you had a steroid injection of your left wrist.  *Please do not soak in a hot tub, bath, or swimming pool for the next 48 hours  *It is ok to shower  *Ice today and only do your normal amounts of activity  *The lidocaine (what is giving you pain relief right now) will likely stop working in 1-2 hours.  You will then have pain again, similar to before you received the injection. The corticosteroid will not start working until approximately 1-2 weeks from now.  *In a small percentage of people, cortisone can cause flushing/redness in the face. This usually lasts for 1-3 days and resolves. Cool compress, Ibuprofen/Tylenol can help if this happens.

## 2024-02-29 ENCOUNTER — TELEPHONE (OUTPATIENT)
Dept: ORTHOPEDICS | Facility: CLINIC | Age: 59
End: 2024-02-29
Payer: COMMERCIAL

## 2024-02-29 RX ORDER — TRIAMCINOLONE ACETONIDE 40 MG/ML
20 INJECTION, SUSPENSION INTRA-ARTICULAR; INTRAMUSCULAR
Status: SHIPPED | OUTPATIENT
Start: 2024-02-28

## 2024-02-29 RX ORDER — BUPIVACAINE HYDROCHLORIDE 5 MG/ML
1 INJECTION, SOLUTION PERINEURAL
Status: SHIPPED | OUTPATIENT
Start: 2024-02-28

## 2024-02-29 RX ORDER — LIDOCAINE HYDROCHLORIDE 10 MG/ML
1 INJECTION, SOLUTION INFILTRATION; PERINEURAL
Status: SHIPPED | OUTPATIENT
Start: 2024-02-28

## 2024-02-29 NOTE — TELEPHONE ENCOUNTER
Patient had an injection with Dr Dawn on 2/28, and she is concerned about some numbness in her fingers, and just wants to make sure. Please call patient at [phone], any time, to discuss, okay to leave detailed msg.

## 2024-02-29 NOTE — TELEPHONE ENCOUNTER
Attempted to call patient, no answer. Left voicemail and requested patient call back at [phone].       Ling Spaulding RN   [facility]

## 2024-02-29 NOTE — TELEPHONE ENCOUNTER
Patient returned call, she states that she is having numbness that runs down the back of her thumb and index finger. She was advised of the message below. She will return call should her symptoms worsen or if she has any additional questions.     Ling Spaulding RN   [facility]

## 2024-02-29 NOTE — TELEPHONE ENCOUNTER
Attempted to call patient, no answer. Left detailed voicemail advising that some numbness following an injection can be normal. Also asked that patient provide a bit more information about the numbness she is experiencing. Advised patient to call back at [phone] if there are any further questions. Bunndle message was sent to patient as well.     Ling Spaulding RN   [facility]

## 2024-03-01 ENCOUNTER — TRANSFERRED RECORDS (OUTPATIENT)
Dept: HEALTH INFORMATION MANAGEMENT | Facility: CLINIC | Age: 59
End: 2024-03-01
Payer: COMMERCIAL

## 2024-03-04 ENCOUNTER — TELEPHONE (OUTPATIENT)
Dept: VASCULAR SURGERY | Facility: CLINIC | Age: 59
End: 2024-03-04
Payer: COMMERCIAL

## 2024-03-04 DIAGNOSIS — I83.893 VARICOSE VEINS OF BILATERAL LOWER EXTREMITIES WITH OTHER COMPLICATIONS: Primary | ICD-10-CM

## 2024-03-04 RX ORDER — CLONIDINE HYDROCHLORIDE 0.1 MG/1
TABLET ORAL
Qty: 1 TABLET | Refills: 0 | Status: SHIPPED | OUTPATIENT
Start: 2024-03-04 | End: 2024-03-18

## 2024-03-04 RX ORDER — LORAZEPAM 1 MG/1
TABLET ORAL
Qty: 2 TABLET | Refills: 0 | Status: SHIPPED | OUTPATIENT
Start: 2024-03-04 | End: 2024-03-18

## 2024-03-04 NOTE — TELEPHONE ENCOUNTER
3/4/2024    Vein Clinic Preoperative Nurse Call    Procedure: Left leg Vnus closure GSV (med nec)   Date: 3/14/24  Surgeon: Dr. Morris  Time: 0900  Check in time: 0745    Called patient and left a detailed message. Informed patient: when to check in (0745) to sign consent, to bring their preop medications in their original bottle with them (2mg ativan, 0.1mg clonidine). Patient will take the medications after signing the consent to the procedure. Instructed patient to wear loose-fitting comfortable clothing, and bring their compression hose. Ensured patient has a /someone that will be responsible for them the rest of the day. Once procedure is completed, we will keep patient in recovery for 30-45 mins, and call  with aftercare instructions. Informed patient, that if possible, they should sit in the backseat to elevate their leg on the ride home.    Pt needs Thigh High compression hose for procedure. Status of the hose: patient instructed to bring them the day of procedure as the hose will be put on prior to discharge.      Patient understands if they have any of the following symptoms (fever, cough, shortness of breath, rash), they need to notify us immediately as they may need to cancel their procedure and reschedule for a later date.    Gave patient our call back number if any further questions or concerns.    Julia Porter RN  [facility]

## 2024-03-05 NOTE — TELEPHONE ENCOUNTER
SWP in regards to all preop information. Patient verbalized understanding. No further questions at this time.

## 2024-03-14 ENCOUNTER — OFFICE VISIT (OUTPATIENT)
Dept: VASCULAR SURGERY | Facility: CLINIC | Age: 59
End: 2024-03-14
Payer: COMMERCIAL

## 2024-03-14 VITALS — DIASTOLIC BLOOD PRESSURE: 80 MMHG | SYSTOLIC BLOOD PRESSURE: 135 MMHG | OXYGEN SATURATION: 94 % | HEART RATE: 83 BPM

## 2024-03-14 DIAGNOSIS — I83.892 VARICOSE VEINS OF LEG WITH SWELLING, LEFT: ICD-10-CM

## 2024-03-14 DIAGNOSIS — I83.812 VARICOSE VEINS OF LEFT LOWER EXTREMITY WITH PAIN: Primary | ICD-10-CM

## 2024-03-14 PROCEDURE — 36475 ENDOVENOUS RF 1ST VEIN: CPT | Mod: LT | Performed by: SURGERY

## 2024-03-14 NOTE — PROGRESS NOTES
Pre-procedure Nursing Note    Velma Falker presents to clinic for Vein Procedure  .   /Person Responsible for Patient: Lenny (Spouse)  Phone Number: [phone]    Prophylactic Medication:N/A   Sedation Medication: Ativan, 2mg ,   Time Taken: 0751, Clonidine 0.1mg Time Taken: 0751  Compression Stockings: Patient brought with today.  The procedure is being performed on LLE.  Patient understanding of procedure matches consent? YES    Patient's pre-procedure medications verified by AF.    Julia Porter RN on 3/14/2024 at 7:54 AM

## 2024-03-14 NOTE — PROGRESS NOTES
Vein Clinic Procedure Note    Indications:  Left leg varicose veins with pain and swelling; symptoms recalcitrant to conservative measures    Procedure:  Radiofrequency ablation left great saphenous vein    Procedure Description  Details of the procedure including risks of bleeding, infection, nerve injury, scarring, hyperpigmentation, deep vein thrombosis, recanalization of the great saphenous vein and recurrent varicose veins all discussed.  The patient voiced understanding and wished to proceed.  Informed consent was obtained.     I initialed her left lower extremity marking the operative site with indelible marker.  We then proceeded the operating room, had the patient lie supine on the operating table, then prepped and draped the left lower extremity sterilely.    We took a timeout to confirm the appropriate operative site and procedure: Radiofrequency ablation the left great saphenous vein    VNUS Closure  I imaged the left lower extremity easily identifying the left great saphenous vein.  The vein broke into multiple varicosities about 8 cm below the knee, therefore I chose to access the vein just cephalad of this.  I infiltrated the skin overlying the vein, placed a micropuncture needle into the vein followed by a micropuncture guidewire and a 6 Sami sheath.    We passed the closure fast device through the sheath, positioning of the tip of the device 2.16 centimeters from the saphenofemoral junction under ultrasound guidance with the operating table in Trendelenburg position.  Tumescent anesthetic was injected along the course of the great saphenous vein under ultrasound guidance with care to confirm catheter tip position prior to infiltrating the tissues in this location.    After allowing the block to take effect and after confirming appropriate position of the catheter, I applied compression to the proximal thigh great saphenous vein with the ultrasound probe and additional width 2 fingerbreadths  treating the first two 8 cm increments with 2 RF sessions each.  The remaining vein was treated with 1 RF session to each 8 cm increment with the exception of segments of vein where energy readings were higher.  We treated down to approximately 6 to 8 cm below the knee.  The patient was comfortable throughout.    After cleaning the pullback, I reimaged the vein and noted that the vein was noncompressible and closed.  The saphenofemoral junction and common femoral veins were fully compressible and free of thrombus.  This was documented.  The sheath and the catheter were removed and hemostasis secured with pressure.    The leg was cleaned with saline solution and a small gauze and Tegaderm dressing applied to the access site.  A compression hose was then applied.  We observed the patient for 30 minutes to ensure excellent hemostasis then took her to her 's car in a wheelchair.  Post procedure instructions were given to the patient and her  in verbal and written form and their questions answered.    The patient tolerated the procedure well without evidence of allergic reaction or other complications and will return in 72 hours for a left lower extremity venous ultrasound.    Procedures        3/14/2024     9:18 AM   Flowsheet Data   Procedure Start Time: 09:18   Prep: Chloraprep   Side: Left   Tx Length (cm): LEFT GSV: 55.5   Junction (cm): LEFT GSV: 2.16   RF Cycles: LEFT GSV: 10   RF TX Time (Minutes): LEFT GSV: 3:20   Sedation taken: Yes   Pre Pt. Physical / Cognitive Limitations: WNL   TOTAL Local anesthesia Injected (ml): 3   Max Volume Local Anesthesia (ml): 11   TOTAL Tumescent Injected volume (ml): 286   Max Volume Tumescent (ml): 286   Post Pt. Physical / Cognitive Limitations: WNL   Procedure End Time: 09:44   D/C Instructions given, states readiness to leave and escorted to car: Yes       C Saman Morris MD    Dictated using Dragon voice recognition software which may result in transcription  errors

## 2024-03-14 NOTE — LETTER
3/14/2024         RE: Velma Rizvi  [address] [place] [address]        Dear Colleague,    Thank you for referring your patient, Velma Rizvi, to the [facility] [place]. Please see a copy of my visit note below.    Pre-procedure Nursing Note    Velma Rizvi presents to clinic for Vein Procedure  .   /Person Responsible for Patient: Lenny (Spouse)  Phone Number: [phone]    Prophylactic Medication:N/A   Sedation Medication: Ativan, 2mg ,   Time Taken: 0751, Clonidine 0.1mg Time Taken: 0751  Compression Stockings: Patient brought with today.  The procedure is being performed on E.  Patient understanding of procedure matches consent? YES    Patient's pre-procedure medications verified by AF.    Julia Porter RN on 3/14/2024 at 7:54 AM          Vein Clinic Procedure Note    Indications:  Left leg varicose veins with pain and swelling; symptoms recalcitrant to conservative measures    Procedure:  Radiofrequency ablation left great saphenous vein    Procedure Description  Details of the procedure including risks of bleeding, infection, nerve injury, scarring, hyperpigmentation, deep vein thrombosis, recanalization of the great saphenous vein and recurrent varicose veins all discussed.  The patient voiced understanding and wished to proceed.  Informed consent was obtained.     I initialed her left lower extremity marking the operative site with indelible marker.  We then proceeded the operating room, had the patient lie supine on the operating table, then prepped and draped the left lower extremity sterilely.    We took a timeout to confirm the appropriate operative site and procedure: Radiofrequency ablation the left great saphenous vein    VNUS Closure  I imaged the left lower extremity easily identifying the left great saphenous vein.  The vein broke into multiple varicosities about 8 cm below the knee, therefore I chose to access the vein just cephalad of  this.  I infiltrated the skin overlying the vein, placed a micropuncture needle into the vein followed by a micropuncture guidewire and a 6 Slovak sheath.    We passed the closure fast device through the sheath, positioning of the tip of the device 2.16 centimeters from the saphenofemoral junction under ultrasound guidance with the operating table in Trendelenburg position.  Tumescent anesthetic was injected along the course of the great saphenous vein under ultrasound guidance with care to confirm catheter tip position prior to infiltrating the tissues in this location.    After allowing the block to take effect and after confirming appropriate position of the catheter, I applied compression to the proximal thigh great saphenous vein with the ultrasound probe and additional width 2 fingerbreadths treating the first two 8 cm increments with 2 RF sessions each.  The remaining vein was treated with 1 RF session to each 8 cm increment with the exception of segments of vein where energy readings were higher.  We treated down to approximately 6 to 8 cm below the knee.  The patient was comfortable throughout.    After cleaning the pullback, I reimaged the vein and noted that the vein was noncompressible and closed.  The saphenofemoral junction and common femoral veins were fully compressible and free of thrombus.  This was documented.  The sheath and the catheter were removed and hemostasis secured with pressure.    The leg was cleaned with saline solution and a small gauze and Tegaderm dressing applied to the access site.  A compression hose was then applied.  We observed the patient for 30 minutes to ensure excellent hemostasis then took her to her 's car in a wheelchair.  Post procedure instructions were given to the patient and her  in verbal and written form and their questions answered.    The patient tolerated the procedure well without evidence of allergic reaction or other complications and will  return in 72 hours for a left lower extremity venous ultrasound.    Procedures        3/14/2024     9:18 AM   Flowsheet Data   Procedure Start Time: 09:18   Prep: Chloraprep   Side: Left   Tx Length (cm): LEFT GSV: 55.5   Junction (cm): LEFT GSV: 2.16   RF Cycles: LEFT GSV: 10   RF TX Time (Minutes): LEFT GSV: 3:20   Sedation taken: Yes   Pre Pt. Physical / Cognitive Limitations: WNL   TOTAL Local anesthesia Injected (ml): 3   Max Volume Local Anesthesia (ml): 11   TOTAL Tumescent Injected volume (ml): 286   Max Volume Tumescent (ml): 286   Post Pt. Physical / Cognitive Limitations: WNL   Procedure End Time: 09:44   D/C Instructions given, states readiness to leave and escorted to car: Yes       SHAVONNE Morris MD    Dictated using Dragon voice recognition software which may result in transcription errors      Again, thank you for allowing me to participate in the care of your patient.        Sincerely,        Torres Morris MD

## 2024-03-18 ENCOUNTER — ALLIED HEALTH/NURSE VISIT (OUTPATIENT)
Dept: VASCULAR SURGERY | Facility: CLINIC | Age: 59
End: 2024-03-18
Attending: SURGERY
Payer: COMMERCIAL

## 2024-03-18 ENCOUNTER — ANCILLARY PROCEDURE (OUTPATIENT)
Dept: ULTRASOUND IMAGING | Facility: CLINIC | Age: 59
End: 2024-03-18
Attending: SURGERY
Payer: COMMERCIAL

## 2024-03-18 DIAGNOSIS — Z09 POSTOP CHECK: Primary | ICD-10-CM

## 2024-03-18 DIAGNOSIS — I83.893 VARICOSE VEINS OF BILATERAL LOWER EXTREMITIES WITH OTHER COMPLICATIONS: ICD-10-CM

## 2024-03-18 PROCEDURE — 93971 EXTREMITY STUDY: CPT | Mod: LT | Performed by: SURGERY

## 2024-03-18 PROCEDURE — 99207 PR NO CHARGE NURSE ONLY: CPT

## 2024-03-18 NOTE — PROGRESS NOTES
March 18, 2024    Vein Clinic Postoperative Nurse Note    Patient is here for her  96 hour  postoperative visit.    Procedure:  Left leg Vnus closure GSV (med nec)   Procedure Date: 3/14/24  Surgeon: Dr. Morris    Ultrasound Result: Left lower extremity negative for DVT. Left GSV is closed 4.7 mm from SFJ to proximal calf.    Physical Exam: Incisions are approximated without signs of infection.  Ecchymosis: mild bruising noted at insertion side left medial calf and upper medial left thigh  Swelling: none noted  Paresthesia: patient denies    Patient Questions or Concerns: wondering when can transfer back to knee high hose. Encouraged patient to wear thigh high until the end of the week and then can transition back to knee high. Patient verbalized understanding.     Patient don compression hose herself.    Reviewed postoperative instructions with patient and provided her with written material of common things to expect from her procedure.    Patient's Next Vein Clinic Appointment: 6 week follow up 5/2/24.     Julia Porter RN  [facility]

## 2024-03-25 DIAGNOSIS — R91.8 PULMONARY NODULES: Primary | ICD-10-CM

## 2024-04-05 ENCOUNTER — HOSPITAL ENCOUNTER (OUTPATIENT)
Dept: CT IMAGING | Facility: CLINIC | Age: 59
Discharge: HOME | End: 2024-04-05
Attending: PHYSICIAN ASSISTANT | Admitting: PHYSICIAN ASSISTANT
Payer: COMMERCIAL

## 2024-04-05 DIAGNOSIS — R91.8 PULMONARY NODULES: ICD-10-CM

## 2024-04-05 PROCEDURE — 71260 CT THORAX DX C+: CPT

## 2024-04-05 PROCEDURE — 250N000009 HC RX 250: Performed by: PHYSICIAN ASSISTANT

## 2024-04-05 PROCEDURE — 250N000011 HC RX IP 250 OP 636: Performed by: PHYSICIAN ASSISTANT

## 2024-04-05 RX ORDER — IOPAMIDOL 755 MG/ML
500 INJECTION, SOLUTION INTRAVASCULAR ONCE
Status: COMPLETED | OUTPATIENT
Start: 2024-04-05 | End: 2024-04-05

## 2024-04-05 RX ADMIN — SODIUM CHLORIDE 60 ML: 9 INJECTION, SOLUTION INTRAVENOUS at 14:03

## 2024-04-05 RX ADMIN — IOPAMIDOL 80 ML: 755 INJECTION, SOLUTION INTRAVENOUS at 14:04

## 2024-05-02 ENCOUNTER — OFFICE VISIT (OUTPATIENT)
Dept: VASCULAR SURGERY | Facility: CLINIC | Age: 59
End: 2024-05-02
Payer: COMMERCIAL

## 2024-05-02 DIAGNOSIS — I83.812 VARICOSE VEINS OF LEFT LOWER EXTREMITY WITH PAIN: Primary | ICD-10-CM

## 2024-05-02 PROCEDURE — 99207 PR VEINSOLUTIONS POST OPERATIVE VISIT: CPT | Performed by: SURGERY

## 2024-05-02 NOTE — LETTER
5/2/2024         RE: Velma Rizvi  [address] [facility] [address]        Dear Colleague,    Thank you for referring your patient, Velma Rizvi, to the [facility] [place]. Please see a copy of my visit note below.    [facility] vein clinic 6-week postop    Velma Rizvi returns in 6-week follow-up of radiofrequency ablation of her left great saphenous vein on 3/14/2024.  This was performed for extensive superficial thrombophlebitis of the great saphenous vein which had recanalized, placing her at risk for recurrent superficial phlebitis.  Overall she says she is doing well and has no complaints.  She really had no postoperative pain.  She does report some numbness from the mid thigh to the mid calf along the course of the treated great saphenous vein, however.    Exam  Left lower extremity: 4 mm varicosities on the posteromedial left calf.  No significant varicosities on the left medial thigh.  No erythema or induration along the course of the treated great saphenous vein.  There is decreased sensation from the mid thigh to the distal third of the medial left leg and a strip along the treated great saphenous vein.  No ankle edema.    Assessment  Overall doing well.  I informed her that the numbness can take up to a year to reach maximal improvement.  She is not bothered by it at all, only noticing it when she shaves.  I also encouraged her to wear compression on a regular basis.  She states she already wears them every single day at work.    Plan  Return for a 6-month post procedure left leg venous ultrasound    SHAVONNE Morris MD, FACS    Dictated using Dragon voice recognition software which may result in transcription errors      Again, thank you for allowing me to participate in the care of your patient.        Sincerely,        Torres Morris MD

## 2024-05-02 NOTE — PROGRESS NOTES
[facility] vein clinic 6-week postop    Velma Rizvi returns in 6-week follow-up of radiofrequency ablation of her left great saphenous vein on 3/14/2024.  This was performed for extensive superficial thrombophlebitis of the great saphenous vein which had recanalized, placing her at risk for recurrent superficial phlebitis.  Overall she says she is doing well and has no complaints.  She really had no postoperative pain.  She does report some numbness from the mid thigh to the mid calf along the course of the treated great saphenous vein, however.    Exam  Left lower extremity: 4 mm varicosities on the posteromedial left calf.  No significant varicosities on the left medial thigh.  No erythema or induration along the course of the treated great saphenous vein.  There is decreased sensation from the mid thigh to the distal third of the medial left leg and a strip along the treated great saphenous vein.  No ankle edema.    Assessment  Overall doing well.  I informed her that the numbness can take up to a year to reach maximal improvement.  She is not bothered by it at all, only noticing it when she shaves.  I also encouraged her to wear compression on a regular basis.  She states she already wears them every single day at work.    Plan  Return for a 6-month post procedure left leg venous ultrasound    C Saman Morris MD, FACS    Dictated using Dragon voice recognition software which may result in transcription errors

## 2024-05-24 DIAGNOSIS — E66.812 CLASS 2 OBESITY WITHOUT SERIOUS COMORBIDITY WITH BODY MASS INDEX (BMI) OF 35.0 TO 35.9 IN ADULT, UNSPECIFIED OBESITY TYPE: ICD-10-CM

## 2024-05-24 RX ORDER — PHENTERMINE HYDROCHLORIDE 37.5 MG/1
37.5 CAPSULE ORAL EVERY MORNING
Qty: 30 CAPSULE | Refills: 0 | Status: SHIPPED | OUTPATIENT
Start: 2024-05-24 | End: 2024-06-28

## 2024-06-02 ENCOUNTER — HOSPITAL ENCOUNTER (EMERGENCY)
Facility: CLINIC | Age: 59
Discharge: HOME | End: 2024-06-02
Attending: EMERGENCY MEDICINE | Admitting: EMERGENCY MEDICINE
Payer: COMMERCIAL

## 2024-06-02 ENCOUNTER — APPOINTMENT (OUTPATIENT)
Dept: CT IMAGING | Facility: CLINIC | Age: 59
End: 2024-06-02
Attending: EMERGENCY MEDICINE
Payer: COMMERCIAL

## 2024-06-02 VITALS
SYSTOLIC BLOOD PRESSURE: 114 MMHG | BODY MASS INDEX: 28.93 KG/M2 | OXYGEN SATURATION: 97 % | WEIGHT: 180 LBS | DIASTOLIC BLOOD PRESSURE: 70 MMHG | HEIGHT: 66 IN | RESPIRATION RATE: 18 BRPM | TEMPERATURE: 97.7 F | HEART RATE: 84 BPM

## 2024-06-02 DIAGNOSIS — R04.2 HEMOPTYSIS: ICD-10-CM

## 2024-06-02 DIAGNOSIS — R91.1 PULMONARY NODULE: ICD-10-CM

## 2024-06-02 DIAGNOSIS — J18.9 MULTIFOCAL PNEUMONIA: ICD-10-CM

## 2024-06-02 LAB
ANION GAP SERPL CALCULATED.3IONS-SCNC: 14 MMOL/L (ref 7–15)
BASOPHILS # BLD AUTO: 0.1 10E3/UL (ref 0–0.2)
BASOPHILS NFR BLD AUTO: 1 %
BUN SERPL-MCNC: 9.1 MG/DL (ref 6–20)
CALCIUM SERPL-MCNC: 9.8 MG/DL (ref 8.6–10)
CHLORIDE SERPL-SCNC: 101 MMOL/L (ref 98–107)
CREAT SERPL-MCNC: 0.67 MG/DL (ref 0.51–0.95)
DEPRECATED HCO3 PLAS-SCNC: 22 MMOL/L (ref 22–29)
EGFRCR SERPLBLD CKD-EPI 2021: >90 ML/MIN/1.73M2
EOSINOPHIL # BLD AUTO: 0.2 10E3/UL (ref 0–0.7)
EOSINOPHIL NFR BLD AUTO: 2 %
ERYTHROCYTE [DISTWIDTH] IN BLOOD BY AUTOMATED COUNT: 13.1 % (ref 10–15)
GLUCOSE SERPL-MCNC: 164 MG/DL (ref 70–99)
HCT VFR BLD AUTO: 36.9 % (ref 35–47)
HGB BLD-MCNC: 12.8 G/DL (ref 11.7–15.7)
IMM GRANULOCYTES # BLD: 0.1 10E3/UL
IMM GRANULOCYTES NFR BLD: 0 %
LYMPHOCYTES # BLD AUTO: 1 10E3/UL (ref 0.8–5.3)
LYMPHOCYTES NFR BLD AUTO: 9 %
MCH RBC QN AUTO: 31.2 PG (ref 26.5–33)
MCHC RBC AUTO-ENTMCNC: 34.7 G/DL (ref 31.5–36.5)
MCV RBC AUTO: 90 FL (ref 78–100)
MONOCYTES # BLD AUTO: 0.6 10E3/UL (ref 0–1.3)
MONOCYTES NFR BLD AUTO: 5 %
NEUTROPHILS # BLD AUTO: 10.3 10E3/UL (ref 1.6–8.3)
NEUTROPHILS NFR BLD AUTO: 84 %
NRBC # BLD AUTO: 0 10E3/UL
NRBC BLD AUTO-RTO: 0 /100
PLATELET # BLD AUTO: 250 10E3/UL (ref 150–450)
POTASSIUM SERPL-SCNC: 3.8 MMOL/L (ref 3.4–5.3)
RBC # BLD AUTO: 4.1 10E6/UL (ref 3.8–5.2)
SODIUM SERPL-SCNC: 137 MMOL/L (ref 135–145)
TROPONIN T SERPL HS-MCNC: 11 NG/L
WBC # BLD AUTO: 12.3 10E3/UL (ref 4–11)

## 2024-06-02 PROCEDURE — 99284 EMERGENCY DEPT VISIT MOD MDM: CPT | Performed by: EMERGENCY MEDICINE

## 2024-06-02 PROCEDURE — 96361 HYDRATE IV INFUSION ADD-ON: CPT | Performed by: EMERGENCY MEDICINE

## 2024-06-02 PROCEDURE — 85025 COMPLETE CBC W/AUTO DIFF WBC: CPT | Performed by: EMERGENCY MEDICINE

## 2024-06-02 PROCEDURE — 250N000011 HC RX IP 250 OP 636: Performed by: EMERGENCY MEDICINE

## 2024-06-02 PROCEDURE — 71275 CT ANGIOGRAPHY CHEST: CPT

## 2024-06-02 PROCEDURE — 258N000003 HC RX IP 258 OP 636: Performed by: EMERGENCY MEDICINE

## 2024-06-02 PROCEDURE — 84484 ASSAY OF TROPONIN QUANT: CPT | Performed by: EMERGENCY MEDICINE

## 2024-06-02 PROCEDURE — 93005 ELECTROCARDIOGRAM TRACING: CPT | Performed by: EMERGENCY MEDICINE

## 2024-06-02 PROCEDURE — 93010 ELECTROCARDIOGRAM REPORT: CPT | Performed by: EMERGENCY MEDICINE

## 2024-06-02 PROCEDURE — 80048 BASIC METABOLIC PNL TOTAL CA: CPT | Performed by: EMERGENCY MEDICINE

## 2024-06-02 PROCEDURE — 96360 HYDRATION IV INFUSION INIT: CPT | Mod: 59 | Performed by: EMERGENCY MEDICINE

## 2024-06-02 PROCEDURE — 99285 EMERGENCY DEPT VISIT HI MDM: CPT | Mod: 25 | Performed by: EMERGENCY MEDICINE

## 2024-06-02 PROCEDURE — 250N000013 HC RX MED GY IP 250 OP 250 PS 637: Performed by: EMERGENCY MEDICINE

## 2024-06-02 PROCEDURE — 36415 COLL VENOUS BLD VENIPUNCTURE: CPT | Performed by: EMERGENCY MEDICINE

## 2024-06-02 PROCEDURE — 250N000009 HC RX 250: Performed by: EMERGENCY MEDICINE

## 2024-06-02 RX ORDER — IOPAMIDOL 755 MG/ML
500 INJECTION, SOLUTION INTRAVASCULAR ONCE
Status: COMPLETED | OUTPATIENT
Start: 2024-06-02 | End: 2024-06-02

## 2024-06-02 RX ORDER — DOXYCYCLINE 100 MG/1
100 CAPSULE ORAL 2 TIMES DAILY
Qty: 10 CAPSULE | Refills: 0 | Status: SHIPPED | OUTPATIENT
Start: 2024-06-02 | End: 2024-06-07

## 2024-06-02 RX ORDER — DOXYCYCLINE 100 MG/1
100 CAPSULE ORAL ONCE
Status: COMPLETED | OUTPATIENT
Start: 2024-06-02 | End: 2024-06-02

## 2024-06-02 RX ORDER — CIPROFLOXACIN 500 MG/1
500 TABLET, FILM COATED ORAL 2 TIMES DAILY
COMMUNITY
Start: 2024-06-01 | End: 2024-06-28

## 2024-06-02 RX ORDER — CEFUROXIME AXETIL 250 MG/1
500 TABLET ORAL ONCE
Status: COMPLETED | OUTPATIENT
Start: 2024-06-02 | End: 2024-06-02

## 2024-06-02 RX ORDER — CEFUROXIME AXETIL 500 MG/1
500 TABLET ORAL 2 TIMES DAILY
Qty: 10 TABLET | Refills: 0 | Status: SHIPPED | OUTPATIENT
Start: 2024-06-02 | End: 2024-06-07

## 2024-06-02 RX ADMIN — CEFUROXIME AXETIL 500 MG: 250 TABLET ORAL at 22:27

## 2024-06-02 RX ADMIN — SODIUM CHLORIDE 1000 ML: 9 INJECTION, SOLUTION INTRAVENOUS at 20:58

## 2024-06-02 RX ADMIN — IOPAMIDOL 65 ML: 755 INJECTION, SOLUTION INTRAVENOUS at 21:22

## 2024-06-02 RX ADMIN — SODIUM CHLORIDE 70 ML: 9 INJECTION, SOLUTION INTRAVENOUS at 21:21

## 2024-06-02 RX ADMIN — DOXYCYCLINE 100 MG: 100 CAPSULE ORAL at 22:27

## 2024-06-02 ASSESSMENT — ACTIVITIES OF DAILY LIVING (ADL)
ADLS_ACUITY_SCORE: 33
ADLS_ACUITY_SCORE: 35
ADLS_ACUITY_SCORE: 35

## 2024-06-02 ASSESSMENT — COLUMBIA-SUICIDE SEVERITY RATING SCALE - C-SSRS
1. IN THE PAST MONTH, HAVE YOU WISHED YOU WERE DEAD OR WISHED YOU COULD GO TO SLEEP AND NOT WAKE UP?: NO
2. HAVE YOU ACTUALLY HAD ANY THOUGHTS OF KILLING YOURSELF IN THE PAST MONTH?: NO
6. HAVE YOU EVER DONE ANYTHING, STARTED TO DO ANYTHING, OR PREPARED TO DO ANYTHING TO END YOUR LIFE?: NO

## 2024-06-03 NOTE — DISCHARGE INSTRUCTIONS
You have pneumonia scattered throughout your lungs.  You are to undergo 5 days of antibiotics.  I gave your first dose here.  Stop the ciprofloxacin.  I expect that you slightly worsened tomorrow before you start to feel better in the next several days.  If you worsen significantly or develop new symptoms you find concerning please return here.    Your lung nodule requires 6-month follow-up.

## 2024-06-03 NOTE — ED PROVIDER NOTES
History     chief complaint  HPI  Patient is a 58-year-old female with a history of a lung nodule and COPD as well as DVT no longer on anticoagulation presented with a chief complaint of day 1 of cough and hemoptysis.  Does not feel ill.  No preceding rhinorrhea or sore throat.  Yesterday she went to urgent care for right flank pain.  She was given ciprofloxacin but not told whether or not she had a UTI.  Her right flank pain is now gone.  She started developing slight hemoptysis today.  No large-volume.  Feels short of breath with some slight chest tightness.  Not pleuritic.  Not exertional.    Review of Systems:  All organ systems below were reviewed and are negative unless indicated in the HPI.    Constitutional  Eyes  ENT  Respiratory  Cardiovascular  Gastrointestinal  Genitourinary  Musculoskeletal  Skin  Neuro    Allergies:  No Known Allergies    Problem List:    Patient Active Problem List    Diagnosis Date Noted    Personal history of DVT (deep vein thrombosis) 2024     Priority: Medium    COPD, severe (H) 2019     Priority: Medium    Peripheral edema 2018     Priority: Medium    Obesity, Class I, BMI 30-34.9 2017     Priority: Medium    Varicose veins of legs 2011     Priority: Medium     See abnormal US study.      Tobacco use disorder 2008     Priority: Medium        Past Medical History:    Past Medical History:   Diagnosis Date    COPD, severe (H) 2019    Phlebitis and thrombophlebitis 10/12/2012    PONV (postoperative nausea and vomiting)        Past Surgical History:    Past Surgical History:   Procedure Laterality Date    COLONOSCOPY N/A 2018    Procedure: COLONOSCOPY;  COLONOSCOPY;  Surgeon: Paresh Curran MD;  Location:  GI     TREATMENT INCOMPLETE  SURG, ANY TRIMESTER      D & C  s/p miscarriage    LAPAROSCOPIC CHOLECYSTECTOMY N/A 2019    Procedure: LAPAROSCOPIC CHOLECYSTECTOMY;  Surgeon: Rob Gonzales DO;  Location:  "[facility] OR    ZZC NONSPECIFIC PROCEDURE      Right hand surgery- tendon repair       Family History:    Family History   Problem Relation Age of Onset    Cancer Mother          - lung cancer with mets.    Respiratory Father         asthma    Lung Cancer Brother         Smoker    Hypertension Brother     Diabetes Brother     Cancer Maternal Grandmother         breast- 48 fatal age 51    Hypertension Son     Diabetes Maternal Uncle        Medications:    cefuroxime (CEFTIN) 500 MG tablet  ciprofloxacin (CIPRO) 500 MG tablet  doxycycline hyclate (VIBRAMYCIN) 100 MG capsule  albuterol (PROAIR HFA) 108 (90 Base) MCG/ACT inhaler  Cholecalciferol (VITAMIN D3) 75 MCG (3000 UT) TABS  COMPRESSION STOCKINGS  Fluticasone-Umeclidin-Vilanterol (TRELEGY ELLIPTA) 200-62.5-25 MCG/ACT oral inhaler  ibuprofen (ADVIL/MOTRIN) 200 MG tablet  ipratropium - albuterol 0.5 mg/2.5 mg/3 mL (DUONEB) 0.5-2.5 (3) MG/3ML neb solution  phentermine (ADIPEX-P) 37.5 MG capsule          Physical Exam   BP: 111/72  Pulse: 88  Temp: 97.7  F (36.5  C)  Resp: 18  Height: 167.6 cm (5' 6\")  Weight: 81.6 kg (180 lb)  SpO2: 96 %    Gen: Vital signs reviewed  Eyes: Sclera white, pupils round  ENT: External ears and nares normal  Card: Regular rate and rhythm  Resp: No respiratory distress.  Symmetric expiratory wheezing and crackles.  Abd: Soft, non-distended, non-tender  Extremities: Symmetric distal pulses.  Skin: No rashes  Neuro: Alert, speech normal.     ED Course        Procedures         EKG interpreted by myself.  EKG demonstrates sinus rhythm at a rate of 84 bpm, MA interval 146 ms, QTc 432 ms, QRS duration 150 ms with a right bundle branch block with left axis bifascicular block.  This is similar to EKG on 2023.         Results for orders placed or performed during the hospital encounter of 24 (from the past 24 hour(s))   CBC with platelets differential    Narrative    The following orders were created for panel order CBC with platelets " differential.  Procedure                               Abnormality         Status                     ---------                               -----------         ------                     CBC with platelets and d...[[id number]]  Abnormal            Final result                 Please view results for these tests on the individual orders.   Basic metabolic panel   Result Value Ref Range    Sodium 137 135 - 145 mmol/L    Potassium 3.8 3.4 - 5.3 mmol/L    Chloride 101 98 - 107 mmol/L    Carbon Dioxide (CO2) 22 22 - 29 mmol/L    Anion Gap 14 7 - 15 mmol/L    Urea Nitrogen 9.1 6.0 - 20.0 mg/dL    Creatinine 0.67 0.51 - 0.95 mg/dL    GFR Estimate >90 >60 mL/min/1.73m2    Calcium 9.8 8.6 - 10.0 mg/dL    Glucose 164 (H) 70 - 99 mg/dL   Troponin T, High Sensitivity   Result Value Ref Range    Troponin T, High Sensitivity 11 <=14 ng/L   CBC with platelets and differential   Result Value Ref Range    WBC Count 12.3 (H) 4.0 - 11.0 10e3/uL    RBC Count 4.10 3.80 - 5.20 10e6/uL    Hemoglobin 12.8 11.7 - 15.7 g/dL    Hematocrit 36.9 35.0 - 47.0 %    MCV 90 78 - 100 fL    MCH 31.2 26.5 - 33.0 pg    MCHC 34.7 31.5 - 36.5 g/dL    RDW 13.1 10.0 - 15.0 %    Platelet Count 250 150 - 450 10e3/uL    % Neutrophils 84 %    % Lymphocytes 9 %    % Monocytes 5 %    % Eosinophils 2 %    % Basophils 1 %    % Immature Granulocytes 0 %    NRBCs per 100 WBC 0 <1 /100    Absolute Neutrophils 10.3 (H) 1.6 - 8.3 10e3/uL    Absolute Lymphocytes 1.0 0.8 - 5.3 10e3/uL    Absolute Monocytes 0.6 0.0 - 1.3 10e3/uL    Absolute Eosinophils 0.2 0.0 - 0.7 10e3/uL    Absolute Basophils 0.1 0.0 - 0.2 10e3/uL    Absolute Immature Granulocytes 0.1 <=0.4 10e3/uL    Absolute NRBCs 0.0 10e3/uL   CT Chest Pulmonary Embolism w Contrast    Narrative    EXAM: CT CHEST PULMONARY EMBOLISM W CONTRAST  LOCATION: [facility]  DATE: 6/2/2024    INDICATION: Hemoptysis. Chest pain. Prior history of DVT.  COMPARISON: CT chest 4/5/2024, CT chest  abdomen pelvis 12/21/2022  TECHNIQUE: CT chest pulmonary angiogram during arterial phase injection of IV contrast. Multiplanar reformats and MIP reconstructions were performed. Dose reduction techniques were used.   CONTRAST: 65 mL Isovue 370    FINDINGS:  ANGIOGRAM CHEST: Pulmonary arteries are normal caliber and negative for pulmonary emboli. Thoracic aorta is negative for dissection. No CT evidence of right heart strain.    LUNGS AND PLEURA: New patchy consolidation in the right lower lobe. Additional patchy airspace opacities in the left lower lobe, left upper lobe lingula, and right middle lobe. Severe upper lobe predominant centrilobular emphysema. Redemonstrated right   upper lobe spiculated nodular opacity measuring 0.8 x 0.7 cm. No pleural effusion or pneumothorax.    MEDIASTINUM/AXILLAE: Newly enlarged right hilar lymph nodes, measuring up to 1.5 cm in short axis. No additional lymphadenopathy. No pericardial effusion.    CORONARY ARTERY CALCIFICATION: Mild.    UPPER ABDOMEN: Redemonstrated left renal cysts, for which no follow-up is indicated.    MUSCULOSKELETAL: Multilevel degenerative changes of the spine.      Impression    IMPRESSION:  1.  No acute pulmonary embolism.    2.  Right lower lobe consolidation with additional patchy airspace opacities bilaterally, compatible with multifocal pneumonia.    3.  Enlarged right hilar lymph nodes, likely reactive.    4.  Redemonstrated right upper lobe 0.8 cm spiculated nodular opacity. Recommend follow-up CT chest in 6 months as suggested on prior CT.       Medications   sodium chloride 0.9% BOLUS 1,000 mL (0 mLs Intravenous Stopped 6/2/24 2231)   iopamidol (ISOVUE-370) solution 500 mL (65 mLs Intravenous $Given 6/2/24 2122)   sodium chloride 0.9 % bag 100mL for CT scan flush use (70 mLs Intravenous $Given 6/2/24 2121)   cefuroxime (CEFTIN) tablet 500 mg (500 mg Oral $Given 6/2/24 2227)   doxycycline hyclate (VIBRAMYCIN) capsule 100 mg (100 mg Oral $Given  6/2/24 4415)         Consultations:  None    Social Determinants of Health:  Manages ADLs    Independent Review of Notes:  None  Assessments & Plan (with Medical Decision Making)       I have reviewed the nursing notes.    I have reviewed the findings, diagnosis, plan and need for follow up with the patient.      Medical Decision Making  On arrival, vital signs are normal.  She is afebrile and not hypoxic.  Differential presentation includes malignancy, bronchitis, pneumonia, PE.  Given her history of DVT without being on anticoagulation, I proceeded straight to CT PE study.  This reveals multifocal pneumonia and a known spiculated lung nodule.  Recommended 6-month follow-up on that.  Her hemoptysis is only very mild, thus I do not suspect that this is pulmonary alveolar hemorrhage causing the CT findings.  Will treat with double coverage with cefuroxime and doxycycline.  Recommended stopping the ciprofloxacin as cefuroxime will cover her UTI.  I discussed strict return precautions here and she was discharged home in stable condition with outpatient follow-up.    Final diagnoses:   Multifocal pneumonia   Pulmonary nodule   Hemoptysis         Arpan Wayne M.D.   [facility] Emergency Department     Arpan Wayne MD  06/02/24 6631

## 2024-06-03 NOTE — ED TRIAGE NOTES
Pt seen in UC yesterday for UTI - started cipro. Today c/o sob and coughing up blood. Hx COPD - rx inhalers.      Triage Assessment (Adult)       Row Name 06/02/24 1942          Triage Assessment    Airway WDL WDL        Respiratory WDL    Respiratory WDL WDL        Cardiac WDL    Cardiac WDL WDL

## 2024-06-05 ENCOUNTER — PATIENT OUTREACH (OUTPATIENT)
Dept: CARE COORDINATION | Facility: CLINIC | Age: 59
End: 2024-06-05
Payer: COMMERCIAL

## 2024-06-19 ENCOUNTER — PATIENT OUTREACH (OUTPATIENT)
Dept: CARE COORDINATION | Facility: CLINIC | Age: 59
End: 2024-06-19
Payer: COMMERCIAL

## 2024-06-28 ENCOUNTER — OFFICE VISIT (OUTPATIENT)
Dept: FAMILY MEDICINE | Facility: OTHER | Age: 59
End: 2024-06-28
Payer: COMMERCIAL

## 2024-06-28 ENCOUNTER — ANCILLARY PROCEDURE (OUTPATIENT)
Dept: GENERAL RADIOLOGY | Facility: OTHER | Age: 59
End: 2024-06-28
Attending: PHYSICIAN ASSISTANT
Payer: COMMERCIAL

## 2024-06-28 VITALS
TEMPERATURE: 97.5 F | DIASTOLIC BLOOD PRESSURE: 64 MMHG | SYSTOLIC BLOOD PRESSURE: 110 MMHG | HEART RATE: 84 BPM | OXYGEN SATURATION: 94 % | RESPIRATION RATE: 20 BRPM | WEIGHT: 180.5 LBS | HEIGHT: 66 IN | BODY MASS INDEX: 29.01 KG/M2

## 2024-06-28 DIAGNOSIS — Z13.1 SCREENING FOR DIABETES MELLITUS: ICD-10-CM

## 2024-06-28 DIAGNOSIS — J43.2 CENTRILOBULAR EMPHYSEMA (H): ICD-10-CM

## 2024-06-28 DIAGNOSIS — Z12.31 VISIT FOR SCREENING MAMMOGRAM: ICD-10-CM

## 2024-06-28 DIAGNOSIS — Z11.59 NEED FOR HEPATITIS B SCREENING TEST: ICD-10-CM

## 2024-06-28 DIAGNOSIS — E66.812 CLASS 2 OBESITY WITHOUT SERIOUS COMORBIDITY WITH BODY MASS INDEX (BMI) OF 35.0 TO 35.9 IN ADULT, UNSPECIFIED OBESITY TYPE: Primary | ICD-10-CM

## 2024-06-28 DIAGNOSIS — J44.9 COPD, SEVERE (H): ICD-10-CM

## 2024-06-28 DIAGNOSIS — Z13.220 SCREENING FOR HYPERLIPIDEMIA: ICD-10-CM

## 2024-06-28 DIAGNOSIS — Z23 NEED FOR PNEUMOCOCCAL VACCINATION: ICD-10-CM

## 2024-06-28 LAB
CHOLEST SERPL-MCNC: 210 MG/DL
FASTING STATUS PATIENT QL REPORTED: YES
FASTING STATUS PATIENT QL REPORTED: YES
GLUCOSE SERPL-MCNC: 93 MG/DL (ref 70–99)
HDLC SERPL-MCNC: 80 MG/DL
LDLC SERPL CALC-MCNC: 115 MG/DL
NONHDLC SERPL-MCNC: 130 MG/DL
TRIGL SERPL-MCNC: 73 MG/DL

## 2024-06-28 PROCEDURE — 99214 OFFICE O/P EST MOD 30 MIN: CPT | Mod: 25 | Performed by: PHYSICIAN ASSISTANT

## 2024-06-28 PROCEDURE — 90746 HEPB VACCINE 3 DOSE ADULT IM: CPT | Performed by: PHYSICIAN ASSISTANT

## 2024-06-28 PROCEDURE — 90677 PCV20 VACCINE IM: CPT | Performed by: PHYSICIAN ASSISTANT

## 2024-06-28 PROCEDURE — 90471 IMMUNIZATION ADMIN: CPT | Performed by: PHYSICIAN ASSISTANT

## 2024-06-28 PROCEDURE — 36415 COLL VENOUS BLD VENIPUNCTURE: CPT | Performed by: PHYSICIAN ASSISTANT

## 2024-06-28 PROCEDURE — 71046 X-RAY EXAM CHEST 2 VIEWS: CPT | Mod: TC | Performed by: RADIOLOGY

## 2024-06-28 PROCEDURE — 90472 IMMUNIZATION ADMIN EACH ADD: CPT | Performed by: PHYSICIAN ASSISTANT

## 2024-06-28 PROCEDURE — 82947 ASSAY GLUCOSE BLOOD QUANT: CPT | Performed by: PHYSICIAN ASSISTANT

## 2024-06-28 PROCEDURE — 80061 LIPID PANEL: CPT | Performed by: PHYSICIAN ASSISTANT

## 2024-06-28 RX ORDER — UMECLIDINIUM 62.5 UG/1
1 AEROSOL, POWDER ORAL DAILY
Qty: 60 EACH | Refills: 1 | Status: SHIPPED | OUTPATIENT
Start: 2024-06-28 | End: 2024-06-28

## 2024-06-28 RX ORDER — PHENTERMINE HYDROCHLORIDE 37.5 MG/1
37.5 CAPSULE ORAL EVERY MORNING
Qty: 30 CAPSULE | Refills: 2 | Status: SHIPPED | OUTPATIENT
Start: 2024-06-28

## 2024-06-28 RX ORDER — IPRATROPIUM BROMIDE AND ALBUTEROL SULFATE 2.5; .5 MG/3ML; MG/3ML
1 SOLUTION RESPIRATORY (INHALATION) EVERY 4 HOURS PRN
Qty: 90 ML | Refills: 2 | Status: SHIPPED | OUTPATIENT
Start: 2024-06-28

## 2024-06-28 RX ORDER — PREDNISONE 20 MG/1
40 TABLET ORAL DAILY
Qty: 10 TABLET | Refills: 0 | Status: SHIPPED | OUTPATIENT
Start: 2024-06-28 | End: 2024-07-03

## 2024-06-28 RX ORDER — FLUTICASONE FUROATE, UMECLIDINIUM BROMIDE AND VILANTEROL TRIFENATATE 200; 62.5; 25 UG/1; UG/1; UG/1
1 POWDER RESPIRATORY (INHALATION) DAILY
Qty: 60 EACH | Refills: 5 | Status: SHIPPED | OUTPATIENT
Start: 2024-06-28

## 2024-06-28 ASSESSMENT — PAIN SCALES - GENERAL: PAINLEVEL: NO PAIN (0)

## 2024-06-28 NOTE — NURSING NOTE
Prior to immunization administration, verified patients identity using patient s name and date of birth. Please see Immunization Activity for additional information.     Screening Questionnaire for Adult Immunization    Are you sick today?   No   Do you have allergies to medications, food, a vaccine component or latex?   No   Have you ever had a serious reaction after receiving a vaccination?   No   Do you have a long-term health problem with heart, lung, kidney, or metabolic disease (e.g., diabetes), asthma, a blood disorder, no spleen, complement component deficiency, a cochlear implant, or a spinal fluid leak?  Are you on long-term aspirin therapy?   Yes, prescribed for this   Do you have cancer, leukemia, HIV/AIDS, or any other immune system problem?   No   Do you have a parent, brother, or sister with an immune system problem?   No   In the past 3 months, have you taken medications that affect  your immune system, such as prednisone, other steroids, or anticancer drugs; drugs for the treatment of rheumatoid arthritis, Crohn s disease, or psoriasis; or have you had radiation treatments?   No   Have you had a seizure, or a brain or other nervous system problem?   No   During the past year, have you received a transfusion of blood or blood    products, or been given immune (gamma) globulin or antiviral drug?   No   For women: Are you pregnant or is there a chance you could become       pregnant during the next month?   No   Have you received any vaccinations in the past 4 weeks?   No     Immunization questionnaire was positive for at least one answer.  Notified Hal Key.      Patient instructed to remain in clinic for 15 minutes afterwards, and to report any adverse reactions.     Screening performed by Dolly Pickett MA on 6/28/2024 at 8:15 AM.

## 2024-06-28 NOTE — PROGRESS NOTES
Assessment & Plan     Class 2 obesity without serious comorbidity with body mass index (BMI) of 35.0 to 35.9 in adult, unspecified obesity type  - Doing well with weight loss.   - Continue on Phentermine, refilled for 3 months.  - Consider reducing dose at next follow-up to see if she can maintain weight loss since she has made the lifestyle changes.      Goals:  - Increase in strength training at least 3 days per week.   - Find some higher protein snacks to consume  - Cut back down on the sugary snacks at night.     - phentermine (ADIPEX-P) 37.5 MG capsule; Take 1 capsule (37.5 mg) by mouth every morning    Centrilobular emphysema (H)  COPD, severe (H)  She completed two antibiotics that were prescribed by the ER. She notes still having chest congestion. Lung exam showed no concerns. Her x-ray showed no acute opacities.  Suspect this is just due to severity of Emphysema and inflammation and mucus.  Recommended restarting on Mucinex. Will start on course of Prednisone.  Would recommend in the future for symptoms when they start to flare that we start on Prednisone and possibly added on Azithromycin as well. If not improving over the weekend will add on Azithromycin course on as well.  May need to consider seeing pulmonology if she continues to get flares. Will vaccinate for pneumonia as well today as she has not had PCV20 and is almost 5 years since last vaccine and she has had 2 ER needs in the last 6-12 months.   - XR Chest 2 Views; Future  - predniSONE (DELTASONE) 20 MG tablet; Take 2 tablets (40 mg) by mouth daily for 5 days  - Fluticasone-Umeclidin-Vilanterol (TRELEGY ELLIPTA) 200-62.5-25 MCG/ACT oral inhaler; Inhale 1 puff into the lungs daily  - ipratropium - albuterol 0.5 mg/2.5 mg/3 mL (DUONEB) 0.5-2.5 (3) MG/3ML neb solution; Take 1 vial (3 mLs) by nebulization every 4 hours as needed for shortness of breath, wheezing or cough    Screening for hyperlipidemia  Screening.   - Lipid panel reflex to direct  LDL Fasting; Future  - Lipid panel reflex to direct LDL Fasting    Screening for diabetes mellitus  Screening.   - Glucose; Future  - Glucose    Need for pneumococcal vaccination  updated  - PNEUMOCOCCAL 20 VALENT CONJUGATE (PREVNAR 20)    Need for hepatitis B screening test  Updated  - HEPATITIS B VACCINE (20 YEARS AND OLDER) (ENGERIX-B/RECOMBIVAX)    Visit for screening mammogram  Due.  - MA Screening Bilateral w/ [place]; Future      Follow-up in 3 months, sooner if needed.     Options for treatment and follow-up care were reviewed with the patient and/or guardian. Patient and/or guardian engaged in the decision making process and verbalized understanding of the options discussed and agreed with the final plan.     Floridalma Carreon is a 58 year old, presenting for the following health issues:  Recheck Medication and Weight Check      6/28/2024     7:38 AM   Additional Questions   Roomed by Dolly JOE     History of Present Illness       Reason for visit:  Talk about my diet pills and weights Loss    She eats 2-3 servings of fruits and vegetables daily.She consumes 0 sweetened beverage(s) daily.She exercises with enough effort to increase her heart rate 10 to 19 minutes per day.  She exercises with enough effort to increase her heart rate 3 or less days per week.   She is taking medications regularly.       Medication Followup of Phentermine  Taking Medication as prescribed: yes  Side Effects:  None  Medication Helping Symptoms:  yes- Last weight on 2/20/2024 195lb  Drinks protein shake, has fruit, lunch is a salad with chicken breast, sunflower seeds, cheese and tomatoes and dressing.  Dinner is protein and vegetables    The 10-year ASCVD risk score (Yamileth DK, et al., 2019) is: 1.4%    Values used to calculate the score:      Age: 58 years      Sex: Female      Is Non- : No      Diabetic: No      Tobacco smoker: No      Systolic Blood Pressure: 110 mmHg      Is BP treated: No      HDL  "Cholesterol: 71 mg/dL      Total Cholesterol: 221 mg/dL  .       Review of Systems  Constitutional, HEENT, cardiovascular, pulmonary, musculoskeletal, neuro, skin, endocrine and psych systems are negative, except as otherwise noted.      Objective    /64   Pulse 84   Temp 97.5  F (36.4  C) (Temporal)   Resp 20   Ht 1.67 m (5' 5.75\")   Wt 81.9 kg (180 lb 8 oz)   LMP 03/14/2017 (Exact Date)   SpO2 94%   BMI 29.36 kg/m    Body mass index is 29.36 kg/m .  Physical Exam   GENERAL: alert and no distress  RESP: lungs clear to auscultation - no rales, rhonchi or wheezes  CV: regular rate and rhythm, normal S1 S2, no S3 or S4, no murmur, click or rub, no peripheral edema  MS: no gross musculoskeletal defects noted, no edema  PSYCH: mentation appears normal, affect normal/bright    Results for orders placed or performed in visit on 06/28/24   XR Chest 2 Views     Status: None    Narrative    XR CHEST 2 VIEWS   6/28/2024 8:24 AM     HISTORY: Centrilobular emphysema    COMPARISON: CT 6/2/2024.      Impression    IMPRESSION:   1. Slightly hyperinflated lungs. No focal consolidation, pleural  effusion, or pneumothorax.  2. Normal cardiac and mediastinal silhouette.    ISABEL GARCIA MD         SYSTEM ID:  [id number]           Signed Electronically by: Shahid Hong PA-C    "

## 2024-07-17 ENCOUNTER — PATIENT OUTREACH (OUTPATIENT)
Dept: CARE COORDINATION | Facility: CLINIC | Age: 59
End: 2024-07-17
Payer: COMMERCIAL

## 2024-09-01 ENCOUNTER — NURSE TRIAGE (OUTPATIENT)
Dept: NURSING | Facility: CLINIC | Age: 59
End: 2024-09-01

## 2024-09-01 DIAGNOSIS — U07.1 INFECTION DUE TO 2019 NOVEL CORONAVIRUS: Primary | ICD-10-CM

## 2024-09-02 NOTE — TELEPHONE ENCOUNTER
COVID Positive/Requesting COVID treatment    Patient is positive for COVID and requesting treatment options.    Date of positive COVID test (PCR or at home)? 9/01/2024  Current COVID symptoms: cough, shortness of breath or difficulty breathing, and congestion or runny nose (Hx: COPD baseline shortness of breath with walking, no change,  not worse)  Date COVID symptoms began: 08/31/2024    Message should be routed to clinic RN pool. Best phone number to use for call back: [phone], (ok to [facility] voicemail identifies pt's name)     As of 9/1 on Day 1 with symptoms, will be Day 3 on Tuesday when clinic reopens    High risk: Age, COPD, Emphysema, (Hx: of smoking not active), BMI 29.1( Weight 175 pounds, Height 5'5,     Med List (no longer taking): Bupivicaine injection, Albuterol inhaler, Zithromax, Ibuprofen, Lidocaine injection, Triamcinolone injection,  New OTC med: contains Diphenhydramine, Phenylephrine, Acetaminophen,    Upon review no Medication interactions found with Paxlovid    Nurse Triage SBAR    Is this a 2nd Level Triage? NO    Situation: COVID + requesting Paxlovid    Background: COVID symptoms began on 8/31, tested + on 9/1/2024    Assessment:   -Hx; COPD, Emphysema    Protocol Recommended Disposition:   Call PCP Within 24 Hours    Recommendation: Pt. Declined care advice, reviewed importance of extra fluids and rest.  Explained to pt. Message would be routed to primary clinic and Nurse would be returning her call within 1 business day,                Reason for Disposition   [1] HIGH RISK patient (e.g., weak immune system, age > 64 years, obesity with BMI 30 or higher, pregnant, chronic lung disease or other chronic medical condition) AND [2] COVID symptoms (e.g., cough, fever)  (Exceptions: Already seen by PCP and no new or worsening symptoms.)    Additional Information   Negative: SEVERE difficulty breathing (e.g., struggling for each breath, speaks in single words)   Negative: Difficult to  awaken or acting confused (e.g., disoriented, slurred speech)   Negative: Bluish (or gray) lips or face now   Negative: Shock suspected (e.g., cold/pale/clammy skin, too weak to stand, low BP, rapid pulse)   Negative: Sounds like a life-threatening emergency to the triager   Negative: [1] Diagnosed or suspected COVID-19 AND [2] symptoms lasting 3 or more weeks   Negative: [1] COVID-19 exposure AND [2] no symptoms   Negative: COVID-19 vaccine reaction suspected (e.g., fever, headache, muscle aches) occurring 1 to 3 days after getting vaccine   Negative: COVID-19 vaccine, questions about   Negative: [1] Lives with someone known to have influenza (flu test positive) AND [2] flu-like symptoms (e.g., cough, runny nose, sore throat, SOB; with or without fever)   Negative: [1] Possible COVID-19 symptoms AND [2] triager concerned about severity of symptoms or other causes   Negative: COVID-19 and breastfeeding, questions about   Negative: SEVERE or constant chest pain or pressure  (Exception: Mild central chest pain, present only when coughing.)   Negative: MODERATE difficulty breathing (e.g., speaks in phrases, SOB even at rest, pulse 100-120)   Negative: [1] Headache AND [2] stiff neck (can't touch chin to chest)   Negative: Oxygen level (e.g., pulse oximetry) 90 percent or lower   Negative: Chest pain or pressure  (Exception: MILD central chest pain, present only when coughing.)   Negative: [1] Drinking very little AND [2] dehydration suspected (e.g., no urine > 12 hours, very dry mouth, very lightheaded)   Negative: Patient sounds very sick or weak to the triager   Negative: MILD difficulty breathing (e.g., minimal/no SOB at rest, SOB with walking, pulse <100)     Pt. With copd at baseline   Negative: Fever > 103 F (39.4 C)   Negative: [1] Fever > 101 F (38.3 C) AND [2] age > 60 years   Negative: [1] Fever > 100.0 F (37.8 C) AND [2] bedridden (e.g., CVA, chronic illness, recovering from surgery)   Negative: Oxygen level  (e.g., pulse oximetry) 91 to 94 percent    Protocols used: Coronavirus (COVID-19) Diagnosed or [id number]  Nikkie Jiménez RN, Triage Nurse Advisor, 9/1/2024 10:44 PM

## 2024-09-03 ENCOUNTER — MYC MEDICAL ADVICE (OUTPATIENT)
Dept: FAMILY MEDICINE | Facility: OTHER | Age: 59
End: 2024-09-03
Payer: COMMERCIAL

## 2024-09-03 NOTE — TELEPHONE ENCOUNTER
RN COVID TREATMENT VISIT  09/03/24      The patient has been triaged and does not require a higher level of care.    Velma Rizvi  58 year old  Current weight? 175    Has the patient been seen by a primary care provider at an [facility] or [facility] within the past two years? Yes.   Have you been in close proximity to/do you have a known exposure to a person with a confirmed case of influenza? No.     General treatment eligibility:  Date of positive COVID test (PCR or at home)?  9/1/24    Are you or have you been hospitalized for this COVID-19 infection? No.   Have you received monoclonal antibodies or antiviral treatment for COVID-19 since this positive test? No.   Do you have any of the following conditions that place you at risk of being very sick from COVID-19?   - Age 50 years or older  - Chronic lung diseases such as asthma, bronchiectasis, COPD, interstitial lung disease, pulmonary embolism, pulmonary hypertension   - Overweight or Obesity (BMI >85th percentile or BMI 25 or higher)  Yes, patient has at least one high risk condition as noted above.     Current COVID symptoms:   - headache  - congestion or runny nose  Yes. Patient has at least one symptom as selected.     How many days since symptoms started? 5 days or less. Established patient, 12 years or older weighing at least 88.2 lbs, who has symptoms that started in the past 5 days, has not been hospitalized nor received treatment already, and is at risk for being very sick from COVID-19.     Treatment eligibility by RN:  Are you currently pregnant or nursing? No  Do you have a clinically significant hypersensitivity to nirmatrelvir or ritonavir, or toxic epidermal necrolysis (TEN) or Barron-Tino Syndrome? No  Do you have a history of hepatitis, any hepatic impairment on the Problem List (such as Child-Daugherty Class C, cirrhosis, fatty liver disease, alcoholic liver disease), or was the last liver lab (hepatic panel,  ALT, AST, ALK Phos, bilirubin) elevated in the past 6 months? No  Do you have any history of severe renal impairment (eGFR < 30mL/min)? No    Is patient eligible to continue? Yes, patient meets all eligibility requirements for the RN COVID treatment (as denoted by all no responses above).     Current Outpatient Medications   Medication Sig Dispense Refill    albuterol (PROAIR HFA) 108 (90 Base) MCG/ACT inhaler Inhale 2 puffs into the lungs every 4 hours as needed for shortness of breath 18 g 6    azithromycin (ZITHROMAX) 250 MG tablet Two tablets first day, then one tablet daily for four days. 6 tablet 0    Cholecalciferol (VITAMIN D3) 75 MCG (3000 UT) TABS Take 75 mcg by mouth daily      COMPRESSION STOCKINGS 1 each daily Wear daily size large, two pair 2 each 0    Fluticasone-Umeclidin-Vilanterol (TRELEGY ELLIPTA) 200-62.5-25 MCG/ACT oral inhaler Inhale 1 puff into the lungs daily 60 each 5    ibuprofen (ADVIL/MOTRIN) 200 MG tablet Take 800 mg by mouth every 8 hours as needed for pain      ipratropium - albuterol 0.5 mg/2.5 mg/3 mL (DUONEB) 0.5-2.5 (3) MG/3ML neb solution Take 1 vial (3 mLs) by nebulization every 4 hours as needed for shortness of breath, wheezing or cough 90 mL 2    phentermine (ADIPEX-P) 37.5 MG capsule Take 1 capsule (37.5 mg) by mouth every morning 30 capsule 2       Medications from List 1 of the standing order (on medications that exclude the use of Paxlovid) that patient is taking: NONE. Is patient taking [place]'s Wort? No  Is patient taking [place]'s Wort or any meds from List 1? No.   Medications from List 2 of the standing order (on meds that provider needs to adjust) that patient is taking: NONE. Is patient on any of the meds from List 2? No.   Medications from List 3 of standing order (on meds that a RN needs to adjust) that patient is taking: NONE. Is patient on any meds from List 3? No.     Paxlovid has an approximate 90% reduction in hospitalization. Paxlovid can possibly cause  altered sense of taste, diarrhea (loose, watery stools), high blood pressure, muscle aches.     Would patient like a Paxlovid prescription?   Yes.   Lab Results   Component Value Date    GFRESTIMATED >90 06/02/2024       Was last eGFR reduced? No, eGFR 60 or greater/ No Result on record. Patient can receive the normal renal function dose. Paxlovid Rx sent to [facility]   [facility] [place]    Temporary change to home medications: None    All medication adjustments (holds, etc) were discussed with the patient and patient was asked to repeat back (teachback) their med adjustment.  Did patient understand med adjustment? No medication adjustments needed.         Reviewed the following instructions with the patient:    Paxlovid (nimatrelvir and ritonavir)    How it works  Two medicines (nirmatrelvir and ritonavir) are taken together. They stop the virus from growing. Less amount of virus is easier for your body to fight.    How to take  Medicine comes in a daily container with both medicine tablets. Take by mouth twice daily (once in the morning, once at night) for 5 days.  The number of tablets to take varies by patient.  Don't chew or break capsules. Swallow whole.    When to take  Take as soon as possible after positive COVID-19 test result, and within 5 days of your first symptoms.    Possible side effects  Can cause altered sense of taste, diarrhea (loose, watery stools), high blood pressure, muscle aches.    Vilma Paige RN

## 2024-09-06 DIAGNOSIS — J44.9 COPD, MODERATE (H): ICD-10-CM

## 2024-09-09 RX ORDER — ALBUTEROL SULFATE 90 UG/1
2 AEROSOL, METERED RESPIRATORY (INHALATION) EVERY 4 HOURS PRN
Qty: 18 G | Refills: 3 | Status: SHIPPED | OUTPATIENT
Start: 2024-09-09

## 2024-10-10 DIAGNOSIS — E66.812 CLASS 2 OBESITY WITHOUT SERIOUS COMORBIDITY WITH BODY MASS INDEX (BMI) OF 35.0 TO 35.9 IN ADULT, UNSPECIFIED OBESITY TYPE: ICD-10-CM

## 2024-10-11 DIAGNOSIS — R91.8 OPACITY OF LUNG ON IMAGING STUDY: Primary | ICD-10-CM

## 2024-10-11 RX ORDER — PHENTERMINE HYDROCHLORIDE 37.5 MG/1
CAPSULE ORAL
Qty: 30 CAPSULE | Refills: 0 | Status: SHIPPED | OUTPATIENT
Start: 2024-10-11 | End: 2024-11-08

## 2024-10-11 NOTE — TELEPHONE ENCOUNTER
Patient has been notified via voicemail of medication refill called into pharmacy, and visit due. Instructed to schedule physical or office visit for medication follow up.

## 2024-10-12 ENCOUNTER — MYC MEDICAL ADVICE (OUTPATIENT)
Dept: FAMILY MEDICINE | Facility: OTHER | Age: 59
End: 2024-10-12
Payer: COMMERCIAL

## 2024-10-12 ENCOUNTER — HEALTH MAINTENANCE LETTER (OUTPATIENT)
Age: 59
End: 2024-10-12

## 2024-10-18 ENCOUNTER — HOSPITAL ENCOUNTER (OUTPATIENT)
Dept: MAMMOGRAPHY | Facility: CLINIC | Age: 59
Discharge: HOME | End: 2024-10-18
Attending: PHYSICIAN ASSISTANT | Admitting: PHYSICIAN ASSISTANT
Payer: COMMERCIAL

## 2024-10-18 DIAGNOSIS — Z12.31 VISIT FOR SCREENING MAMMOGRAM: ICD-10-CM

## 2024-10-18 PROCEDURE — 77063 BREAST TOMOSYNTHESIS BI: CPT

## 2024-10-25 ENCOUNTER — HOSPITAL ENCOUNTER (OUTPATIENT)
Dept: CT IMAGING | Facility: CLINIC | Age: 59
Discharge: HOME | End: 2024-10-25
Attending: PHYSICIAN ASSISTANT | Admitting: PHYSICIAN ASSISTANT
Payer: COMMERCIAL

## 2024-10-25 DIAGNOSIS — R91.8 OPACITY OF LUNG ON IMAGING STUDY: ICD-10-CM

## 2024-10-25 PROCEDURE — 250N000011 HC RX IP 250 OP 636: Performed by: PHYSICIAN ASSISTANT

## 2024-10-25 PROCEDURE — 71260 CT THORAX DX C+: CPT

## 2024-10-25 PROCEDURE — 250N000009 HC RX 250: Performed by: PHYSICIAN ASSISTANT

## 2024-10-25 RX ORDER — IOPAMIDOL 755 MG/ML
500 INJECTION, SOLUTION INTRAVASCULAR ONCE
Status: COMPLETED | OUTPATIENT
Start: 2024-10-25 | End: 2024-10-25

## 2024-10-25 RX ADMIN — IOPAMIDOL 75 ML: 755 INJECTION, SOLUTION INTRAVENOUS at 15:55

## 2024-10-25 RX ADMIN — SODIUM CHLORIDE 75 ML: 9 INJECTION, SOLUTION INTRAVENOUS at 15:55

## 2024-10-29 ENCOUNTER — PATIENT OUTREACH (OUTPATIENT)
Dept: ONCOLOGY | Facility: CLINIC | Age: 59
End: 2024-10-29
Payer: COMMERCIAL

## 2024-10-29 DIAGNOSIS — R91.8 PULMONARY NODULES: Primary | ICD-10-CM

## 2024-10-29 NOTE — PROGRESS NOTES
New IP (Interventional Pulmonology) referral rec'd.  Chart reviewed.       New Patient: Interventional Pulmonary (Lung nodule) Nurse Navigator Note    Referring provider: Shahid Hong PA-CEr [facility] RYLAN CRUZ    Referred to (specialty): Interventional Pulmonary (Lung nodule)    Requested provider (if applicable): n/a    Date Referral Received: 10/29/2024    Evaluation for :  Lung nodule    Clinical History (per Nurse review of records provided):    **BOOK MARKED**    CT CHEST WITH CONTRAST  10/25/2024 4:03 PM     CLINICAL HISTORY: Opacity of lung on imaging study.     TECHNIQUE: CT chest with IV contrast. Multiplanar reformats were  obtained. Dose reduction techniques were used.     CONTRAST: 75 mL Isovue 370     COMPARISON: Chest x-ray 6/28/2024. CT 6/2/2024.     FINDINGS:   LUNGS AND PLEURA: No effusions. Previously noted severe consolidation  at the right lung base has significantly improved. There are patchy  bibasilar ill-defined nodular opacities. Some of this appears solid at  the right lower lobe for example measuring 20 mm, series 4 image 255.  There are other multifocal groundglass nodular examples bilaterally.  Spiculated solid nodule at the right upper lobe is 10 x 7 mm, stable  when remeasuring the prior exam at a similar level, series 4 image 93.  Emphysema.     MEDIASTINUM/AXILLAE: No acute mediastinal abnormality. No enlarged  mediastinal lymph node. Right hilar lymph node is 11 mm, previously 15  mm, series 3 image 67. No new hilar or other adenopathy identified.     CORONARY ARTERY CALCIFICATION: Mild.     UPPER ABDOMEN: Stable lobulated cystic lesion at the left kidney  without specific imaging follow-up recommended. Stable hypodense  thickening of the left adrenal.     MUSCULOSKELETAL: No focal bone lesion identified.                                                                      IMPRESSION:   1.  Stable but indeterminate spiculated solid nodule at the  right  upper lobe. Neoplasm remains in the differential. See below for  follow-up guidelines.  2.  Slightly smaller right hilar lymph node.  3.  Patchy bibasilar multinodular opacities may relate to an  infectious, inflammatory, or neoplastic etiology. Recommend further  attention to this at imaging follow-up. Previously noted dense  consolidation at the right lung base has improved.  4.  Stable mild nonspecific left adrenal thickening.     Records Location: [facility]     Records Needed: none    Additional testing needed prior to consult: PFT's

## 2024-11-03 SDOH — HEALTH STABILITY: PHYSICAL HEALTH: ON AVERAGE, HOW MANY MINUTES DO YOU ENGAGE IN EXERCISE AT THIS LEVEL?: 20 MIN

## 2024-11-03 SDOH — HEALTH STABILITY: PHYSICAL HEALTH: ON AVERAGE, HOW MANY DAYS PER WEEK DO YOU ENGAGE IN MODERATE TO STRENUOUS EXERCISE (LIKE A BRISK WALK)?: 1 DAY

## 2024-11-03 ASSESSMENT — SOCIAL DETERMINANTS OF HEALTH (SDOH): HOW OFTEN DO YOU GET TOGETHER WITH FRIENDS OR RELATIVES?: MORE THAN THREE TIMES A WEEK

## 2024-11-08 ENCOUNTER — OFFICE VISIT (OUTPATIENT)
Dept: FAMILY MEDICINE | Facility: OTHER | Age: 59
End: 2024-11-08
Payer: COMMERCIAL

## 2024-11-08 VITALS
TEMPERATURE: 97.3 F | HEIGHT: 66 IN | BODY MASS INDEX: 28.85 KG/M2 | RESPIRATION RATE: 16 BRPM | WEIGHT: 179.5 LBS | SYSTOLIC BLOOD PRESSURE: 120 MMHG | HEART RATE: 84 BPM | DIASTOLIC BLOOD PRESSURE: 72 MMHG

## 2024-11-08 DIAGNOSIS — J44.9 COPD, SEVERE (H): ICD-10-CM

## 2024-11-08 DIAGNOSIS — Z00.00 ROUTINE GENERAL MEDICAL EXAMINATION AT A HEALTH CARE FACILITY: Primary | ICD-10-CM

## 2024-11-08 DIAGNOSIS — R23.3 EASY BRUISING: ICD-10-CM

## 2024-11-08 DIAGNOSIS — N76.0 VAGINITIS AND VULVOVAGINITIS: ICD-10-CM

## 2024-11-08 DIAGNOSIS — J44.1 COPD EXACERBATION (H): ICD-10-CM

## 2024-11-08 DIAGNOSIS — E66.812 CLASS 2 OBESITY WITHOUT SERIOUS COMORBIDITY WITH BODY MASS INDEX (BMI) OF 35.0 TO 35.9 IN ADULT, UNSPECIFIED OBESITY TYPE: ICD-10-CM

## 2024-11-08 LAB
BASOPHILS # BLD AUTO: 0.1 10E3/UL (ref 0–0.2)
BASOPHILS NFR BLD AUTO: 1 %
CLUE CELLS: ABNORMAL
EOSINOPHIL # BLD AUTO: 0.2 10E3/UL (ref 0–0.7)
EOSINOPHIL NFR BLD AUTO: 3 %
ERYTHROCYTE [DISTWIDTH] IN BLOOD BY AUTOMATED COUNT: 12.9 % (ref 10–15)
HCT VFR BLD AUTO: 44.3 % (ref 35–47)
HGB BLD-MCNC: 14.7 G/DL (ref 11.7–15.7)
IMM GRANULOCYTES # BLD: 0 10E3/UL
IMM GRANULOCYTES NFR BLD: 0 %
LYMPHOCYTES # BLD AUTO: 1.4 10E3/UL (ref 0.8–5.3)
LYMPHOCYTES NFR BLD AUTO: 18 %
MCH RBC QN AUTO: 30.9 PG (ref 26.5–33)
MCHC RBC AUTO-ENTMCNC: 33.2 G/DL (ref 31.5–36.5)
MCV RBC AUTO: 93 FL (ref 78–100)
MONOCYTES # BLD AUTO: 0.7 10E3/UL (ref 0–1.3)
MONOCYTES NFR BLD AUTO: 9 %
NEUTROPHILS # BLD AUTO: 5.3 10E3/UL (ref 1.6–8.3)
NEUTROPHILS NFR BLD AUTO: 69 %
PLATELET # BLD AUTO: 362 10E3/UL (ref 150–450)
RBC # BLD AUTO: 4.76 10E6/UL (ref 3.8–5.2)
TRICHOMONAS, WET PREP: ABNORMAL
WBC # BLD AUTO: 7.6 10E3/UL (ref 4–11)
WBC'S/HIGH POWER FIELD, WET PREP: ABNORMAL
YEAST, WET PREP: ABNORMAL

## 2024-11-08 PROCEDURE — 99396 PREV VISIT EST AGE 40-64: CPT | Performed by: PHYSICIAN ASSISTANT

## 2024-11-08 PROCEDURE — 87210 SMEAR WET MOUNT SALINE/INK: CPT | Performed by: PHYSICIAN ASSISTANT

## 2024-11-08 PROCEDURE — 99214 OFFICE O/P EST MOD 30 MIN: CPT | Mod: 25 | Performed by: PHYSICIAN ASSISTANT

## 2024-11-08 PROCEDURE — 36415 COLL VENOUS BLD VENIPUNCTURE: CPT | Performed by: PHYSICIAN ASSISTANT

## 2024-11-08 PROCEDURE — 85025 COMPLETE CBC W/AUTO DIFF WBC: CPT | Performed by: PHYSICIAN ASSISTANT

## 2024-11-08 RX ORDER — PHENTERMINE HYDROCHLORIDE 37.5 MG/1
37.5 CAPSULE ORAL EVERY MORNING
Qty: 30 CAPSULE | Refills: 2 | Status: SHIPPED | OUTPATIENT
Start: 2024-11-08

## 2024-11-08 RX ORDER — AZITHROMYCIN 250 MG/1
TABLET, FILM COATED ORAL
Qty: 6 TABLET | Refills: 0 | Status: SHIPPED | OUTPATIENT
Start: 2024-11-08

## 2024-11-08 RX ORDER — ESTRADIOL 10 UG/1
10 INSERT VAGINAL
Qty: 24 TABLET | Refills: 3 | Status: SHIPPED | OUTPATIENT
Start: 2024-11-11

## 2024-11-08 ASSESSMENT — PAIN SCALES - GENERAL: PAINLEVEL_OUTOF10: NO PAIN (0)

## 2024-11-08 NOTE — PATIENT INSTRUCTIONS
Patient Education   Preventive Care Advice   This is general advice given by our system to help you stay healthy. However, your care team may have specific advice just for you. Please talk to your care team about your preventive care needs.  Nutrition  Eat 5 or more servings of fruits and vegetables each day.  Try wheat bread, brown rice and whole grain pasta (instead of white bread, rice, and pasta).  Get enough calcium and vitamin D. Check the label on foods and aim for 100% of the RDA (recommended daily allowance).  Lifestyle  Exercise at least 150 minutes each week  (30 minutes a day, 5 days a week).  Do muscle strengthening activities 2 days a week. These help control your weight and prevent disease.  No smoking.  Wear sunscreen to prevent skin cancer.  Have a dental exam and cleaning every 6 months.  Yearly exams  See your health care team every year to talk about:  Any changes in your health.  Any medicines your care team has prescribed.  Preventive care, family planning, and ways to prevent chronic diseases.  Shots (vaccines)   HPV shots (up to age 26), if you've never had them before.  Hepatitis B shots (up to age 59), if you've never had them before.  COVID-19 shot: Get this shot when it's due.  Flu shot: Get a flu shot every year.  Tetanus shot: Get a tetanus shot every 10 years.  Pneumococcal, hepatitis A, and RSV shots: Ask your care team if you need these based on your risk.  Shingles shot (for age 50 and up)  General health tests  Diabetes screening:  Starting at age 35, Get screened for diabetes at least every 3 years.  If you are younger than age 35, ask your care team if you should be screened for diabetes.  Cholesterol test: At age 39, start having a cholesterol test every 5 years, or more often if advised.  Bone density scan (DEXA): At age 50, ask your care team if you should have this scan for osteoporosis (brittle bones).  Hepatitis C: Get tested at least once in your life.  STIs (sexually  transmitted infections)  Before age 24: Ask your care team if you should be screened for STIs.  After age 24: Get screened for STIs if you're at risk. You are at risk for STIs (including HIV) if:  You are sexually active with more than one person.  You don't use condoms every time.  You or a partner was diagnosed with a sexually transmitted infection.  If you are at risk for HIV, ask about PrEP medicine to prevent HIV.  Get tested for HIV at least once in your life, whether you are at risk for HIV or not.  Cancer screening tests  Cervical cancer screening: If you have a cervix, begin getting regular cervical cancer screening tests starting at age 21.  Breast cancer scan (mammogram): If you've ever had breasts, begin having regular mammograms starting at age 40. This is a scan to check for breast cancer.  Colon cancer screening: It is important to start screening for colon cancer at age 45.  Have a colonoscopy test every 10 years (or more often if you're at risk) Or, ask your provider about stool tests like a FIT test every year or Cologuard test every 3 years.  To learn more about your testing options, visit:   .  For help making a decision, visit:   https://bit.ly/ls30842.  Prostate cancer screening test: If you have a prostate, ask your care team if a prostate cancer screening test (PSA) at age 55 is right for you.  Lung cancer screening: If you are a current or former smoker ages 50 to 80, ask your care team if ongoing lung cancer screenings are right for you.  For informational purposes only. Not to replace the advice of your health care provider. Copyright   2023 [employer]. All rights reserved. Clinically reviewed by the [facility]. "[employer] 151708 - REV 01/24.

## 2024-11-08 NOTE — PROGRESS NOTES
Preventive Care Visit  [facility]  Shahid Hong PA-C, Family Medicine  Nov 8, 2024      Assessment & Plan     Routine general medical examination at a health care facility  - Discussed recommended vaccinations, she declines.     COPD, severe (H)  COPD exacerbation (H)  Continue on Trelegy, Duoneb and Albuterol.  Refill medications as needed.   Today concern as she has been having more productive coughing. Will start on Azithromycin, holding on prednisone for right now.   - azithromycin (ZITHROMAX) 250 MG tablet; Two tablets first day, then one tablet daily for four days.    Vaginitis and vulvovaginitis  She is reporting symptoms consistent with atrophic vaginitis. Will check Wet prep to verify no yeast infection. Discussed atrophy and why this occurs. She has tried lubricants but those are no longer working.  Will try local estrogen to see if this helps.   - Wet preparation; Future  - Wet preparation  - estradiol (VAGIFEM) 10 MCG TABS vaginal tablet; Place 1 tablet (10 mcg) vaginally twice a week.    Class 2 obesity without serious comorbidity with body mass index (BMI) of 35.0 to 35.9 in adult, unspecified obesity type  Weight has been stable.   She is doing well with maintaining dietary changes.   Would like her to start increasing physical activity as this will likely help if she is wanting more weight loss.   Will continue her on 3 more months of Phentermine while she tries to incorporate the physical activity.   - phentermine (ADIPEX-P) 37.5 MG capsule; Take 1 capsule (37.5 mg) by mouth every morning.    Easy bruising  Just the left wrist, she notes that it will resolve but comes back if she does anything that bumps or aggravates the area. There is no swelling, there is no pain. No other lesions anywhere else on body.  Will check platelets and monitor. She notes this is the location she had the steroid injection in February but the symptoms just started about 1 month ago.   - CBC  "with platelets and differential; Future  - CBC with platelets and differential        BMI  Estimated body mass index is 29.12 kg/m  as calculated from the following:    Height as of this encounter: 1.672 m (5' 5.83\").    Weight as of this encounter: 81.4 kg (179 lb 8 oz).   Weight management plan: Discussed healthy diet and exercise guidelines    Counseling  Appropriate preventive services were addressed with this patient via screening, questionnaire, or discussion as appropriate for fall prevention, nutrition, physical activity, Tobacco-use cessation, social engagement, weight loss and cognition.  Checklist reviewing preventive services available has been given to the patient.  Reviewed patient's diet, addressing concerns and/or questions.   She is at risk for lack of exercise and has been provided with information to increase physical activity for the benefit of her well-being.           Floridalma Carreon is a 58 year old, presenting for the following:  Physical        11/8/2024     7:15 AM   Additional Questions   Roomed by Jasmyn   Accompanied by self          HPI      Vaginal Symptoms  Onset/Duration: a couple of weeks  Description:  Vaginal Discharge: none   Itching (Pruritis): No  Burning sensation:  YES- during intercourse   Odor: YES  Accompanying Signs & Symptoms:  Urinary symptoms: No  Abdominal pain: No  Fever: No  History:   Sexually active: YES  New Partner: No  Possibility of Pregnancy:  No  Recent antibiotic use: No  Previous vaginitis issues: YES- uses OTC medications  Precipitating or alleviating factors: intercourse or wiping pain seems to be more on the outside   Therapies tried and outcome: lubricant was helpful at first, now not helpful       Bruising on left wrist, x 4 days      Health Care Directive  Patient does not have a Health Care Directive: Discussed advance care planning with patient; information given to patient to review.      11/3/2024   General Health   How would you rate your " overall physical health? Good   Feel stress (tense, anxious, or unable to sleep) Not at all            11/3/2024   Nutrition   Three or more servings of calcium each day? Yes   Diet: Regular (no restrictions)   How many servings of fruit and vegetables per day? (!) 2-3   How many sweetened beverages each day? 0-1            11/3/2024   Exercise   Days per week of moderate/strenous exercise 1 day   Average minutes spent exercising at this level 20 min      (!) EXERCISE CONCERN      11/3/2024   Social Factors   Frequency of gathering with friends or relatives More than three times a week   Worry food won't last until get money to buy more No   Food not last or not have enough money for food? No   Do you have housing? (Housing is defined as stable permanent housing and does not include staying ouside in a car, in a tent, in an abandoned building, in an overnight shelter, or couch-surfing.) No   Are you worried about losing your housing? No   Lack of transportation? No   Unable to get utilities (heat,electricity)? No   Want help with housing or utility concern? No      (!) HOUSING CONCERN PRESENT      11/3/2024   Fall Risk   Fallen 2 or more times in the past year? No    Trouble with walking or balance? No        Patient-reported          11/3/2024   Dental   Dentist two times every year? Yes            11/3/2024   TB Screening   Were you born outside of the [place]? No              Today's PHQ-2 Score:       2/19/2024     8:22 PM   PHQ-2 ( 1999 [employer])   Q1: Little interest or pleasure in doing things 0    Q2: Feeling down, depressed or hopeless 0    PHQ-2 Score 0   Q1: Little interest or pleasure in doing things Not at all   Q2: Feeling down, depressed or hopeless Not at all   PHQ-2 Score 0       Patient-reported         11/3/2024   Substance Use   Alcohol more than 3/day or more than 7/wk No   Do you use any other substances recreationally? No        Social History     Tobacco Use    Smoking status: Former     Current  packs/day: 0.00     Average packs/day: 1 pack/day for 37.8 years (37.8 ttl pk-yrs)     Types: Cigarettes     Start date: 1985     Quit date: 2023     Years since quittin.7     Passive exposure: Never    Smokeless tobacco: Never    Tobacco comments:     already quit   Vaping Use    Vaping status: Never Used   Substance Use Topics    Alcohol use: Not Currently     Comment: rarely    Drug use: No           10/18/2024   LAST FHS-7 RESULTS   1st degree relative breast or ovarian cancer Yes   Any relative bilateral breast cancer No   Any male have breast cancer No   Any ONE woman have BOTH breast AND ovarian cancer No   Any woman with breast cancer before 50yrs No   2 or more relatives with breast AND/OR ovarian cancer No   2 or more relatives with breast AND/OR bowel cancer No           Mammogram Screening - Mammogram every 1-2 years updated in Health Maintenance based on mutual decision making        11/3/2024   STI Screening   New sexual partner(s) since last STI/HIV test? No        History of abnormal Pap smear: No - age 30- 64 PAP with HPV every 5 years recommended        Latest Ref Rng & Units 2022     8:16 AM 2017     5:16 PM 10/3/2012     5:00 PM   PAP / HPV   PAP  Negative for Intraepithelial Lesion or Malignancy (NILM)      PAP (Historical)   NIL  NIL    HPV 16 DNA Negative Negative  Negative     HPV 18 DNA Negative Negative  Negative     Other HR HPV Negative Negative  Negative       ASCVD Risk   The 10-year ASCVD risk score (Yamileth PINEDA, et al., 2019) is: 1.5%    Values used to calculate the score:      Age: 58 years      Sex: Female      Is Non- : No      Diabetic: No      Tobacco smoker: No      Systolic Blood Pressure: 120 mmHg      Is BP treated: No      HDL Cholesterol: 80 mg/dL      Total Cholesterol: 210 mg/dL           Reviewed and updated as needed this visit by Provider                    Past Medical History:   Diagnosis Date    COPD, severe (H)  2019    Phlebitis and thrombophlebitis 10/12/2012    PONV (postoperative nausea and vomiting)      Past Surgical History:   Procedure Laterality Date    COLONOSCOPY N/A 2018    Procedure: COLONOSCOPY;  COLONOSCOPY;  Surgeon: Paresh Curran MD;  Location:  GI    HC TREATMENT INCOMPLETE  SURG, ANY TRIMESTER      D & C  s/p miscarriage    LAPAROSCOPIC CHOLECYSTECTOMY N/A 2019    Procedure: LAPAROSCOPIC CHOLECYSTECTOMY;  Surgeon: Rob Gonzales DO;  Location:  OR    [facility] NONSPECIFIC PROCEDURE      Right hand surgery- tendon repair     BP Readings from Last 3 Encounters:   24 120/72   24 110/64   24 114/70    Wt Readings from Last 3 Encounters:   24 81.4 kg (179 lb 8 oz)   24 81.9 kg (180 lb 8 oz)   24 81.6 kg (180 lb)                  Patient Active Problem List   Diagnosis    Varicose veins of legs    Obesity, Class I, BMI 30-34.9    Peripheral edema    COPD, severe (H)    Personal history of DVT (deep vein thrombosis)     Past Surgical History:   Procedure Laterality Date    COLONOSCOPY N/A 2018    Procedure: COLONOSCOPY;  COLONOSCOPY;  Surgeon: Paresh Curran MD;  Location:  GI    HC TREATMENT INCOMPLETE  SURG, ANY TRIMESTER      D & C  s/p miscarriage    LAPAROSCOPIC CHOLECYSTECTOMY N/A 2019    Procedure: LAPAROSCOPIC CHOLECYSTECTOMY;  Surgeon: Rob Gonzales DO;  Location:  OR    [facility] NONSPECIFIC PROCEDURE      Right hand surgery- tendon repair       Social History     Tobacco Use    Smoking status: Former     Current packs/day: 0.00     Average packs/day: 1 pack/day for 37.8 years (37.8 ttl pk-yrs)     Types: Cigarettes     Start date: 1985     Quit date: 2023     Years since quittin.7     Passive exposure: Never    Smokeless tobacco: Never    Tobacco comments:     already quit   Substance Use Topics    Alcohol use: Not Currently     Comment: rarely     Family History   Problem Relation Age of  "Onset    Cancer Mother          - lung cancer with mets.    Respiratory Father         asthma    Lung Cancer Brother         Smoker    Hypertension Brother     Diabetes Brother     Cancer Maternal Grandmother         breast- 48 fatal age 51    Hypertension Son     Diabetes Maternal Uncle          Current Outpatient Medications   Medication Sig Dispense Refill    albuterol (VENTOLIN HFA) 108 (90 Base) MCG/ACT inhaler Inhale 2 puffs into the lungs every 4 hours as needed for shortness of breath or wheezing. 18 g 3    azithromycin (ZITHROMAX) 250 MG tablet Two tablets first day, then one tablet daily for four days. 6 tablet 0    Cholecalciferol (VITAMIN D3) 75 MCG (3000 UT) TABS Take 75 mcg by mouth daily      [START ON 2024] estradiol (VAGIFEM) 10 MCG TABS vaginal tablet Place 1 tablet (10 mcg) vaginally twice a week. 24 tablet 3    Fluticasone-Umeclidin-Vilanterol (TRELEGY ELLIPTA) 200-62.5-25 MCG/ACT oral inhaler Inhale 1 puff into the lungs daily 60 each 5    ibuprofen (ADVIL/MOTRIN) 200 MG tablet Take 800 mg by mouth every 8 hours as needed for pain      ipratropium - albuterol 0.5 mg/2.5 mg/3 mL (DUONEB) 0.5-2.5 (3) MG/3ML neb solution Take 1 vial (3 mLs) by nebulization every 4 hours as needed for shortness of breath, wheezing or cough 90 mL 2    phentermine (ADIPEX-P) 37.5 MG capsule Take 1 capsule (37.5 mg) by mouth every morning. 30 capsule 2    COMPRESSION STOCKINGS 1 each daily Wear daily size large, two pair 2 each 0     No Known Allergies      Review of Systems  Constitutional, HEENT, cardiovascular, pulmonary, GI, , musculoskeletal, neuro, skin, endocrine and psych systems are negative, except as otherwise noted.     Objective    Exam  /72   Pulse 84   Temp 97.3  F (36.3  C) (Temporal)   Resp 16   Ht 1.672 m (5' 5.83\")   Wt 81.4 kg (179 lb 8 oz)   LMP 2017 (Exact Date)   BMI 29.12 kg/m     Estimated body mass index is 29.12 kg/m  as calculated from the following:    " "Height as of this encounter: 1.672 m (5' 5.83\").    Weight as of this encounter: 81.4 kg (179 lb 8 oz).    Physical Exam  GENERAL: alert and no distress  EYES: Eyes grossly normal to inspection, PERRL and conjunctivae and sclerae normal  HENT: ear canals and TM's normal, nose and mouth without ulcers or lesions  NECK: no adenopathy, no asymmetry, masses, or scars  RESP: Good air motion throughout, there are some faint crackles on left lower lung field and right lower lung field.   CV: regular rate and rhythm, normal S1 S2, no S3 or S4, no murmur, click or rub, no peripheral edema  ABDOMEN: soft, nontender, no hepatosplenomegaly, no masses and bowel sounds normal  MS: no gross musculoskeletal defects noted, no edema. LEFT WRIST: Left wrist there is dark purple macular appearing contusion over the radial aspect of the wrist. It is not palpable. There is full active ROM of the wrist, there is no tenderness to palpation over the wrist.   SKIN: no suspicious lesions or rashes other than the wrist, see above.   NEURO: Normal strength and tone, mentation intact and speech normal  PSYCH: mentation appears normal, affect normal/bright        Signed Electronically by: Shahid Hong PA-C    "

## 2024-11-11 ENCOUNTER — ANCILLARY PROCEDURE (OUTPATIENT)
Dept: ULTRASOUND IMAGING | Facility: CLINIC | Age: 59
End: 2024-11-11
Attending: SURGERY
Payer: COMMERCIAL

## 2024-11-11 ENCOUNTER — OFFICE VISIT (OUTPATIENT)
Dept: VASCULAR SURGERY | Facility: CLINIC | Age: 59
End: 2024-11-11
Attending: SURGERY
Payer: COMMERCIAL

## 2024-11-11 ENCOUNTER — ONCOLOGY VISIT (OUTPATIENT)
Dept: PULMONOLOGY | Facility: CLINIC | Age: 59
End: 2024-11-11
Attending: PHYSICIAN ASSISTANT
Payer: COMMERCIAL

## 2024-11-11 VITALS
WEIGHT: 181 LBS | SYSTOLIC BLOOD PRESSURE: 114 MMHG | DIASTOLIC BLOOD PRESSURE: 75 MMHG | BODY MASS INDEX: 29.09 KG/M2 | OXYGEN SATURATION: 98 % | HEIGHT: 66 IN | HEART RATE: 88 BPM

## 2024-11-11 DIAGNOSIS — J44.9 COPD, MODERATE (H): ICD-10-CM

## 2024-11-11 DIAGNOSIS — R91.8 PULMONARY NODULES: ICD-10-CM

## 2024-11-11 DIAGNOSIS — I83.812 VARICOSE VEINS OF LEFT LOWER EXTREMITY WITH PAIN: ICD-10-CM

## 2024-11-11 DIAGNOSIS — I83.812 VARICOSE VEINS OF LEFT LOWER EXTREMITY WITH PAIN: Primary | ICD-10-CM

## 2024-11-11 DIAGNOSIS — R91.8 OPACITY OF LUNG ON IMAGING STUDY: ICD-10-CM

## 2024-11-11 PROCEDURE — 99204 OFFICE O/P NEW MOD 45 MIN: CPT | Performed by: PHYSICIAN ASSISTANT

## 2024-11-11 PROCEDURE — 99213 OFFICE O/P EST LOW 20 MIN: CPT | Performed by: SURGERY

## 2024-11-11 PROCEDURE — 93971 EXTREMITY STUDY: CPT | Mod: LT | Performed by: SURGERY

## 2024-11-11 ASSESSMENT — PAIN SCALES - GENERAL: PAINLEVEL_OUTOF10: NO PAIN (0)

## 2024-11-11 NOTE — PATIENT INSTRUCTIONS
Call [place] to make a General Pulmonary appt  [phone]    Stop Trelegy, switch to Anoro. If worsening, call or mychart and we will resume Trelegy at a lower dose   Albuterol in the morning 1-2 puffs, aerobika, and then use the Anoro. You can also use mucinex over the counter 600-1200mg twice daily     Follow up 3 months with repeat CT chest

## 2024-11-11 NOTE — PROGRESS NOTES
LUNG NODULE & INTERVENTIONAL PULMONARY CLINIC  [facility], [facility]     Velma Rizvi MRN# [id number]   Age: 58 year old YOB: 1965     Reason for Consultation: lung nodule(s)    Requesting Physician: Shahid Hong PA-C  [address] [place] [address]       Assessment and Plan:    1. RUL 10 x 7mm nodule. Not present on CT chest 2022. CT chest 2023 demonstrated large consolidative opacity in this area. Improved on CT chest 2024, and further decreased on CT chest 2024 and 10/2024. I suspect this represents residual scar from 2023. Continue to monitor, biopsy would be at high risk for pneumothorax since she has a lot of surrounding emphysema.     2. RLL solid 20mm nodule, Multifocal groundglass nodules. CT chest 2024 with extensive patchy opacites primarily in the RLL, treated in the ED for pneumonia at that time. Improved on CT chest 10/2024, with residual nodule RLL nodule and groundglass opacities. Could be sequelae from prior infection but malignancy remains on the differential. Especially given her strong fmhx lung cancer (brother, mom  of lung cancer age 55). Will monitor closely with repeat CT chest 3 months.     3. COPD. PFTs 2019 with moderately severe obstruction, 12% bronchodilator response. Air trapping, hyperinflation, and moderately reduced diffusion. She saw Dr. Leroy, Pulmonology at [facility] 2023, spirometry demonstrated worsening PFTs (FEV1 1.9 ? 1.27L). Triple inhaler therapy regimen continued. Plan was for short term 3 month follow up and a1at testing at that time, but it appears she was lost to follow up. I recommend she re-establish care, consider chronic azithromycin and alpha 1 testing. Number provided for patient to arrange appt.   On a day-to-day basis she doesn't have significant symptoms from her COPD and is getting pneumonia fairly frequently. On Trelegy 200. ICS seems to be causing some voice hoarseness, and can slightly  increase risk of pneumonia. Will de-escalate to Anoro but if symptoms worse, will switch to low dose Trelegy 100. Recommended pulmonary toilet with aerobika and mucinex.      Follow up 3 months with repeat CT chest w/o    Yolanda Cisse PA-C  Interventional and General Pulmonology  Department of Pulmonary, Allergy, Critical Care and Sleep Medicine   [facility]     45 minutes spent reviewing chart, reviewing test results, talking with and examining patient, formulating plan, and documentation on the day of the encounter.          History:     Velma Rizvi is a 58 year old female with who is here for lung nodule(s).    Had pneumonia 10/2023 and 2024. Coughing up yellow stuff, started on zpack  which is helping. Otherwise not getting prednisone and antibiotics often. Has night sweats from menopause. On phentermine for intentional weight loss. No fevers or chills. Rarely short of breath. No trouble swallowing. Sometimes loses her voice. Not triggered by strong scents/perfumes. 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     Using Trelegy 200 1 puff daily, using albuterol once daily on avg. She recalls Anoro being more helpful than Trelegy.     - Personal hx of cancer: no  - Family hx of cancer: brother  from small cell lung cancer age 55, he was a smoker. Mother also  from lung cancer age 55.   - Exposure hx: Denies asbestos or radon exposure , no known TB exposures  - Tobacco hx: Smoked 0.5-1ppd for 37 years, quit 2023 with chantix  - Images reviewed this visit: RUL and RLL lung nodule, multifocal groundglass opacities.      Other pertinent active medical problems include:   - COPD, on Trelegy and Prn albuterol         Past Medical History:      Past Medical History:   Diagnosis Date    COPD, severe (H) 2019    Phlebitis and thrombophlebitis 10/12/2012    PONV (postoperative nausea and vomiting)            Past  Surgical History:      Past Surgical History:   Procedure Laterality Date    COLONOSCOPY N/A 2018    Procedure: COLONOSCOPY;  COLONOSCOPY;  Surgeon: Paresh Curran MD;  Location: [facility] GI    HC TREATMENT INCOMPLETE  SURG, ANY TRIMESTER      D & C  s/p miscarriage    LAPAROSCOPIC CHOLECYSTECTOMY N/A 2019    Procedure: LAPAROSCOPIC CHOLECYSTECTOMY;  Surgeon: Rob Gonzales DO;  Location: [facility] OR    [facility] NONSPECIFIC PROCEDURE      Right hand surgery- tendon repair          Social History:     Social History     Tobacco Use    Smoking status: Former     Current packs/day: 0.00     Average packs/day: 1 pack/day for 37.8 years (37.8 ttl pk-yrs)     Types: Cigarettes     Start date: 1985     Quit date: 2023     Years since quittin.7     Passive exposure: Never    Smokeless tobacco: Never    Tobacco comments:     already quit   Substance Use Topics    Alcohol use: Not Currently     Comment: rarely          Family History:     Family History   Problem Relation Age of Onset    Cancer Mother          - lung cancer with mets.    Respiratory Father         asthma    Lung Cancer Brother         Smoker    Hypertension Brother     Diabetes Brother     Cancer Maternal Grandmother         breast- 48 fatal age 51    Hypertension Son     Diabetes Maternal Uncle            Allergies:    No Known Allergies       Medications:     Current Outpatient Medications   Medication Sig Dispense Refill    albuterol (VENTOLIN HFA) 108 (90 Base) MCG/ACT inhaler Inhale 2 puffs into the lungs every 4 hours as needed for shortness of breath or wheezing. 18 g 3    azithromycin (ZITHROMAX) 250 MG tablet Two tablets first day, then one tablet daily for four days. 6 tablet 0    Cholecalciferol (VITAMIN D3) 75 MCG (3000 UT) TABS Take 75 mcg by mouth daily      COMPRESSION STOCKINGS 1 each daily Wear daily size large, two pair 2 each 0    estradiol (VAGIFEM) 10 MCG TABS vaginal tablet Place 1 tablet (10 mcg)  vaginally twice a week. 24 tablet 3    Fluticasone-Umeclidin-Vilanterol (TRELEGY ELLIPTA) 200-62.5-25 MCG/ACT oral inhaler Inhale 1 puff into the lungs daily 60 each 5    ibuprofen (ADVIL/MOTRIN) 200 MG tablet Take 800 mg by mouth every 8 hours as needed for pain      ipratropium - albuterol 0.5 mg/2.5 mg/3 mL (DUONEB) 0.5-2.5 (3) MG/3ML neb solution Take 1 vial (3 mLs) by nebulization every 4 hours as needed for shortness of breath, wheezing or cough 90 mL 2    phentermine (ADIPEX-P) 37.5 MG capsule Take 1 capsule (37.5 mg) by mouth every morning. 30 capsule 2     Current Facility-Administered Medications   Medication Dose Route Frequency Provider Last Rate Last Admin    1.0 mL bupivacaine (MARCAINE) 0.5% injection (50 mL vial)  1 mL   Sharif Dawn MD   1 mL at 02/28/24 1631    lidocaine 1 % injection 1 mL  1 mL   Sharif Dawn MD   1 mL at 02/28/24 1631    triamcinolone (KENALOG-40) injection 20 mg  20 mg   Sharif Dawn MD   20 mg at 02/28/24 1631            Physical Exam:   LMP 03/14/2017 (Exact Date)   Wt Readings from Last 4 Encounters:   11/08/24 81.4 kg (179 lb 8 oz)   06/28/24 81.9 kg (180 lb 8 oz)   06/02/24 81.6 kg (180 lb)   02/28/24 88.5 kg (195 lb)     General: Well appearing  Lungs: Nonlabored breathing  Neuro: Answering questions appropriately  Psych: Normal affect     Imaging/Lab Data   All laboratory and imaging data reviewed.    No results found for this or any previous visit (from the past 24 hours).

## 2024-11-11 NOTE — NURSING NOTE
"Chief Complaint   Patient presents with    New Patient     She requests these members of her care team be copied on today's visit information:  PCP: Shahid Hong    Referring Provider:  Shahid Hong PA-C  [address] [place] [id number]    Vitals:    11/11/24 1305   BP: 114/75   Pulse: 88   SpO2: 98%   Weight: 82.1 kg (181 lb)   Height: 1.671 m (5' 5.8\")     Body mass index is 29.39 kg/m .    Medications were reconciled.    Teri Penaloza      "

## 2024-11-11 NOTE — PROGRESS NOTES
"[facility] [place] Progress Note    Velma Rizvi presents in follow-up of radiofrequency ablation of her left great saphenous vein on 3/14/2024.  Overall she states that her leg pain and, especially, the swelling have improved.  Unfortunate, she continues to have some numbness of her medial thigh and extending onto the medial calf.  She admits to some \"pinpricks\" sporadically on the left medial thigh.    Physical Exam  General: Pleasant female in no acute distress.   Extremities: Left lower extremity: No significant, visible varicose veins.  No significant edema.    Ultrasound:  Great saphenous vein is close 4 mm distal to the saphenofemoral junction down to the proximal calf.    No deep vein thrombosis    Assessment:  Doing well.  I reassured her that the numbness of the thigh will heal for up to a year.  The pinpricks that she is experiencing likely related to healing of the nerves.    Plan:  She will return on an as-needed basis.    Torres Morris MD, FACS    Dictated using Dragon voice recognition software which may result in transcription errors        "

## 2024-11-11 NOTE — LETTER
"11/11/2024      Velma Rizvi  [address] [facility] [address]      Dear Colleague,    Thank you for referring your patient, Velma Rizvi, to the [facility] [place]. Please see a copy of my visit note below.    [facility] Progress Note    Velma Rizvi presents in follow-up of radiofrequency ablation of her left great saphenous vein on 3/14/2024.  Overall she states that her leg pain and, especially, the swelling have improved.  Unfortunate, she continues to have some numbness of her medial thigh and extending onto the medial calf.  She admits to some \"pinpricks\" sporadically on the left medial thigh.    Physical Exam  General: Pleasant female in no acute distress.   Extremities: Left lower extremity: No significant, visible varicose veins.  No significant edema.    Ultrasound:  Great saphenous vein is close 4 mm distal to the saphenofemoral junction down to the proximal calf.    No deep vein thrombosis    Assessment:  Doing well.  I reassured her that the numbness of the thigh will heal for up to a year.  The pinpricks that she is experiencing likely related to healing of the nerves.    Plan:  She will return on an as-needed basis.    Torres Morris MD, FACS    Dictated using Dragon voice recognition software which may result in transcription errors          Again, thank you for allowing me to participate in the care of your patient.        Sincerely,        Torres Morris MD  "

## 2024-11-13 ENCOUNTER — HOSPITAL ENCOUNTER (OUTPATIENT)
Dept: RESPIRATORY THERAPY | Facility: CLINIC | Age: 59
Discharge: HOME | End: 2024-11-13
Attending: INTERNAL MEDICINE | Admitting: INTERNAL MEDICINE
Payer: COMMERCIAL

## 2024-11-13 DIAGNOSIS — R91.8 PULMONARY NODULES: ICD-10-CM

## 2024-11-13 PROCEDURE — 94726 PLETHYSMOGRAPHY LUNG VOLUMES: CPT

## 2024-11-13 PROCEDURE — 94729 DIFFUSING CAPACITY: CPT

## 2024-11-13 PROCEDURE — 94010 BREATHING CAPACITY TEST: CPT

## 2024-11-13 NOTE — DISCHARGE INSTRUCTIONS
Thank you for completing pulmonary function testing today.  All results will be scanned into your epic results for your doctor to review.  Please resume taking all your current prescribed medications and diet as directed by your provider.   If you have not heard from your provider about your testing within two weeks and do not have a follow-up appointment scheduled with them please contact your provider about any questions you have concerning your testing.   Thank you  [facility]

## 2024-11-14 LAB
DLCOCOR-%PRED-PRE: 67 %
DLCOCOR-PRE: 15.32 ML/MIN/MMHG
DLCOUNC-%PRED-PRE: 70 %
DLCOUNC-PRE: 15.9 ML/MIN/MMHG
DLCOUNC-PRED: 22.6 ML/MIN/MMHG
ERV-%PRED-PRE: 44 %
ERV-PRE: 0.6 L
ERV-PRED: 1.33 L
EXPTIME-PRE: 11.04 SEC
FEF2575-%PRED-PRE: 45 %
FEF2575-PRE: 1.15 L/SEC
FEF2575-PRED: 2.54 L/SEC
FEFMAX-%PRED-PRE: 68 %
FEFMAX-PRE: 4.95 L/SEC
FEFMAX-PRED: 7.25 L/SEC
FEV1-%PRED-PRE: 88 %
FEV1-PRE: 2.5 L
FEV1FEV6-PRE: 64 %
FEV1FEV6-PRED: 81 %
FEV1FVC-PRE: 60 %
FEV1FVC-PRED: 80 %
FEV1SVC-PRE: 60 %
FEV1SVC-PRED: 74 %
FIFMAX-PRE: 3.71 L/SEC
FRCPLETH-%PRED-PRE: 99 %
FRCPLETH-PRE: 2.96 L
FRCPLETH-PRED: 2.98 L
FVC-%PRED-PRE: 117 %
FVC-PRE: 4.16 L
FVC-PRED: 3.56 L
IC-%PRED-PRE: 134 %
IC-PRE: 3.57 L
IC-PRED: 2.65 L
RVPLETH-%PRED-PRE: 113 %
RVPLETH-PRE: 2.37 L
RVPLETH-PRED: 2.08 L
TLCPLETH-%PRED-PRE: 113 %
TLCPLETH-PRE: 6.54 L
TLCPLETH-PRED: 5.78 L
VA-%PRED-PRE: 107 %
VA-PRE: 6.01 L
VC-%PRED-PRE: 109 %
VC-PRE: 4.17 L
VC-PRED: 3.82 L

## 2024-11-27 ENCOUNTER — MYC MEDICAL ADVICE (OUTPATIENT)
Dept: PULMONOLOGY | Facility: CLINIC | Age: 59
End: 2024-11-27
Payer: COMMERCIAL

## 2024-11-27 DIAGNOSIS — J44.9 COPD, MODERATE (H): Primary | ICD-10-CM

## 2024-12-02 RX ORDER — FLUTICASONE FUROATE, UMECLIDINIUM BROMIDE AND VILANTEROL TRIFENATATE 100; 62.5; 25 UG/1; UG/1; UG/1
1 POWDER RESPIRATORY (INHALATION) DAILY
Qty: 180 EACH | Refills: 3 | Status: SHIPPED | OUTPATIENT
Start: 2024-12-02

## 2025-02-11 ENCOUNTER — HOSPITAL ENCOUNTER (OUTPATIENT)
Dept: CT IMAGING | Facility: CLINIC | Age: 60
Discharge: HOME | End: 2025-02-11
Attending: PHYSICIAN ASSISTANT
Payer: COMMERCIAL

## 2025-02-11 DIAGNOSIS — R91.8 PULMONARY NODULES: ICD-10-CM

## 2025-02-11 PROCEDURE — 71250 CT THORAX DX C-: CPT

## 2025-04-21 ENCOUNTER — OFFICE VISIT (OUTPATIENT)
Dept: FAMILY MEDICINE | Facility: OTHER | Age: 60
End: 2025-04-21
Payer: COMMERCIAL

## 2025-04-21 VITALS
HEIGHT: 66 IN | WEIGHT: 190 LBS | HEART RATE: 74 BPM | DIASTOLIC BLOOD PRESSURE: 80 MMHG | RESPIRATION RATE: 16 BRPM | TEMPERATURE: 98.2 F | SYSTOLIC BLOOD PRESSURE: 126 MMHG | BODY MASS INDEX: 30.53 KG/M2

## 2025-04-21 DIAGNOSIS — K59.00 CONSTIPATION, UNSPECIFIED CONSTIPATION TYPE: ICD-10-CM

## 2025-04-21 DIAGNOSIS — K21.9 GASTROESOPHAGEAL REFLUX DISEASE WITHOUT ESOPHAGITIS: Primary | ICD-10-CM

## 2025-04-21 PROCEDURE — 3074F SYST BP LT 130 MM HG: CPT | Performed by: PHYSICIAN ASSISTANT

## 2025-04-21 PROCEDURE — 1126F AMNT PAIN NOTED NONE PRSNT: CPT | Performed by: PHYSICIAN ASSISTANT

## 2025-04-21 PROCEDURE — 3079F DIAST BP 80-89 MM HG: CPT | Performed by: PHYSICIAN ASSISTANT

## 2025-04-21 PROCEDURE — 99213 OFFICE O/P EST LOW 20 MIN: CPT | Performed by: PHYSICIAN ASSISTANT

## 2025-04-21 RX ORDER — OMEPRAZOLE 40 MG/1
40 CAPSULE, DELAYED RELEASE ORAL DAILY
Qty: 90 CAPSULE | Refills: 1 | Status: SHIPPED | OUTPATIENT
Start: 2025-04-21

## 2025-04-21 ASSESSMENT — PAIN SCALES - GENERAL: PAINLEVEL_OUTOF10: NO PAIN (0)

## 2025-04-21 ASSESSMENT — ENCOUNTER SYMPTOMS: ABDOMINAL PAIN: 1

## 2025-04-21 NOTE — PATIENT INSTRUCTIONS
FOR SEVERE ABDOMINAL PAIN- AGGRESSIVE MANAGMENT:  Then mix Miralax or Powderlax- 13 capfuls- in 64 ounces of fluid (shake and let dissolve over 2-3 minutes).  Drink this over 3-6 hours  You should expect a large stool within 24 hours and relief of pain.      STOP the phentermine  Start Omeprazole 1 tablet daily.

## 2025-04-21 NOTE — PROGRESS NOTES
Assessment & Plan     Gastroesophageal reflux disease without esophagitis  Constipation  Patient having burning epigastric pain for the last 3-4 weeks. Stopped taking Miralax for chronic constipation 4 weeks ago due to having weight gain after using. Differential includes GERD, gastritis, esophagitis, ulcer, constipation.  Patient instructed on bowel regimen to aid constipation, discussed weight gain is likely more due to fluid shifts than it is due to to fat tissue increase. PPI trial ordered to take for 1 month and patient will update with improvement. Concern that her phentermine is making the constipation and dry mouth worse. We will have her discontinue the Phentermine for a while since it is not helping right now with appetite control. First work on resolving constipation and reflux, once resolved consider restarting on phentermine at lower dose with Topiramate.    - omeprazole (PRILOSEC) 40 MG DR capsule; Take 1 capsule (40 mg) by mouth daily.    Options for treatment and follow-up care were reviewed with the patient and/or guardian. Patient and/or guardian engaged in the decision making process and verbalized understanding of the options discussed and agreed with the final plan.      Floridalma Carreon is a 59 year old, presenting for the following health issues:  Abdominal Pain    History of Present Illness       Reason for visit:  Upper chest pain  Symptom onset:  3-4 weeks ago  Symptom intensity:  Severe  Symptom progression:  Staying the same  Had these symptoms before:  No  What makes it better:  Taking a bunch of Tums somewhat helps and try to make myself burp a lot   She is taking medications regularly.  Patient presenting to clinic with concerns of reflux. Patient reports having epigastric pain off and on all day for the last 3-4 weeks. Patient will experience this pain as a burning pain throughout the day but will be a sharp pain at night. She has tried Pepto-bismol without relief. She has been  taking 6-8 Tums throughout the day, every day with minimal relief. She also notes belching helps with the pain. Patient denies n/v/d, having a sour taste in her mouth, chest pain, or a cough that is different than her cough from COPD. She does not have her gallbladder.     Patient notes having constipation chronically. She took Miralax 1 capful/day for a week about a month ago which helped. She reports gaining weight when she did this and stopped taking Miralax after that. She reports she is constipated now. Patient is also on phentermine for appetite suppression.    Nutrition consisted of protein shakes and an apple for breakfast, salads with chicken for lunch, and dinners vary from pastas or hamburgers. Activity consists of walking everyday.           Pain History:  When did you first notice your pain? 3-4 weeks   Have you seen anyone else for your pain? No  How has your pain affected your ability to work? Not applicable  Where in your body do you have pain? Abdominal/Flank Pain  Onset/Duration: 3-4 weeks.   Description:   Character: Stabbing one time. The pain could be described as just irritating.   Location: epigastric region, underneath breastbone.   Radiation: None  Intensity: 0/10 today.   Progression of Symptoms:  intermittent  Accompanying Signs & Symptoms:  Fever/Chills: No  Gas/Bloating: YES- gas and some bloating.   Nausea: No  Vomitting: No  Diarrhea: No  Constipation: YES- she is having hard stools, pain when having bowel movement and struggling the last couple of days. Frequency of bowel movements, a couple of times a week.   Dysuria or Hematuria: No  History:   Trauma: N/A  Previous similar pain: No  Previous tests done: Colonoscopy and Ultrasound.   Precipitating factors:   Does the pain change with:     Food: YES- maybe has noticed with spaghetti sauce.     Bowel Movement: YES- maybe feels a little better.    Urination: No   Other factors:  YES- spaghetti sauce?   Therapies tried and outcome:  "Tums, omeprazole    Patient's last menstrual period was 03/14/2017 (exact date).            Review of Systems  Constitutional, HEENT, cardiovascular, pulmonary, and GI systems are negative, except as otherwise noted.      Objective    /80   Pulse 74   Temp 98.2  F (36.8  C) (Temporal)   Resp 16   Ht 1.665 m (5' 5.55\")   Wt 86.2 kg (190 lb)   LMP 03/14/2017 (Exact Date)   BMI 31.09 kg/m    Body mass index is 31.09 kg/m .  Physical Exam   GENERAL: alert and no distress  RESP: lungs clear to auscultation - no rales, rhonchi or wheezes  CV: regular rate and rhythm, normal S1 S2, no S3 or S4, no murmur, click or rub, no peripheral edema  ABDOMEN: soft, nontender, tenderness epigastric, suprapubic, LLQ, and umbilical, no Hepatosplenomegaly, no abdominal rigidity or rebound and bowel sounds normal  MS: no gross musculoskeletal defects noted, no edema  PSYCH: mentation appears normal, affect normal/bright          Seen by Emiliana MULLINS    IShahid PA-C, was present with the Physician Assistant student who participated in the service and in the documentation of the note.  I have verified the history and personally performed the physical exam and medical decision making.  I agree with the assessment and plan of care as documented in the note.     Signed Electronically by: Shahid Hong PA-C    "

## 2025-05-02 ENCOUNTER — MYC MEDICAL ADVICE (OUTPATIENT)
Dept: FAMILY MEDICINE | Facility: OTHER | Age: 60
End: 2025-05-02
Payer: COMMERCIAL

## 2025-05-02 DIAGNOSIS — E66.812 CLASS 2 OBESITY WITHOUT SERIOUS COMORBIDITY WITH BODY MASS INDEX (BMI) OF 35.0 TO 35.9 IN ADULT, UNSPECIFIED OBESITY TYPE: Primary | ICD-10-CM

## 2025-05-02 RX ORDER — NALTREXONE HYDROCHLORIDE 50 MG/1
TABLET, FILM COATED ORAL
Qty: 27 TABLET | Refills: 0 | Status: SHIPPED | OUTPATIENT
Start: 2025-05-02 | End: 2025-05-05

## 2025-05-02 RX ORDER — BUPROPION HYDROCHLORIDE 100 MG/1
TABLET, EXTENDED RELEASE ORAL
Qty: 53 TABLET | Refills: 0 | Status: SHIPPED | OUTPATIENT
Start: 2025-05-02 | End: 2025-06-01

## 2025-05-02 NOTE — TELEPHONE ENCOUNTER
Pharmacy calling needing clarification on Naltrexone order; TWO sets of instructions on Naltrexone order    naltrexone (DEPADE/REVIA) 50 MG tablet 27 tablet 0 5/2/2025 6/1/2025 No   Sig - Route: Take 0.5 tablets (25 mg) by mouth daily for 8 days, THEN 1 tablet (50 mg) daily for 22 days. Naltrexone 50 mg tablet: 0.5 tablet by mouth daily in AM for 7 days then 0.5 tablet BID (AM and early PM) thereafter. Take with bupropion.. - Oral       Take 0.5 tablets (25 mg) by mouth daily for 8 days, THEN 1 tablet (50 mg) daily for 22 days.     OR    Naltrexone 50 mg tablet: 0.5 tablet by mouth daily in AM for 7 days then 0.5 tablet BID (AM and early PM) thereafter. Take with bupropion.. - Oral    Please clarify orders and send update to pharmacy.     Tiana Truong, RN, BSN

## 2025-05-02 NOTE — TELEPHONE ENCOUNTER
Patient seen 4/21, phentermine discontinued due to worsening GERD/constipation issues.     Tiana Truong, RN, BSN

## 2025-05-05 RX ORDER — NALTREXONE HYDROCHLORIDE 50 MG/1
TABLET, FILM COATED ORAL
Status: SHIPPED
Start: 2025-05-05 | End: 2025-05-05

## 2025-05-05 RX ORDER — NALTREXONE HYDROCHLORIDE 50 MG/1
TABLET, FILM COATED ORAL
Qty: 27 TABLET | Refills: 0 | Status: SHIPPED | OUTPATIENT
Start: 2025-05-05

## 2025-05-05 NOTE — TELEPHONE ENCOUNTER
These are the instructions wanted: Naltrexone 50 mg tablet: 0.5 tablet by mouth daily in AM for 7 days then 0.5 tablet BID (AM and early PM) hawk Hong PA-C

## 2025-05-05 NOTE — TELEPHONE ENCOUNTER
"RN confirmed with pharmacy they received the correct script. Pharmacy did not receive script and RN noted this was \"local print out.\" Resent this script as eprescribe.     LENARD BradfordN, RN      "

## 2025-06-02 ENCOUNTER — HOSPITAL ENCOUNTER (EMERGENCY)
Facility: CLINIC | Age: 60
Discharge: HOME | End: 2025-06-02
Attending: STUDENT IN AN ORGANIZED HEALTH CARE EDUCATION/TRAINING PROGRAM | Admitting: STUDENT IN AN ORGANIZED HEALTH CARE EDUCATION/TRAINING PROGRAM
Payer: COMMERCIAL

## 2025-06-02 ENCOUNTER — APPOINTMENT (OUTPATIENT)
Dept: GENERAL RADIOLOGY | Facility: CLINIC | Age: 60
End: 2025-06-02
Attending: EMERGENCY MEDICINE
Payer: COMMERCIAL

## 2025-06-02 VITALS
HEIGHT: 66 IN | WEIGHT: 187 LBS | OXYGEN SATURATION: 92 % | SYSTOLIC BLOOD PRESSURE: 92 MMHG | TEMPERATURE: 98.5 F | HEART RATE: 85 BPM | BODY MASS INDEX: 30.05 KG/M2 | RESPIRATION RATE: 21 BRPM | DIASTOLIC BLOOD PRESSURE: 58 MMHG

## 2025-06-02 DIAGNOSIS — J18.9 MULTIFOCAL PNEUMONIA: ICD-10-CM

## 2025-06-02 LAB
ALBUMIN SERPL BCG-MCNC: 3.9 G/DL (ref 3.5–5.2)
ALP SERPL-CCNC: 70 U/L (ref 40–150)
ALT SERPL W P-5'-P-CCNC: 36 U/L (ref 0–50)
ANION GAP SERPL CALCULATED.3IONS-SCNC: 11 MMOL/L (ref 7–15)
AST SERPL W P-5'-P-CCNC: 29 U/L (ref 0–45)
ATRIAL RATE - MUSE: 88 BPM
BASOPHILS # BLD AUTO: 0.1 10E3/UL (ref 0–0.2)
BASOPHILS NFR BLD AUTO: 0 %
BILIRUB SERPL-MCNC: 0.8 MG/DL
BUN SERPL-MCNC: 9.6 MG/DL (ref 6–20)
CALCIUM SERPL-MCNC: 9.1 MG/DL (ref 8.8–10.4)
CHLORIDE SERPL-SCNC: 101 MMOL/L (ref 98–107)
CREAT SERPL-MCNC: 0.55 MG/DL (ref 0.51–0.95)
D DIMER PPP FEU-MCNC: 0.28 UG/ML FEU (ref 0–0.5)
DIASTOLIC BLOOD PRESSURE - MUSE: NORMAL MMHG
EGFRCR SERPLBLD CKD-EPI 2021: >90 ML/MIN/1.73M2
EOSINOPHIL # BLD AUTO: 0 10E3/UL (ref 0–0.7)
EOSINOPHIL NFR BLD AUTO: 0 %
ERYTHROCYTE [DISTWIDTH] IN BLOOD BY AUTOMATED COUNT: 13.1 % (ref 10–15)
GLUCOSE SERPL-MCNC: 158 MG/DL (ref 70–99)
HCO3 SERPL-SCNC: 23 MMOL/L (ref 22–29)
HCT VFR BLD AUTO: 38.7 % (ref 35–47)
HGB BLD-MCNC: 13.3 G/DL (ref 11.7–15.7)
IMM GRANULOCYTES # BLD: 0.2 10E3/UL
IMM GRANULOCYTES NFR BLD: 1 %
INTERPRETATION ECG - MUSE: NORMAL
LYMPHOCYTES # BLD AUTO: 0.5 10E3/UL (ref 0.8–5.3)
LYMPHOCYTES NFR BLD AUTO: 3 %
MAGNESIUM SERPL-MCNC: 1.9 MG/DL (ref 1.7–2.3)
MCH RBC QN AUTO: 31.3 PG (ref 26.5–33)
MCHC RBC AUTO-ENTMCNC: 34.4 G/DL (ref 31.5–36.5)
MCV RBC AUTO: 91 FL (ref 78–100)
MONOCYTES # BLD AUTO: 1 10E3/UL (ref 0–1.3)
MONOCYTES NFR BLD AUTO: 5 %
NEUTROPHILS # BLD AUTO: 17.8 10E3/UL (ref 1.6–8.3)
NEUTROPHILS NFR BLD AUTO: 91 %
NRBC # BLD AUTO: 0 10E3/UL
NRBC BLD AUTO-RTO: 0 /100
P AXIS - MUSE: 60 DEGREES
PLATELET # BLD AUTO: 219 10E3/UL (ref 150–450)
POTASSIUM SERPL-SCNC: 3.9 MMOL/L (ref 3.4–5.3)
PR INTERVAL - MUSE: 152 MS
PROT SERPL-MCNC: 6.8 G/DL (ref 6.4–8.3)
QRS DURATION - MUSE: 142 MS
QT - MUSE: 398 MS
QTC - MUSE: 481 MS
R AXIS - MUSE: -25 DEGREES
RBC # BLD AUTO: 4.25 10E6/UL (ref 3.8–5.2)
SODIUM SERPL-SCNC: 135 MMOL/L (ref 135–145)
SYSTOLIC BLOOD PRESSURE - MUSE: NORMAL MMHG
T AXIS - MUSE: 20 DEGREES
TROPONIN T SERPL HS-MCNC: 12 NG/L
TROPONIN T SERPL HS-MCNC: 13 NG/L
VENTRICULAR RATE- MUSE: 88 BPM
WBC # BLD AUTO: 19.5 10E3/UL (ref 4–11)

## 2025-06-02 PROCEDURE — 36415 COLL VENOUS BLD VENIPUNCTURE: CPT | Performed by: STUDENT IN AN ORGANIZED HEALTH CARE EDUCATION/TRAINING PROGRAM

## 2025-06-02 PROCEDURE — 99284 EMERGENCY DEPT VISIT MOD MDM: CPT | Performed by: STUDENT IN AN ORGANIZED HEALTH CARE EDUCATION/TRAINING PROGRAM

## 2025-06-02 PROCEDURE — 85379 FIBRIN DEGRADATION QUANT: CPT | Performed by: STUDENT IN AN ORGANIZED HEALTH CARE EDUCATION/TRAINING PROGRAM

## 2025-06-02 PROCEDURE — 99285 EMERGENCY DEPT VISIT HI MDM: CPT | Mod: 25 | Performed by: STUDENT IN AN ORGANIZED HEALTH CARE EDUCATION/TRAINING PROGRAM

## 2025-06-02 PROCEDURE — 71046 X-RAY EXAM CHEST 2 VIEWS: CPT

## 2025-06-02 PROCEDURE — 96365 THER/PROPH/DIAG IV INF INIT: CPT | Performed by: STUDENT IN AN ORGANIZED HEALTH CARE EDUCATION/TRAINING PROGRAM

## 2025-06-02 PROCEDURE — 250N000011 HC RX IP 250 OP 636: Performed by: EMERGENCY MEDICINE

## 2025-06-02 PROCEDURE — 93010 ELECTROCARDIOGRAM REPORT: CPT | Performed by: STUDENT IN AN ORGANIZED HEALTH CARE EDUCATION/TRAINING PROGRAM

## 2025-06-02 PROCEDURE — 82435 ASSAY OF BLOOD CHLORIDE: CPT | Performed by: STUDENT IN AN ORGANIZED HEALTH CARE EDUCATION/TRAINING PROGRAM

## 2025-06-02 PROCEDURE — 85018 HEMOGLOBIN: CPT | Performed by: STUDENT IN AN ORGANIZED HEALTH CARE EDUCATION/TRAINING PROGRAM

## 2025-06-02 PROCEDURE — 83735 ASSAY OF MAGNESIUM: CPT | Performed by: STUDENT IN AN ORGANIZED HEALTH CARE EDUCATION/TRAINING PROGRAM

## 2025-06-02 PROCEDURE — 96367 TX/PROPH/DG ADDL SEQ IV INF: CPT | Performed by: STUDENT IN AN ORGANIZED HEALTH CARE EDUCATION/TRAINING PROGRAM

## 2025-06-02 PROCEDURE — 84484 ASSAY OF TROPONIN QUANT: CPT | Performed by: STUDENT IN AN ORGANIZED HEALTH CARE EDUCATION/TRAINING PROGRAM

## 2025-06-02 PROCEDURE — 93005 ELECTROCARDIOGRAM TRACING: CPT | Performed by: STUDENT IN AN ORGANIZED HEALTH CARE EDUCATION/TRAINING PROGRAM

## 2025-06-02 RX ORDER — AZITHROMYCIN MONOHYDRATE 500 MG/5ML
500 INJECTION, POWDER, LYOPHILIZED, FOR SOLUTION INTRAVENOUS ONCE
Status: COMPLETED | OUTPATIENT
Start: 2025-06-02 | End: 2025-06-02

## 2025-06-02 RX ORDER — CEFTRIAXONE 1 G/1
1 INJECTION, POWDER, FOR SOLUTION INTRAMUSCULAR; INTRAVENOUS ONCE
Status: COMPLETED | OUTPATIENT
Start: 2025-06-02 | End: 2025-06-02

## 2025-06-02 RX ORDER — AZITHROMYCIN 250 MG/1
250 TABLET, FILM COATED ORAL DAILY
Qty: 4 TABLET | Refills: 0 | Status: ON HOLD | OUTPATIENT
Start: 2025-06-02 | End: 2025-06-06

## 2025-06-02 RX ADMIN — CEFTRIAXONE SODIUM 1 G: 1 INJECTION, POWDER, FOR SOLUTION INTRAMUSCULAR; INTRAVENOUS at 08:19

## 2025-06-02 RX ADMIN — AZITHROMYCIN MONOHYDRATE 500 MG: 500 INJECTION, POWDER, LYOPHILIZED, FOR SOLUTION INTRAVENOUS at 08:58

## 2025-06-02 ASSESSMENT — ENCOUNTER SYMPTOMS
HEMATURIA: 0
CHILLS: 0
COUGH: 0
VOMITING: 0
HEADACHES: 0
ARTHRALGIAS: 0
MYALGIAS: 0
APPETITE CHANGE: 0
RHINORRHEA: 0
EYE REDNESS: 0
FEVER: 0
SHORTNESS OF BREATH: 0
NECK STIFFNESS: 0
SORE THROAT: 0
DYSURIA: 0
ACTIVITY CHANGE: 0
DIARRHEA: 0
ABDOMINAL PAIN: 0
FATIGUE: 0
DIZZINESS: 0
NAUSEA: 0

## 2025-06-02 ASSESSMENT — ACTIVITIES OF DAILY LIVING (ADL)
ADLS_ACUITY_SCORE: 41

## 2025-06-02 ASSESSMENT — COLUMBIA-SUICIDE SEVERITY RATING SCALE - C-SSRS
1. IN THE PAST MONTH, HAVE YOU WISHED YOU WERE DEAD OR WISHED YOU COULD GO TO SLEEP AND NOT WAKE UP?: NO
6. HAVE YOU EVER DONE ANYTHING, STARTED TO DO ANYTHING, OR PREPARED TO DO ANYTHING TO END YOUR LIFE?: NO
2. HAVE YOU ACTUALLY HAD ANY THOUGHTS OF KILLING YOURSELF IN THE PAST MONTH?: NO

## 2025-06-02 NOTE — ED PROVIDER NOTES
History     Chief Complaint   Patient presents with    Chest Pain     HPI  Velma Rizvi is a 59 year old female who resenting with chest pain since 10:00 yesterday.  She notes it is centralized and radiates to her back.  Feeling mildly uncomfortable.  She has not any nausea vomiting or diaphoresis with this.  She does have a history of COPD but does not feel short of breath or any any chest tightness or cough.  She has not abdominal pain dizziness or confusion.  She is otherwise doing well and has no other acute complaint aside from the pain that just has not improved and wants is in for evaluated further.    Allergies:  No Known Allergies    Problem List:    Patient Active Problem List    Diagnosis Date Noted    Personal history of DVT (deep vein thrombosis) 2024     Priority: Medium    COPD, severe (H) 2019     Priority: Medium    Peripheral edema 2018     Priority: Medium    Obesity, Class I, BMI 30-34.9 2017     Priority: Medium    Varicose veins of legs 2011     Priority: Medium     See abnormal US study.          Past Medical History:    Past Medical History:   Diagnosis Date    COPD 2019    Phlebitis and thrombophlebitis 10/12/2012    PONV (postoperative nausea and vomiting)        Past Surgical History:    Past Surgical History:   Procedure Laterality Date    COLONOSCOPY N/A 2018    Procedure: COLONOSCOPY;  COLONOSCOPY;  Surgeon: Paresh Curran MD;  Location:  GI    HC TREATMENT INCOMPLETE  SURG, ANY TRIMESTER      D & C  s/p miscarriage    LAPAROSCOPIC CHOLECYSTECTOMY N/A 2019    Procedure: LAPAROSCOPIC CHOLECYSTECTOMY;  Surgeon: Rob Gonzales DO;  Location:  OR    ZZC NONSPECIFIC PROCEDURE      Right hand surgery- tendon repair       Family History:    Family History   Problem Relation Age of Onset    Cancer Mother          - lung cancer with mets.    Respiratory Father         asthma    Lung Cancer Brother          Smoker    Hypertension Brother     Diabetes Brother     Cancer Maternal Grandmother         breast- 48 fatal age 51    Hypertension Son     Diabetes Maternal Uncle        Social History:  Marital Status:   [2]  Social History     Tobacco Use    Smoking status: Former     Current packs/day: 0.00     Average packs/day: 1 pack/day for 37.8 years (37.8 ttl pk-yrs)     Types: Cigarettes     Start date: 1985     Quit date: 2023     Years since quittin.2     Passive exposure: Never    Smokeless tobacco: Never    Tobacco comments:     already quit   Vaping Use    Vaping status: Never Used   Substance Use Topics    Alcohol use: Not Currently     Comment: rarely    Drug use: No        Medications:    albuterol (VENTOLIN HFA) 108 (90 Base) MCG/ACT inhaler  buPROPion (WELLBUTRIN SR) 100 MG 12 hr tablet  Cholecalciferol (VITAMIN D3) 75 MCG (3000 UT) TABS  COMPRESSION STOCKINGS  estradiol (VAGIFEM) 10 MCG TABS vaginal tablet  Fluticasone-Umeclidin-Vilant (TRELEGY ELLIPTA) 100-62.5-25 MCG/ACT oral inhaler  ibuprofen (ADVIL/MOTRIN) 200 MG tablet  naltrexone (DEPADE/REVIA) 50 MG tablet  omeprazole (PRILOSEC) 40 MG DR capsule  phentermine (ADIPEX-P) 37.5 MG capsule          Review of Systems   Constitutional:  Negative for activity change, appetite change, chills, fatigue and fever.   HENT:  Negative for congestion, rhinorrhea and sore throat.    Eyes:  Negative for redness.   Respiratory:  Negative for cough and shortness of breath.    Cardiovascular:  Positive for chest pain.   Gastrointestinal:  Negative for abdominal pain, diarrhea, nausea and vomiting.   Genitourinary:  Negative for dysuria and hematuria.   Musculoskeletal:  Negative for arthralgias, myalgias and neck stiffness.   Skin:  Negative for rash.   Neurological:  Negative for dizziness and headaches.   All other systems reviewed and are negative.      Physical Exam   BP: 117/65  Pulse: 84  Temp: 98.5  F (36.9  C)  Resp: 16  Height: 167.6 cm (5'  "6\")  Weight: 84.8 kg (187 lb)  SpO2: 98 %      Physical Exam  Vitals and nursing note reviewed.   Constitutional:       General: She is not in acute distress.     Appearance: Normal appearance. She is well-developed and normal weight. She is not diaphoretic.   HENT:      Head: Atraumatic.      Mouth/Throat:      Mouth: Mucous membranes are moist.   Eyes:      General: No scleral icterus.     Conjunctiva/sclera: Conjunctivae normal.   Cardiovascular:      Rate and Rhythm: Normal rate.      Heart sounds: Normal heart sounds. No murmur heard.  Pulmonary:      Effort: No respiratory distress.      Breath sounds: Normal breath sounds. No wheezing or rales.   Abdominal:      General: Abdomen is flat.   Musculoskeletal:      Cervical back: Neck supple.   Skin:     General: Skin is warm.      Findings: No rash.   Neurological:      General: No focal deficit present.      Mental Status: She is alert and oriented to person, place, and time.   Psychiatric:         Mood and Affect: Mood normal.         Behavior: Behavior normal.         ED Course        Procedures                Results for orders placed or performed during the hospital encounter of 06/02/25 (from the past 24 hours)   EKG 12 lead   Result Value Ref Range    Systolic Blood Pressure  mmHg    Diastolic Blood Pressure  mmHg    Ventricular Rate 88 BPM    Atrial Rate 88 BPM    OR Interval 152 ms    QRS Duration 142 ms     ms    QTc 481 ms    P Axis 60 degrees    R AXIS -25 degrees    T Axis 20 degrees    Interpretation ECG       Sinus rhythm  Right bundle branch block  Abnormal ECG  No previous ECGs available     CBC with platelets differential    Narrative    The following orders were created for panel order CBC with platelets differential.  Procedure                               Abnormality         Status                     ---------                               -----------         ------                     CBC with platelets and ...[[id number]]  Abnormal "            Final result                 Please view results for these tests on the individual orders.   D dimer quantitative   Result Value Ref Range    D-Dimer Quantitative 0.28 0.00 - 0.50 ug/mL FEU    Narrative    This D-dimer assay is intended for use in conjunction with a clinical pretest probability assessment model to exclude pulmonary embolism (PE) and deep venous thrombosis (DVT) in outpatients suspected of PE or DVT. The cut-off value is 0.50 ug/mL FEU.    For patients 50 years of age or older, the application of age-adjusted cut-off values for D-Dimer may increase the specificity without significant effect on sensitivity. The literature suggested calculation age adjusted cut-off in ug/L = age in years x 10 ug/L. The results in this laboratory are reported as ug/mL rather than ug/L. The calculation for age adjusted cut off in ug/mL= age in years x 0.01 ug/mL. For example, the cut off for a 76 year old male is 76 x 0.01 ug/mL = 0.76 ug/mL (760 ug/L).    M Ely et al. Age adjusted D-dimer cut-off levels to rule out pulmonary embolism: The ADJUST-PE Study. TOMMY 2014;311:9560-5796.; HJ Nilo et al. Diagnostic accuracy of conventional or age adjusted D-dimer cutoff values in older patients with suspected venous thromboembolism. Systemic review and meta-analysis. BMJ 2013:346:f2492.   Comprehensive metabolic panel   Result Value Ref Range    Sodium 135 135 - 145 mmol/L    Potassium 3.9 3.4 - 5.3 mmol/L    Carbon Dioxide (CO2) 23 22 - 29 mmol/L    Anion Gap 11 7 - 15 mmol/L    Urea Nitrogen 9.6 6.0 - 20.0 mg/dL    Creatinine 0.55 0.51 - 0.95 mg/dL    GFR Estimate >90 >60 mL/min/1.73m2    Calcium 9.1 8.8 - 10.4 mg/dL    Chloride 101 98 - 107 mmol/L    Glucose 158 (H) 70 - 99 mg/dL    Alkaline Phosphatase 70 40 - 150 U/L    AST 29 0 - 45 U/L    ALT 36 0 - 50 U/L    Protein Total 6.8 6.4 - 8.3 g/dL    Albumin 3.9 3.5 - 5.2 g/dL    Bilirubin Total 0.8 <=1.2 mg/dL   Troponin T, High Sensitivity   Result Value  Ref Range    Troponin T, High Sensitivity 13 <=14 ng/L   Magnesium   Result Value Ref Range    Magnesium 1.9 1.7 - 2.3 mg/dL   CBC with platelets and differential   Result Value Ref Range    WBC Count 19.5 (H) 4.0 - 11.0 10e3/uL    RBC Count 4.25 3.80 - 5.20 10e6/uL    Hemoglobin 13.3 11.7 - 15.7 g/dL    Hematocrit 38.7 35.0 - 47.0 %    MCV 91 78 - 100 fL    MCH 31.3 26.5 - 33.0 pg    MCHC 34.4 31.5 - 36.5 g/dL    RDW 13.1 10.0 - 15.0 %    Platelet Count 219 150 - 450 10e3/uL    % Neutrophils 91 %    % Lymphocytes 3 %    % Monocytes 5 %    % Eosinophils 0 %    % Basophils 0 %    % Immature Granulocytes 1 %    NRBCs per 100 WBC 0 <1 /100    Absolute Neutrophils 17.8 (H) 1.6 - 8.3 10e3/uL    Absolute Lymphocytes 0.5 (L) 0.8 - 5.3 10e3/uL    Absolute Monocytes 1.0 0.0 - 1.3 10e3/uL    Absolute Eosinophils 0.0 0.0 - 0.7 10e3/uL    Absolute Basophils 0.1 0.0 - 0.2 10e3/uL    Absolute Immature Granulocytes 0.2 <=0.4 10e3/uL    Absolute NRBCs 0.0 10e3/uL       Medications - No data to display    Assessments & Plan (with Medical Decision Making)     I have reviewed the nursing notes.    I have reviewed the findings, diagnosis, plan and need for follow up with the patient.      Medical Decision Making  Velma Rizvi is a 59 year old female who resenting with chest pain since 10:00 yesterday.  She notes it is centralized and radiates to her back.  Feeling mildly uncomfortable.  She has not any nausea vomiting or diaphoresis with this.  She does have a history of COPD but does not feel short of breath or any any chest tightness or cough.  She has not abdominal pain dizziness or confusion.  She is otherwise doing well and has no other acute complaint aside from the pain that just has not improved and wants is in for evaluated further.    Vitals are reassuring with a blood pressure of 117/65 a temperature of 98.5 and a pulse of 84 with an oxygen saturation of 98% room air.    Patient's initial troponin is at 13 and EKG is  mildly abnormal for previous however previous was various years ago.  She is got some inverted T waves in V3 and V2 and V1 as well as a wide QRS which is difficult to assess as V4 and V6 have ST segment depressions or not however do not appreciate any significant ST segment elevations.  Her D-dimer was reassuring at 0.28 CMP was unremarkable with no electrolyte disturbances renal impairment or hepatobiliary changes.  She was however found to have a leukocytosis of 19.5.  Therefore plain film imaging ordered to evaluate for any signs of pneumonia.    Case was discussed with oncoming physician Dr. Marques will follow-up on repeat troponin as well as chest x-ray for further management plan.    New Prescriptions    No medications on file       Final diagnoses:   None       6/2/2025   [facility] EMERGENCY DEPT       Trey Summers MD  06/02/25 0015

## 2025-06-02 NOTE — ED PROVIDER NOTES
Assumed care at shift change from Dr. Elli Summers.  See his note for patient presenting details and initial workup.  Briefly, this is a 59-year-old female presenting with chest pain that started yesterday afternoon.  Has been complaining of shortness of breath as well.  Workup showed a normal troponin at 13, but there were some nonspecific T wave changes that were concerning.  A second troponin is pending.  D-dimer had been collected and was normal, so chest x-ray has been ordered.  This result is pending.  Patient is also noted to have an elevated white blood cell count of 19.5 with left shift.  Was afebrile on arrival.  Will await x-ray results and repeat troponin before determining ultimate disposition    Results for orders placed or performed during the hospital encounter of 06/02/25   XR Chest 2 Views     Status: None    Narrative    EXAM: XR CHEST 2 VIEWS  LOCATION: [facility]  DATE: 6/2/2025    INDICATION: chest pain  COMPARISON: CT chest 2/11/2025.      Impression    IMPRESSION: Cardiac silhouette is within normal limits. Extensive patchy airspace opacities of both lungs, greatest in the left upper lobe, suspicious for multifocal pneumonia. No pleural effusion or discernible pneumothorax.   D dimer quantitative     Status: Normal   Result Value Ref Range    D-Dimer Quantitative 0.28 0.00 - 0.50 ug/mL FEU    Narrative    This D-dimer assay is intended for use in conjunction with a clinical pretest probability assessment model to exclude pulmonary embolism (PE) and deep venous thrombosis (DVT) in outpatients suspected of PE or DVT. The cut-off value is 0.50 ug/mL FEU.    For patients 50 years of age or older, the application of age-adjusted cut-off values for D-Dimer may increase the specificity without significant effect on sensitivity. The literature suggested calculation age adjusted cut-off in ug/L = age in years x 10 ug/L. The results in this laboratory are reported  as ug/mL rather than ug/L. The calculation for age adjusted cut off in ug/mL= age in years x 0.01 ug/mL. For example, the cut off for a 76 year old male is 76 x 0.01 ug/mL = 0.76 ug/mL (760 ug/L).    M Ely et al. Age adjusted D-dimer cut-off levels to rule out pulmonary embolism: The ADJUST-PE Study. TOMMY 2014;311:5519-3562.; HJ Nilo et al. Diagnostic accuracy of conventional or age adjusted D-dimer cutoff values in older patients with suspected venous thromboembolism. Systemic review and meta-analysis. BMJ 2013:346:f2492.   Comprehensive metabolic panel     Status: Abnormal   Result Value Ref Range    Sodium 135 135 - 145 mmol/L    Potassium 3.9 3.4 - 5.3 mmol/L    Carbon Dioxide (CO2) 23 22 - 29 mmol/L    Anion Gap 11 7 - 15 mmol/L    Urea Nitrogen 9.6 6.0 - 20.0 mg/dL    Creatinine 0.55 0.51 - 0.95 mg/dL    GFR Estimate >90 >60 mL/min/1.73m2    Calcium 9.1 8.8 - 10.4 mg/dL    Chloride 101 98 - 107 mmol/L    Glucose 158 (H) 70 - 99 mg/dL    Alkaline Phosphatase 70 40 - 150 U/L    AST 29 0 - 45 U/L    ALT 36 0 - 50 U/L    Protein Total 6.8 6.4 - 8.3 g/dL    Albumin 3.9 3.5 - 5.2 g/dL    Bilirubin Total 0.8 <=1.2 mg/dL   Troponin T, High Sensitivity     Status: Normal   Result Value Ref Range    Troponin T, High Sensitivity 13 <=14 ng/L   Magnesium     Status: Normal   Result Value Ref Range    Magnesium 1.9 1.7 - 2.3 mg/dL   CBC with platelets and differential     Status: Abnormal   Result Value Ref Range    WBC Count 19.5 (H) 4.0 - 11.0 10e3/uL    RBC Count 4.25 3.80 - 5.20 10e6/uL    Hemoglobin 13.3 11.7 - 15.7 g/dL    Hematocrit 38.7 35.0 - 47.0 %    MCV 91 78 - 100 fL    MCH 31.3 26.5 - 33.0 pg    MCHC 34.4 31.5 - 36.5 g/dL    RDW 13.1 10.0 - 15.0 %    Platelet Count 219 150 - 450 10e3/uL    % Neutrophils 91 %    % Lymphocytes 3 %    % Monocytes 5 %    % Eosinophils 0 %    % Basophils 0 %    % Immature Granulocytes 1 %    NRBCs per 100 WBC 0 <1 /100    Absolute Neutrophils 17.8 (H) 1.6 - 8.3 10e3/uL     Absolute Lymphocytes 0.5 (L) 0.8 - 5.3 10e3/uL    Absolute Monocytes 1.0 0.0 - 1.3 10e3/uL    Absolute Eosinophils 0.0 0.0 - 0.7 10e3/uL    Absolute Basophils 0.1 0.0 - 0.2 10e3/uL    Absolute Immature Granulocytes 0.2 <=0.4 10e3/uL    Absolute NRBCs 0.0 10e3/uL   Troponin T, High Sensitivity     Status: Normal   Result Value Ref Range    Troponin T, High Sensitivity 12 <=14 ng/L   EKG 12 lead     Status: None (Preliminary result)   Result Value Ref Range    Systolic Blood Pressure  mmHg    Diastolic Blood Pressure  mmHg    Ventricular Rate 88 BPM    Atrial Rate 88 BPM    SC Interval 152 ms    QRS Duration 142 ms     ms    QTc 481 ms    P Axis 60 degrees    R AXIS -25 degrees    T Axis 20 degrees    Interpretation ECG       Sinus rhythm  Right bundle branch block  Abnormal ECG  No previous ECGs available     CBC with platelets differential     Status: Abnormal    Narrative    The following orders were created for panel order CBC with platelets differential.  Procedure                               Abnormality         Status                     ---------                               -----------         ------                     CBC with platelets and ...[[id number]]  Abnormal            Final result                 Please view results for these tests on the individual orders.      Lab results as above.  Repeat troponin was normal.  Chest x-ray was reviewed, shows multifocal pneumonia, worse in left lung.  Ordered for IV Rocephin and azithromycin to cover for community-acquired pneumonia.  Updated patient on these results.  She is still oxygenating well on room air, keeping sats above 90%, and does not appear to have labored breathing.  She is not diaphoretic.  Will plan to have her on course of Augmentin and azithromycin to complete a total of 10 days and 5 days of antibiotic therapy respectively.  ED return precautions were discussed.  Otherwise she can follow-up with her primary doctor in clinic.  Left the  department in stable condition     Karolina Marques,   06/02/25 0906

## 2025-06-02 NOTE — DISCHARGE INSTRUCTIONS
Your workup today was reassuring that you do not have a blood clot, and is reassuring that your chest pain is not due to strain or stress on your heart from a heart attack.  Your chest x-ray showed pneumonia in both of your lungs, worse on the left side.  You were given 2 doses of antibiotics through the IV to help cover this    You will need to continue the antibiotics at home.  You can take your next doses starting tomorrow morning.  Will need to take the Augmentin twice daily for 9 more days.  The azithromycin will be taken once daily for 4 more days    Make sure that you are drinking fluids and get rest.  May take Tylenol or ibuprofen per bottle instructions as needed for mild or moderate pain or fever    Once you have been on antibiotics for 24 hours and as long as you are not running a fever, it is okay for you to return to work    Follow-up with your primary doctor within 2-4 weeks as needed    If you develop any significant new or worsening symptoms do not hesitate to return to the emergency room for evaluation

## 2025-06-02 NOTE — ED TRIAGE NOTES
Pt presents to ED with c/o of Left sided Chest pain since yesterday afternoon along with SOB. Hx: COPD and emphysema     Triage Assessment (Adult)       Row Name 06/02/25 0538          Triage Assessment    Airway WDL WDL        Respiratory WDL    Respiratory WDL X;rhythm/pattern     Rhythm/Pattern, Respiratory shortness of breath        Skin Circulation/Temperature WDL    Skin Circulation/Temperature WDL WDL        Cardiac WDL    Cardiac WDL X;chest pain        Chest Pain Assessment    Chest Pain Location other (see comments)  left side     Character stabbing     Duration yesterday evening        Peripheral/Neurovascular WDL    Peripheral Neurovascular WDL WDL        Cognitive/Neuro/Behavioral WDL    Cognitive/Neuro/Behavioral WDL WDL

## 2025-06-03 ENCOUNTER — HOSPITAL ENCOUNTER (INPATIENT)
Facility: CLINIC | Age: 60
End: 2025-06-03
Attending: NURSE PRACTITIONER | Admitting: INTERNAL MEDICINE
Payer: COMMERCIAL

## 2025-06-03 ENCOUNTER — APPOINTMENT (OUTPATIENT)
Dept: CT IMAGING | Facility: CLINIC | Age: 60
End: 2025-06-03
Attending: NURSE PRACTITIONER
Payer: COMMERCIAL

## 2025-06-03 DIAGNOSIS — J96.01 ACUTE RESPIRATORY FAILURE WITH HYPOXIA (H): ICD-10-CM

## 2025-06-03 DIAGNOSIS — J44.9 COPD, SEVERE (H): Primary | ICD-10-CM

## 2025-06-03 DIAGNOSIS — J18.9 MULTIFOCAL PNEUMONIA: ICD-10-CM

## 2025-06-03 LAB
ALBUMIN SERPL BCG-MCNC: 3.8 G/DL (ref 3.5–5.2)
ALP SERPL-CCNC: 88 U/L (ref 40–150)
ALT SERPL W P-5'-P-CCNC: 33 U/L (ref 0–50)
ANION GAP SERPL CALCULATED.3IONS-SCNC: 14 MMOL/L (ref 7–15)
AST SERPL W P-5'-P-CCNC: 21 U/L (ref 0–45)
ATRIAL RATE - MUSE: 91 BPM
BASE EXCESS BLDV CALC-SCNC: -1 MMOL/L (ref -3–3)
BASOPHILS # BLD AUTO: 0.1 10E3/UL (ref 0–0.2)
BASOPHILS NFR BLD AUTO: 0 %
BILIRUB SERPL-MCNC: 0.3 MG/DL
BUN SERPL-MCNC: 9.2 MG/DL (ref 6–20)
CALCIUM SERPL-MCNC: 9.4 MG/DL (ref 8.8–10.4)
CHLORIDE SERPL-SCNC: 103 MMOL/L (ref 98–107)
CREAT SERPL-MCNC: 0.57 MG/DL (ref 0.51–0.95)
DIASTOLIC BLOOD PRESSURE - MUSE: NORMAL MMHG
EGFRCR SERPLBLD CKD-EPI 2021: >90 ML/MIN/1.73M2
EOSINOPHIL # BLD AUTO: 0.2 10E3/UL (ref 0–0.7)
EOSINOPHIL NFR BLD AUTO: 2 %
ERYTHROCYTE [DISTWIDTH] IN BLOOD BY AUTOMATED COUNT: 13.1 % (ref 10–15)
GLUCOSE SERPL-MCNC: 144 MG/DL (ref 70–99)
HCO3 BLDV-SCNC: 24 MMOL/L (ref 21–28)
HCO3 SERPL-SCNC: 21 MMOL/L (ref 22–29)
HCT VFR BLD AUTO: 37.1 % (ref 35–47)
HGB BLD-MCNC: 12.6 G/DL (ref 11.7–15.7)
IMM GRANULOCYTES # BLD: 0.1 10E3/UL
IMM GRANULOCYTES NFR BLD: 1 %
INTERPRETATION ECG - MUSE: NORMAL
LACTATE SERPL-SCNC: 1.8 MMOL/L (ref 0.7–2)
LACTATE SERPL-SCNC: 2.1 MMOL/L (ref 0.7–2)
LYMPHOCYTES # BLD AUTO: 1.5 10E3/UL (ref 0.8–5.3)
LYMPHOCYTES NFR BLD AUTO: 11 %
MCH RBC QN AUTO: 30.6 PG (ref 26.5–33)
MCHC RBC AUTO-ENTMCNC: 34 G/DL (ref 31.5–36.5)
MCV RBC AUTO: 90 FL (ref 78–100)
MONOCYTES # BLD AUTO: 1.1 10E3/UL (ref 0–1.3)
MONOCYTES NFR BLD AUTO: 8 %
NEUTROPHILS # BLD AUTO: 10.8 10E3/UL (ref 1.6–8.3)
NEUTROPHILS NFR BLD AUTO: 79 %
NRBC # BLD AUTO: 0 10E3/UL
NRBC BLD AUTO-RTO: 0 /100
NT-PROBNP SERPL-MCNC: 342 PG/ML (ref 0–226)
O2/TOTAL GAS SETTING VFR VENT: 0 %
OXYHGB MFR BLDV: 46 % (ref 70–75)
P AXIS - MUSE: 71 DEGREES
PCO2 BLDV: 42 MM HG (ref 40–50)
PH BLDV: 7.37 [PH] (ref 7.32–7.43)
PLATELET # BLD AUTO: 254 10E3/UL (ref 150–450)
PO2 BLDV: 25 MM HG (ref 25–47)
POTASSIUM SERPL-SCNC: 3.6 MMOL/L (ref 3.4–5.3)
PR INTERVAL - MUSE: 144 MS
PROT SERPL-MCNC: 7.2 G/DL (ref 6.4–8.3)
QRS DURATION - MUSE: 142 MS
QT - MUSE: 384 MS
QTC - MUSE: 472 MS
R AXIS - MUSE: 94 DEGREES
RBC # BLD AUTO: 4.12 10E6/UL (ref 3.8–5.2)
SAO2 % BLDV: 46.4 % (ref 70–75)
SODIUM SERPL-SCNC: 138 MMOL/L (ref 135–145)
SYSTOLIC BLOOD PRESSURE - MUSE: NORMAL MMHG
T AXIS - MUSE: 66 DEGREES
TROPONIN T SERPL HS-MCNC: 12 NG/L
VENTRICULAR RATE- MUSE: 91 BPM
WBC # BLD AUTO: 13.7 10E3/UL (ref 4–11)

## 2025-06-03 PROCEDURE — 82805 BLOOD GASES W/O2 SATURATION: CPT | Performed by: NURSE PRACTITIONER

## 2025-06-03 PROCEDURE — 250N000009 HC RX 250: Performed by: NURSE PRACTITIONER

## 2025-06-03 PROCEDURE — 120N000001 HC R&B MED SURG/OB

## 2025-06-03 PROCEDURE — 99285 EMERGENCY DEPT VISIT HI MDM: CPT | Mod: 25 | Performed by: NURSE PRACTITIONER

## 2025-06-03 PROCEDURE — 96376 TX/PRO/DX INJ SAME DRUG ADON: CPT | Performed by: NURSE PRACTITIONER

## 2025-06-03 PROCEDURE — 83605 ASSAY OF LACTIC ACID: CPT | Performed by: NURSE PRACTITIONER

## 2025-06-03 PROCEDURE — 96365 THER/PROPH/DIAG IV INF INIT: CPT | Mod: 59 | Performed by: NURSE PRACTITIONER

## 2025-06-03 PROCEDURE — 80053 COMPREHEN METABOLIC PANEL: CPT | Performed by: NURSE PRACTITIONER

## 2025-06-03 PROCEDURE — 250N000011 HC RX IP 250 OP 636: Performed by: NURSE PRACTITIONER

## 2025-06-03 PROCEDURE — 36415 COLL VENOUS BLD VENIPUNCTURE: CPT | Performed by: NURSE PRACTITIONER

## 2025-06-03 PROCEDURE — 96375 TX/PRO/DX INJ NEW DRUG ADDON: CPT | Performed by: NURSE PRACTITIONER

## 2025-06-03 PROCEDURE — 258N000003 HC RX IP 258 OP 636: Performed by: NURSE PRACTITIONER

## 2025-06-03 PROCEDURE — 83880 ASSAY OF NATRIURETIC PEPTIDE: CPT | Performed by: NURSE PRACTITIONER

## 2025-06-03 PROCEDURE — 93010 ELECTROCARDIOGRAM REPORT: CPT | Performed by: NURSE PRACTITIONER

## 2025-06-03 PROCEDURE — 84484 ASSAY OF TROPONIN QUANT: CPT | Performed by: NURSE PRACTITIONER

## 2025-06-03 PROCEDURE — 86140 C-REACTIVE PROTEIN: CPT | Performed by: NURSE PRACTITIONER

## 2025-06-03 PROCEDURE — 93005 ELECTROCARDIOGRAM TRACING: CPT | Performed by: NURSE PRACTITIONER

## 2025-06-03 PROCEDURE — 85025 COMPLETE CBC W/AUTO DIFF WBC: CPT | Performed by: NURSE PRACTITIONER

## 2025-06-03 PROCEDURE — 99285 EMERGENCY DEPT VISIT HI MDM: CPT | Performed by: NURSE PRACTITIONER

## 2025-06-03 PROCEDURE — 71275 CT ANGIOGRAPHY CHEST: CPT

## 2025-06-03 RX ORDER — IOPAMIDOL 755 MG/ML
500 INJECTION, SOLUTION INTRAVASCULAR ONCE
Status: COMPLETED | OUTPATIENT
Start: 2025-06-03 | End: 2025-06-03

## 2025-06-03 RX ORDER — HYDROMORPHONE HYDROCHLORIDE 1 MG/ML
0.5 INJECTION, SOLUTION INTRAMUSCULAR; INTRAVENOUS; SUBCUTANEOUS ONCE
Refills: 0 | Status: COMPLETED | OUTPATIENT
Start: 2025-06-03 | End: 2025-06-03

## 2025-06-03 RX ORDER — ONDANSETRON 2 MG/ML
4 INJECTION INTRAMUSCULAR; INTRAVENOUS ONCE
Status: COMPLETED | OUTPATIENT
Start: 2025-06-03 | End: 2025-06-03

## 2025-06-03 RX ORDER — IPRATROPIUM BROMIDE AND ALBUTEROL SULFATE 2.5; .5 MG/3ML; MG/3ML
3 SOLUTION RESPIRATORY (INHALATION) EVERY 4 HOURS PRN
Status: ACTIVE | OUTPATIENT
Start: 2025-06-03

## 2025-06-03 RX ORDER — KETOROLAC TROMETHAMINE 15 MG/ML
15 INJECTION, SOLUTION INTRAMUSCULAR; INTRAVENOUS ONCE
Status: COMPLETED | OUTPATIENT
Start: 2025-06-03 | End: 2025-06-03

## 2025-06-03 RX ORDER — CEFTRIAXONE 2 G/1
2 INJECTION, POWDER, FOR SOLUTION INTRAMUSCULAR; INTRAVENOUS EVERY 24 HOURS
Status: DISPENSED | OUTPATIENT
Start: 2025-06-04 | End: 2025-06-10

## 2025-06-03 RX ORDER — CEFTRIAXONE 2 G/1
2 INJECTION, POWDER, FOR SOLUTION INTRAMUSCULAR; INTRAVENOUS ONCE
Status: COMPLETED | OUTPATIENT
Start: 2025-06-03 | End: 2025-06-03

## 2025-06-03 RX ADMIN — SODIUM CHLORIDE 1000 ML: 0.9 INJECTION, SOLUTION INTRAVENOUS at 22:30

## 2025-06-03 RX ADMIN — HYDROMORPHONE HYDROCHLORIDE 0.5 MG: 1 INJECTION, SOLUTION INTRAMUSCULAR; INTRAVENOUS; SUBCUTANEOUS at 22:49

## 2025-06-03 RX ADMIN — IOPAMIDOL 70 ML: 755 INJECTION, SOLUTION INTRAVENOUS at 22:21

## 2025-06-03 RX ADMIN — KETOROLAC TROMETHAMINE 15 MG: 15 INJECTION, SOLUTION INTRAMUSCULAR; INTRAVENOUS at 21:57

## 2025-06-03 RX ADMIN — ONDANSETRON 4 MG: 2 INJECTION, SOLUTION INTRAMUSCULAR; INTRAVENOUS at 21:56

## 2025-06-03 RX ADMIN — HYDROMORPHONE HYDROCHLORIDE 0.5 MG: 1 INJECTION, SOLUTION INTRAMUSCULAR; INTRAVENOUS; SUBCUTANEOUS at 21:56

## 2025-06-03 RX ADMIN — SODIUM CHLORIDE 70 ML: 9 INJECTION, SOLUTION INTRAVENOUS at 22:21

## 2025-06-03 RX ADMIN — CEFTRIAXONE SODIUM 2 G: 2 INJECTION, POWDER, FOR SOLUTION INTRAMUSCULAR; INTRAVENOUS at 22:48

## 2025-06-03 ASSESSMENT — ACTIVITIES OF DAILY LIVING (ADL)
ADLS_ACUITY_SCORE: 41
ADLS_ACUITY_SCORE: 41

## 2025-06-03 ASSESSMENT — COLUMBIA-SUICIDE SEVERITY RATING SCALE - C-SSRS
6. HAVE YOU EVER DONE ANYTHING, STARTED TO DO ANYTHING, OR PREPARED TO DO ANYTHING TO END YOUR LIFE?: NO
2. HAVE YOU ACTUALLY HAD ANY THOUGHTS OF KILLING YOURSELF IN THE PAST MONTH?: NO
1. IN THE PAST MONTH, HAVE YOU WISHED YOU WERE DEAD OR WISHED YOU COULD GO TO SLEEP AND NOT WAKE UP?: NO

## 2025-06-04 LAB
ANION GAP SERPL CALCULATED.3IONS-SCNC: 10 MMOL/L (ref 7–15)
BUN SERPL-MCNC: 6.8 MG/DL (ref 6–20)
C PNEUM DNA SPEC QL NAA+PROBE: NOT DETECTED
CALCIUM SERPL-MCNC: 8.7 MG/DL (ref 8.8–10.4)
CHLORIDE SERPL-SCNC: 108 MMOL/L (ref 98–107)
CREAT SERPL-MCNC: 0.5 MG/DL (ref 0.51–0.95)
CRP SERPL-MCNC: 250.6 MG/L
EGFRCR SERPLBLD CKD-EPI 2021: >90 ML/MIN/1.73M2
ERYTHROCYTE [DISTWIDTH] IN BLOOD BY AUTOMATED COUNT: 13.2 % (ref 10–15)
FLUAV H1 2009 PAND RNA SPEC QL NAA+PROBE: NOT DETECTED
FLUAV H1 RNA SPEC QL NAA+PROBE: NOT DETECTED
FLUAV H3 RNA SPEC QL NAA+PROBE: NOT DETECTED
FLUAV RNA SPEC QL NAA+PROBE: NOT DETECTED
FLUBV RNA SPEC QL NAA+PROBE: NOT DETECTED
GLUCOSE SERPL-MCNC: 116 MG/DL (ref 70–99)
HADV DNA SPEC QL NAA+PROBE: NOT DETECTED
HCO3 SERPL-SCNC: 22 MMOL/L (ref 22–29)
HCOV PNL SPEC NAA+PROBE: NOT DETECTED
HCT VFR BLD AUTO: 33.2 % (ref 35–47)
HGB BLD-MCNC: 11.5 G/DL (ref 11.7–15.7)
HMPV RNA SPEC QL NAA+PROBE: NOT DETECTED
HPIV1 RNA SPEC QL NAA+PROBE: NOT DETECTED
HPIV2 RNA SPEC QL NAA+PROBE: NOT DETECTED
HPIV3 RNA SPEC QL NAA+PROBE: NOT DETECTED
HPIV4 RNA SPEC QL NAA+PROBE: NOT DETECTED
L PNEUMO1 AG UR QL IA: NEGATIVE
M PNEUMO DNA SPEC QL NAA+PROBE: NOT DETECTED
MCH RBC QN AUTO: 31.1 PG (ref 26.5–33)
MCHC RBC AUTO-ENTMCNC: 34.6 G/DL (ref 31.5–36.5)
MCV RBC AUTO: 90 FL (ref 78–100)
PLATELET # BLD AUTO: 228 10E3/UL (ref 150–450)
POTASSIUM SERPL-SCNC: 3.7 MMOL/L (ref 3.4–5.3)
PROCALCITONIN SERPL IA-MCNC: 0.97 NG/ML
RBC # BLD AUTO: 3.7 10E6/UL (ref 3.8–5.2)
RSV RNA SPEC QL NAA+PROBE: NOT DETECTED
RSV RNA SPEC QL NAA+PROBE: NOT DETECTED
RV+EV RNA SPEC QL NAA+PROBE: NOT DETECTED
S PNEUM AG SPEC QL: NEGATIVE
SODIUM SERPL-SCNC: 140 MMOL/L (ref 135–145)
SPECIMEN TYPE: NORMAL
WBC # BLD AUTO: 11 10E3/UL (ref 4–11)

## 2025-06-04 PROCEDURE — 87205 SMEAR GRAM STAIN: CPT | Performed by: INTERNAL MEDICINE

## 2025-06-04 PROCEDURE — 87486 CHLMYD PNEUM DNA AMP PROBE: CPT | Performed by: NURSE PRACTITIONER

## 2025-06-04 PROCEDURE — 86140 C-REACTIVE PROTEIN: CPT | Performed by: NURSE PRACTITIONER

## 2025-06-04 PROCEDURE — 87633 RESP VIRUS 12-25 TARGETS: CPT | Performed by: NURSE PRACTITIONER

## 2025-06-04 PROCEDURE — 85027 COMPLETE CBC AUTOMATED: CPT | Performed by: INTERNAL MEDICINE

## 2025-06-04 PROCEDURE — 99207 PR NO BILLABLE SERVICE THIS VISIT: CPT | Performed by: NURSE PRACTITIONER

## 2025-06-04 PROCEDURE — 80048 BASIC METABOLIC PNL TOTAL CA: CPT | Performed by: INTERNAL MEDICINE

## 2025-06-04 PROCEDURE — 82435 ASSAY OF BLOOD CHLORIDE: CPT | Performed by: INTERNAL MEDICINE

## 2025-06-04 PROCEDURE — 99222 1ST HOSP IP/OBS MODERATE 55: CPT | Mod: 95 | Performed by: INTERNAL MEDICINE

## 2025-06-04 PROCEDURE — 36415 COLL VENOUS BLD VENIPUNCTURE: CPT | Performed by: INTERNAL MEDICINE

## 2025-06-04 PROCEDURE — 120N000001 HC R&B MED SURG/OB

## 2025-06-04 PROCEDURE — 87070 CULTURE OTHR SPECIMN AEROBIC: CPT | Performed by: INTERNAL MEDICINE

## 2025-06-04 PROCEDURE — 87899 AGENT NOS ASSAY W/OPTIC: CPT | Performed by: INTERNAL MEDICINE

## 2025-06-04 PROCEDURE — 99232 SBSQ HOSP IP/OBS MODERATE 35: CPT | Performed by: NURSE PRACTITIONER

## 2025-06-04 PROCEDURE — 84145 PROCALCITONIN (PCT): CPT | Performed by: NURSE PRACTITIONER

## 2025-06-04 PROCEDURE — 272N000202 HC AEROBIKA WITH MANOMETER

## 2025-06-04 PROCEDURE — 250N000011 HC RX IP 250 OP 636: Performed by: INTERNAL MEDICINE

## 2025-06-04 PROCEDURE — 250N000013 HC RX MED GY IP 250 OP 250 PS 637: Performed by: INTERNAL MEDICINE

## 2025-06-04 PROCEDURE — 258N000003 HC RX IP 258 OP 636: Performed by: INTERNAL MEDICINE

## 2025-06-04 RX ORDER — PANTOPRAZOLE SODIUM 40 MG/1
40 TABLET, DELAYED RELEASE ORAL 2 TIMES DAILY
Status: DISPENSED | OUTPATIENT
Start: 2025-06-04

## 2025-06-04 RX ORDER — AMOXICILLIN 250 MG
2 CAPSULE ORAL 2 TIMES DAILY PRN
Status: ACTIVE | OUTPATIENT
Start: 2025-06-04

## 2025-06-04 RX ORDER — ALBUTEROL SULFATE 90 UG/1
2 INHALANT RESPIRATORY (INHALATION) EVERY 4 HOURS PRN
Status: ACTIVE | OUTPATIENT
Start: 2025-06-04

## 2025-06-04 RX ORDER — ACETAMINOPHEN 500 MG
1000 TABLET ORAL EVERY 8 HOURS PRN
Status: ON HOLD | COMMUNITY

## 2025-06-04 RX ORDER — KETOROLAC TROMETHAMINE 15 MG/ML
15 INJECTION, SOLUTION INTRAMUSCULAR; INTRAVENOUS EVERY 6 HOURS PRN
Status: DISPENSED | OUTPATIENT
Start: 2025-06-04 | End: 2025-06-05

## 2025-06-04 RX ORDER — FLUTICASONE FUROATE AND VILANTEROL 100; 25 UG/1; UG/1
1 POWDER RESPIRATORY (INHALATION) DAILY
Status: DISCONTINUED | OUTPATIENT
Start: 2025-06-04 | End: 2025-06-04

## 2025-06-04 RX ORDER — ACETAMINOPHEN 650 MG/1
650 SUPPOSITORY RECTAL EVERY 4 HOURS PRN
Status: ACTIVE | OUTPATIENT
Start: 2025-06-04

## 2025-06-04 RX ORDER — ENOXAPARIN SODIUM 100 MG/ML
40 INJECTION SUBCUTANEOUS EVERY 24 HOURS
Status: DISPENSED | OUTPATIENT
Start: 2025-06-04

## 2025-06-04 RX ORDER — CALCIUM CARBONATE 500 MG/1
1000 TABLET, CHEWABLE ORAL 4 TIMES DAILY PRN
Status: ACTIVE | OUTPATIENT
Start: 2025-06-04

## 2025-06-04 RX ORDER — ENOXAPARIN SODIUM 100 MG/ML
40 INJECTION SUBCUTANEOUS EVERY 24 HOURS
Status: DISCONTINUED | OUTPATIENT
Start: 2025-06-04 | End: 2025-06-04

## 2025-06-04 RX ORDER — ACETAMINOPHEN 325 MG/1
650 TABLET ORAL EVERY 6 HOURS PRN
Status: DISPENSED | OUTPATIENT
Start: 2025-06-04

## 2025-06-04 RX ORDER — AMOXICILLIN 250 MG
1 CAPSULE ORAL 2 TIMES DAILY PRN
Status: ACTIVE | OUTPATIENT
Start: 2025-06-04

## 2025-06-04 RX ORDER — BUPROPION HYDROCHLORIDE 100 MG/1
100 TABLET, EXTENDED RELEASE ORAL DAILY
Status: DISPENSED | OUTPATIENT
Start: 2025-06-04 | End: 2025-06-10

## 2025-06-04 RX ORDER — LIDOCAINE 40 MG/G
CREAM TOPICAL
Status: ACTIVE | OUTPATIENT
Start: 2025-06-04

## 2025-06-04 RX ADMIN — BUPROPION HYDROCHLORIDE 100 MG: 100 TABLET, EXTENDED RELEASE ORAL at 08:32

## 2025-06-04 RX ADMIN — ACETAMINOPHEN 650 MG: 325 TABLET, FILM COATED ORAL at 19:30

## 2025-06-04 RX ADMIN — PANTOPRAZOLE SODIUM 40 MG: 40 TABLET, DELAYED RELEASE ORAL at 21:59

## 2025-06-04 RX ADMIN — KETOROLAC TROMETHAMINE 15 MG: 15 INJECTION, SOLUTION INTRAMUSCULAR; INTRAVENOUS at 13:28

## 2025-06-04 RX ADMIN — CEFTRIAXONE SODIUM 2 G: 2 INJECTION, POWDER, FOR SOLUTION INTRAMUSCULAR; INTRAVENOUS at 22:14

## 2025-06-04 RX ADMIN — KETOROLAC TROMETHAMINE 15 MG: 15 INJECTION, SOLUTION INTRAMUSCULAR; INTRAVENOUS at 22:09

## 2025-06-04 RX ADMIN — AZITHROMYCIN MONOHYDRATE 250 MG: 500 INJECTION, POWDER, LYOPHILIZED, FOR SOLUTION INTRAVENOUS at 08:33

## 2025-06-04 RX ADMIN — Medication 25 MG: at 13:38

## 2025-06-04 RX ADMIN — KETOROLAC TROMETHAMINE 15 MG: 15 INJECTION, SOLUTION INTRAMUSCULAR; INTRAVENOUS at 03:58

## 2025-06-04 RX ADMIN — ENOXAPARIN SODIUM 40 MG: 40 INJECTION SUBCUTANEOUS at 08:33

## 2025-06-04 RX ADMIN — PANTOPRAZOLE SODIUM 40 MG: 40 TABLET, DELAYED RELEASE ORAL at 08:32

## 2025-06-04 ASSESSMENT — ACTIVITIES OF DAILY LIVING (ADL)
TOILETING_ISSUES: NO
ADLS_ACUITY_SCORE: 18
DRESSING/BATHING_DIFFICULTY: NO
WEAR_GLASSES_OR_BLIND: YES
ADLS_ACUITY_SCORE: 18
FALL_HISTORY_WITHIN_LAST_SIX_MONTHS: NO
ADLS_ACUITY_SCORE: 18
WALKING_OR_CLIMBING_STAIRS_DIFFICULTY: NO
CHANGE_IN_FUNCTIONAL_STATUS_SINCE_ONSET_OF_CURRENT_ILLNESS/INJURY: NO
ADLS_ACUITY_SCORE: 18
DIFFICULTY_EATING/SWALLOWING: NO
VISION_MANAGEMENT: GLASSES
DIFFICULTY_COMMUNICATING: NO
ADLS_ACUITY_SCORE: 18
CONCENTRATING,_REMEMBERING_OR_MAKING_DECISIONS_DIFFICULTY: NO
DOING_ERRANDS_INDEPENDENTLY_DIFFICULTY: NO
ADLS_ACUITY_SCORE: 18
ADLS_ACUITY_SCORE: 18
HEARING_DIFFICULTY_OR_DEAF: NO

## 2025-06-04 NOTE — ED PROVIDER NOTES
History     Chief Complaint   Patient presents with    Chest Wall Pain     HPI  Velma Rizvi is a 59 year old female with history of COPD and DVT (no longer on anticoagulation) who presents with complaint of chest pain.  Patient was seen here yesterday for chest pain and chest x-ray revealed multifocal pneumonia.  She had WBC 19.5.  She had serial troponins that were normal and no ischemic changes on EKG.  She had a normal D-dimer.  She was treated with Augmentin and azithromycin.  She presents here today with feeling much worse. symptoms.  She developed sudden onset of increased left pleuritic chest pain.  She feels short of breath, and reporting hard to breath due to the pain with breathing.    Allergies:  No Known Allergies    Problem List:    Patient Active Problem List    Diagnosis Date Noted    Acute respiratory failure with hypoxia (H) 2025     Priority: Medium    Multifocal pneumonia 2025     Priority: Medium    Personal history of DVT (deep vein thrombosis) 2024     Priority: Medium    COPD, severe (H) 2019     Priority: Medium    Peripheral edema 2018     Priority: Medium    Obesity, Class I, BMI 30-34.9 2017     Priority: Medium    Varicose veins of legs 2011     Priority: Medium     See abnormal US study.          Past Medical History:    Past Medical History:   Diagnosis Date    COPD 2019    Phlebitis and thrombophlebitis 10/12/2012    PONV (postoperative nausea and vomiting)        Past Surgical History:    Past Surgical History:   Procedure Laterality Date    COLONOSCOPY N/A 2018    Procedure: COLONOSCOPY;  COLONOSCOPY;  Surgeon: Paresh Curran MD;  Location:  GI    [facility] TREATMENT INCOMPLETE  SURG, ANY TRIMESTER      D & C  s/p miscarriage    LAPAROSCOPIC CHOLECYSTECTOMY N/A 2019    Procedure: LAPAROSCOPIC CHOLECYSTECTOMY;  Surgeon: Rob Gonzales DO;  Location:  OR    [facility] NONSPECIFIC PROCEDURE      Right hand  surgery- tendon repair       Family History:    Family History   Problem Relation Age of Onset    Cancer Mother          - lung cancer with mets.    Respiratory Father         asthma    Lung Cancer Brother         Smoker    Hypertension Brother     Diabetes Brother     Cancer Maternal Grandmother         breast- 48 fatal age 51    Hypertension Son     Diabetes Maternal Uncle        Social History:  Marital Status:   [2]  Social History     Tobacco Use    Smoking status: Former     Current packs/day: 0.00     Average packs/day: 1 pack/day for 37.8 years (37.8 ttl pk-yrs)     Types: Cigarettes     Start date: 1985     Quit date: 2023     Years since quittin.2     Passive exposure: Never    Smokeless tobacco: Never    Tobacco comments:     already quit   Vaping Use    Vaping status: Never Used   Substance Use Topics    Alcohol use: Not Currently     Comment: rarely    Drug use: No        Medications:    albuterol (VENTOLIN HFA) 108 (90 Base) MCG/ACT inhaler  amoxicillin-clavulanate (AUGMENTIN) 875-125 MG tablet  azithromycin (ZITHROMAX) 250 MG tablet  COMPRESSION STOCKINGS  Fluticasone-Umeclidin-Vilant (TRELEGY ELLIPTA) 100-62.5-25 MCG/ACT oral inhaler  naltrexone (DEPADE/REVIA) 50 MG tablet  omeprazole (PRILOSEC) 40 MG DR capsule  phentermine (LOMAIRA) 8 MG tablet  topiramate (TOPAMAX) 25 MG tablet  buPROPion (WELLBUTRIN SR) 100 MG 12 hr tablet  Cholecalciferol (VITAMIN D3) 75 MCG (3000 UT) TABS  estradiol (VAGIFEM) 10 MCG TABS vaginal tablet  ibuprofen (ADVIL/MOTRIN) 200 MG tablet  phentermine (ADIPEX-P) 37.5 MG capsule        Review of Systems  As mentioned above in the history present illness. All other systems were reviewed and are negative.    Physical Exam   BP: (!) 116/105  Pulse: 108  Temp: 98.1  F (36.7  C)  Resp: 20  Weight: 84.8 kg (187 lb)  SpO2: 93 %      Physical Exam  Constitutional:       General: She is in acute distress (she is holding her hand over her left breast,  appears uncomfortable.).      Appearance: She is well-developed. She is not ill-appearing.   HENT:      Head: Normocephalic and atraumatic.      Right Ear: External ear normal.      Left Ear: External ear normal.      Nose: Nose normal.      Mouth/Throat:      Mouth: Mucous membranes are moist.   Eyes:      Conjunctiva/sclera: Conjunctivae normal.   Cardiovascular:      Rate and Rhythm: Normal rate and regular rhythm.      Heart sounds: Normal heart sounds. No murmur heard.  Pulmonary:      Effort: Pulmonary effort is normal. No respiratory distress.      Breath sounds: Examination of the left-lower field reveals decreased breath sounds. Decreased breath sounds present.   Musculoskeletal:         General: Normal range of motion.   Skin:     General: Skin is warm and dry.      Findings: No rash.   Neurological:      General: No focal deficit present.      Mental Status: She is alert and oriented to person, place, and time.         ED Course     ED Course as of 06/03/25 2323   Tue Jun 03, 2025 2255 Patient's SPO2 dropped to 85% on room air upon returning from CT. She was placed on oxygen per nasal canula 3L.      Procedures              EKG Interpretation:      Interpreted by MIN Russell CNP  Time reviewed:9:46 pm   Symptoms at time of EKG: left pleuritic pain   Rhythm: Normal sinus   Rate: Normal  Axis: Right  Ectopy: None  Conduction: Right bundle branch block (complete)  ST Segments/ T Waves: No ST elevation noted  Q Waves: None  Comparison to prior: T-wave inversion no longer evident on inferior leads when compared to EKG from yesterday.    Clinical Impression: NSR with right BBB         Results for orders placed or performed during the hospital encounter of 06/03/25 (from the past 24 hours)   EKG 12-lead, tracing only   Result Value Ref Range    Systolic Blood Pressure  mmHg    Diastolic Blood Pressure  mmHg    Ventricular Rate 91 BPM    Atrial Rate 91 BPM    MN Interval 144 ms    QRS Duration  142 ms     ms    QTc 472 ms    P Axis 71 degrees    R AXIS 94 degrees    T Axis 66 degrees    Interpretation ECG       Sinus rhythm  Right bundle branch block  Abnormal ECG  When compared with ECG of 02-Jun-2025 05:43,  QRS axis Shifted right  T wave inversion no longer evident in Inferior leads     CBC with platelets differential    Narrative    The following orders were created for panel order CBC with platelets differential.  Procedure                               Abnormality         Status                     ---------                               -----------         ------                     CBC with platelets and ...[[id number]]  Abnormal            Final result                 Please view results for these tests on the individual orders.   Comprehensive metabolic panel   Result Value Ref Range    Sodium 138 135 - 145 mmol/L    Potassium 3.6 3.4 - 5.3 mmol/L    Carbon Dioxide (CO2) 21 (L) 22 - 29 mmol/L    Anion Gap 14 7 - 15 mmol/L    Urea Nitrogen 9.2 6.0 - 20.0 mg/dL    Creatinine 0.57 0.51 - 0.95 mg/dL    GFR Estimate >90 >60 mL/min/1.73m2    Calcium 9.4 8.8 - 10.4 mg/dL    Chloride 103 98 - 107 mmol/L    Glucose 144 (H) 70 - 99 mg/dL    Alkaline Phosphatase 88 40 - 150 U/L    AST 21 0 - 45 U/L    ALT 33 0 - 50 U/L    Protein Total 7.2 6.4 - 8.3 g/dL    Albumin 3.8 3.5 - 5.2 g/dL    Bilirubin Total 0.3 <=1.2 mg/dL   Lactic acid whole blood with 1x repeat in 2 hr when >2   Result Value Ref Range    Lactic Acid, Initial 2.1 (H) 0.7 - 2.0 mmol/L   Troponin T, High Sensitivity   Result Value Ref Range    Troponin T, High Sensitivity 12 <=14 ng/L   NT-proBNP   Result Value Ref Range    NT-proBNP 342 (H) 0 - 226 pg/mL   Blood gas venous   Result Value Ref Range    pH Venous 7.37 7.32 - 7.43    pCO2 Venous 42 40 - 50 mm Hg    pO2 Venous 25 25 - 47 mm Hg    Bicarbonate Venous 24 21 - 28 mmol/L    Base Excess/Deficit Venous -1.0 -3.0 - 3.0 mmol/L    FIO2 0     Oxyhemoglobin Venous 46 (L) 70 - 75 %    O2  Sat, Venous 46.4 (L) 70.0 - 75.0 %    Narrative    In healthy individuals, oxyhemoglobin (O2Hb) and oxygen saturation (SO2) are approximately equal. In the presence of dyshemoglobins, oxyhemoglobin can be considerably lower than oxygen saturation.   CBC with platelets and differential   Result Value Ref Range    WBC Count 13.7 (H) 4.0 - 11.0 10e3/uL    RBC Count 4.12 3.80 - 5.20 10e6/uL    Hemoglobin 12.6 11.7 - 15.7 g/dL    Hematocrit 37.1 35.0 - 47.0 %    MCV 90 78 - 100 fL    MCH 30.6 26.5 - 33.0 pg    MCHC 34.0 31.5 - 36.5 g/dL    RDW 13.1 10.0 - 15.0 %    Platelet Count 254 150 - 450 10e3/uL    % Neutrophils 79 %    % Lymphocytes 11 %    % Monocytes 8 %    % Eosinophils 2 %    % Basophils 0 %    % Immature Granulocytes 1 %    NRBCs per 100 WBC 0 <1 /100    Absolute Neutrophils 10.8 (H) 1.6 - 8.3 10e3/uL    Absolute Lymphocytes 1.5 0.8 - 5.3 10e3/uL    Absolute Monocytes 1.1 0.0 - 1.3 10e3/uL    Absolute Eosinophils 0.2 0.0 - 0.7 10e3/uL    Absolute Basophils 0.1 0.0 - 0.2 10e3/uL    Absolute Immature Granulocytes 0.1 <=0.4 10e3/uL    Absolute NRBCs 0.0 10e3/uL   CT Chest Pulmonary Embolism w Contrast    Narrative    EXAM: CT CHEST PULMONARY EMBOLISM W CONTRAST  LOCATION: [facility]  DATE: 06/03/2025    INDICATION: Left-sided pleuritic chest pain (multifocal pneumonia on x-ray yesterday).  COMPARISON: Chest CTA on 06/02/2024.  TECHNIQUE: CT chest pulmonary angiogram during arterial phase injection of IV contrast. Multiplanar reformats and MIP reconstructions were performed. Dose reduction techniques were used.   CONTRAST: Isovue 370: 70 mL.    FINDINGS:  ANGIOGRAM CHEST: No evidence of pulmonary embolism. The main pulmonary artery is enlarged measuring 3.7 cm in diameter, this can be seen with pulmonary hypertension. No evidence of thoracic aortic aneurysm or dissection. No evidence for right heart   strain.    LUNGS AND PLEURA: No pleural effusion or pneumothorax. Upper lobes  predominant emphysematous changes. Bilateral patchy pulmonary opacities, most prominent in the right middle lobe and lingula, likely infectious. There is a 1.2 x 0.8 cm spiculated nodule   in the right upper lobe (series 6 image 91), not significantly changed as compared to the 02/11/2025 exam.    MEDIASTINUM/AXILLAE: Mild cardiomegaly. Small amount of fluid along the superior aortic recess. Multiple prominent mediastinal and hilar lymph nodes, indeterminate, could be reactive.    CORONARY ARTERY CALCIFICATION: Mild.    UPPER ABDOMEN: Limited evaluation of the upper abdomen due to lack of coverage. 3 cm partially exophytic renal cyst at the upper pole of the left kidney. Right upper quadrant post-cholecystectomy clips.    MUSCULOSKELETAL: No suspicious osseous lesion.      Impression    IMPRESSION:  1.  No evidence of pulmonary embolism. The main pulmonary artery is enlarged measuring 3.7 cm in diameter, this can be seen with pulmonary hypertension.  2.  Bilateral patchy pulmonary opacities, most prominent in the right middle lobe and lingula, likely infectious.  3.  A 1.2 x 0.8 cm consolidative nodule in the right upper lobe, not significantly changed as compared to the 02/11/2025 exam.  4.  Extensive upper lobes predominant emphysematous changes.       Medications   sodium chloride 0.9% BOLUS 1,000 mL (1,000 mLs Intravenous $New Bag 6/3/25 2230)   ondansetron (ZOFRAN) injection 4 mg (4 mg Intravenous $Given 6/3/25 2156)   HYDROmorphone (PF) (DILAUDID) injection 0.5 mg (0.5 mg Intravenous $Given 6/3/25 2156)   ketorolac (TORADOL) injection 15 mg (15 mg Intravenous $Given 6/3/25 2157)   sodium chloride 0.9 % bag for CT scan flush (70 mLs As instructed $Given 6/3/25 2221)   iopamidol (ISOVUE-370) solution 500 mL (70 mLs Intravenous $Given 6/3/25 2221)   cefTRIAXone (ROCEPHIN) 2 g vial to attach to  ml bag for ADULTS or NS 50 ml bag for PEDS (2 g Intravenous $New Bag 6/3/25 2248)   HYDROmorphone (PF) (DILAUDID)  injection 0.5 mg (0.5 mg Intravenous $Given 6/3/25 9818)       Assessments & Plan (with Medical Decision Making)     59 year old female with history of COPD and DVT (no longer on anticoagulation) who presents with complaint of chest pain.  Patient was seen here yesterday for chest pain and chest x-ray revealed multifocal pneumonia.  She had WBC 19.5.  She had serial troponins that were normal and no ischemic changes on EKG.  She had a normal D-dimer.  She was treated with Augmentin and azithromycin.  She presents here today with feeling much worse. symptoms.  She developed sudden onset of increased left pleuritic chest pain.  She feels short of breath, and reporting hard to breath due to the pain with breathing.    Exam, she appears distressed and in pain.  She is holding her hands over her left chest.  Lung sounds are diminished in the left lung base.  She is afebrile.  She is mildly hypertensive.  Mild tachycardia with heart rate 108 bpm.  SpO2 93% on room air.    WBC 13.7 which is down from 19.5 yesterday.  Lactic acid elevated at 2.1  VBG is unremarkable.  Normal electrolytes.  Normal kidney function.  Glucose 144  EKG NSR with right BBB, no acute ischemic changes. T-wave inversion no longer evidence when compared to EKG from  yesterday.  Troponin normal.  NT proBNP 342.    Chest CT-PE study reveals bilateral patchy pulmonary opacities most prominent in the right middle lobe and lingula.  She has a 1.2 x 0.8 cm consolidation nodule in the right upper lobe that does not appear significantly changed compared to previous imaging.  Extensive upper lobe emphysematous changes.  No evidence of PE.  The main pulmonary artery is a large which can be seen in pulmonary hypertension.    Patient's SpO2 dropped to 85% when she had return from CT.  She was placed on 3 L of oxygen per nasal cannula with SPO2 up to 94%.  Patient failing outpatient management given her worsening symptoms.  She will need admission for oxygen, IV  antibiotics and pain control.    Spoke with the on-call tele hospitalist, , who accepts patient for admission.    New Prescriptions    No medications on file       Final diagnoses:   Acute respiratory failure with hypoxia (H)   Multifocal pneumonia       6/3/2025   [facility] EMERGENCY DEPT       Shanthi, MIN Banegas CNP  06/03/25 5170

## 2025-06-04 NOTE — PLAN OF CARE
"Goal Outcome Evaluation:    Plan of Care Reviewed With: patient  Overall Patient Progress: improving  Pt is A/o X4. VSS on 4L via NC. PT is up ad denys. PIV saline locked. PRN toradol x 1 for chest pain. Tolerating diet. Encouraged pt to use IS. Able to get to 1500. Pt denies cough, but writer encouraged splinting with cough to help with chest pain.   /45 (BP Location: Left arm)   Pulse 88   Temp 98.3  F (36.8  C) (Oral)   Resp 18   Ht 1.676 m (5' 6\")   Wt 89.8 kg (198 lb)   LMP 03/14/2017 (Exact Date)   SpO2 92%   BMI 31.96 kg/m        "

## 2025-06-04 NOTE — PROGRESS NOTES
S-(situation): Patient arrives to room 270 via cart from ED    B-(background): shortness of breath requiring supplemental oxygen.     A-(assessment): Pt arrived on 3 LPM of oxygen via nasal canula, she is able to self transfer and makes no complaints of shortness of breath at the time of arrival. Lungs are clear but slightly diminished. No wheezing heard. Initial vitals are stable. Plan of care reviewed with Pt and and Pt  who verbalized an understanding of the plan to follow.    R-(recommendations): Orders reviewed with Pt and . Will monitor patient per MD orders.     Inpatient nursing criteria listed below were met:    Health care directives status obtained and documented: Yes  VTE ordered/documented: Yes  Skin issues/needs documented:Yes  Isolation addressed and Signage used: Yes  Fall Prevention: Care plan updated Yes Education given and documented Yes  Care Plan initiated and Co-Morbidities added: Yes  Education Assessment documented:Yes  Admission Education Documented: Yes  If present CAUTI/CLABI Education done: NA  New medication patient education completed and documented (Possible Side Effects of Common Medications handout): Yes  Allergies Reviewed: Yes  Admission Medication Reconciliation completed: Yes  Home medications if not able to send immediately home with family stored here: Yes  Reminder note placed in discharge instructions regarding home meds: Yes  Individualized care needs/preferences addressed and charted: Yes  Provider Notified that patient has arrived to the unit: Yes

## 2025-06-04 NOTE — MEDICATION SCRIBE - ADMISSION MEDICATION HISTORY
Medication Scribe Admission Medication History    Admission medication history is complete. The information provided in this note is only as accurate as the sources available at the time of the update.    Information Source(s): Patient via in-person    Pertinent Information: Med scribe review post RN review. Patient started both ABX :Augmentin & azithromycin on morning of 6/3/2025.     Changes made to PTA medication list:  Added: Acetaminophen as reported by patient   Deleted: None  Changed: None    Allergies reviewed with patient and updates made in EHR: yes    Medication History Completed By: MIAH STOKES 6/4/2025 12:26 PM    PTA Med List   Medication Sig Note Last Dose/Taking    acetaminophen (TYLENOL) 500 MG tablet Take 1,000 mg by mouth every 8 hours as needed for mild pain.  6/3/2025 Bedtime    albuterol (VENTOLIN HFA) 108 (90 Base) MCG/ACT inhaler Inhale 2 puffs into the lungs every 4 hours as needed for shortness of breath or wheezing.  6/3/2025 Evening    amoxicillin-clavulanate (AUGMENTIN) 875-125 MG tablet Take 1 tablet by mouth 2 times daily for 9 days. 6/4/2025: Started 6/3/24 - 2 doses  6/3/2025 Bedtime    azithromycin (ZITHROMAX) 250 MG tablet Take 1 tablet (250 mg) by mouth daily for 4 days. 6/4/2025: First dose  6/3/2025 Morning    buPROPion (WELLBUTRIN SR) 100 MG 12 hr tablet Take 1 tablet (100 mg) by mouth every morning for 7 days, THEN 1 tablet (100 mg) 2 times daily for 23 days. 1 tablet by mouth once daily in AM for 7 days then 1 tablet BID (AM and early PM) thereafter. Take with naltrexone..  6/3/2025 Morning    Cholecalciferol (VITAMIN D3) 75 MCG (3000 UT) TABS Take 75 mcg by mouth daily  6/3/2025 Morning    COMPRESSION STOCKINGS 1 each daily Wear daily size large, two pair  Taking    estradiol (VAGIFEM) 10 MCG TABS vaginal tablet Place 1 tablet (10 mcg) vaginally twice a week.  6/3/2025 Morning    Fluticasone-Umeclidin-Vilant (TRELEGY ELLIPTA) 100-62.5-25 MCG/ACT oral inhaler Inhale 1 puff  into the lungs daily.  6/3/2025    ibuprofen (ADVIL/MOTRIN) 200 MG tablet Take 800 mg by mouth every 8 hours as needed for pain  6/3/2025 Bedtime    naltrexone (DEPADE/REVIA) 50 MG tablet Naltrexone 50 mg tablet: 0.5 tablet by mouth daily in AM for 7 days then 0.5 tablet BID (AM and early PM) thereafter (Patient taking differently: Naltrexone 50 mg tablet: 0.5 tablet by mouth daily in AM for 7 days then 0.5 tablet BID (AM and early PM) thereafter)  6/3/2025 Morning    omeprazole (PRILOSEC) 40 MG DR capsule Take 1 capsule (40 mg) by mouth daily.  6/3/2025 Morning

## 2025-06-04 NOTE — ED TRIAGE NOTES
Patient here yesterday with pneumonia, in tonight with worsening L sided chest discomfort.     Triage Assessment (Adult)       Row Name 06/03/25 2120          Triage Assessment    Airway WDL WDL        Respiratory WDL    Respiratory WDL X        Skin Circulation/Temperature WDL    Skin Circulation/Temperature WDL WDL

## 2025-06-04 NOTE — PROGRESS NOTES
"[facility]    Medicine Progress Note - Hospitalist Service    Date of Admission:  6/3/2025    Patient admitted after midnight, no charge visit    Assessment & Plan   59yoF with COPD, DVT, and GERD presented to the ED with left-sided chest pain and SOB.  She was seen in the ED on 6/2 and diagnosed with multifocal pneumonia.  She was discharged with Augmentin and azithromycin.  Her pain got worse and so she returned to the ED.  The pain is worse with deep inspiration and cough.  She felt short of breath because she cannot take in a full breath.  Her cough is dry.  She denies fever, chills, n/v/d, abdominal pain,wheezing     SIRS (present on admission) resolved (tachycardia, elevated WBC, elevated lactic acid resolved with fluid resuscitation) .  Multifocal pneumonia  Acute hypoxic respiratory failure  Pleurisy  -failed outpatient antibiotics  -continue ceftriaxone and azithromycin  -Strep Ag negative  -legionella ag negative  -Toradol prn  -wean oxygen as tolerated; on 4L  -add flutter valve  -CTA: neg PE, bilateral opacities  -lactic acid 2.1-->1.8  -pain control with ketorolac as she is not deep breathing due to pleuritic discomfort     GERD/GI prophylaxis  -continue PPI     COPD  -continue PTA inhalers     H/o DVT  -not on anticoagulation  -CTA: no PE     Obesity  -weaning of bupropion for weight loss             Diet: Combination Diet Regular Diet Adult    DVT Prophylaxis: Enoxaparin (Lovenox) SQ  Hoskins Catheter: Not present  Lines: None     Cardiac Monitoring: None  Code Status: Full Code      Clinically Significant Risk Factors Present on Admission          # Hyperchloremia: Highest Cl = 108 mmol/L in last 2 days, will monitor as appropriate                        # Obesity: Estimated body mass index is 31.96 kg/m  as calculated from the following:    Height as of this encounter: 1.676 m (5' 6\").    Weight as of this encounter: 89.8 kg (198 lb).              Social Drivers of " Health    Tobacco Use: Medium Risk (6/3/2025)    Patient History     Smoking Tobacco Use: Former     Smokeless Tobacco Use: Never     Passive Exposure: Never   Physical Activity: Insufficiently Active (11/3/2024)    Exercise Vital Sign     Days of Exercise per Week: 1 day     Minutes of Exercise per Session: 20 min   Social Connections: Unknown (11/3/2024)    Social Connection and Isolation Panel [NHANES]     Frequency of Social Gatherings with Friends and Family: More than three times a week          Disposition Plan     Medically Ready for Discharge: Anticipated in 2-4 Days           The patient's care was discussed with the entire care team.    Octavia Barr CNP  Hospitalist Service  [facility]  Securely message with [employer] (more info)  Text page via [employer] Paging/Directory   ______________________________________________________________________    Interval History   No acute events overnight    Physical Exam   Vital Signs: Temp: 98.3  F (36.8  C) Temp src: Oral BP: 105/45 Pulse: 88   Resp: 18 SpO2: 93 % O2 Device: Nasal cannula Oxygen Delivery: 4 LPM  Weight: 198 lbs 0 oz    Gen:  Lying in bed no acute distress  HEENT:  normocephalic, atraumatic, oropharynx clear  Resp:  decreased bibasilarly  Card:  S1,S2, RRR no murmur, rub or gallop  Abd:  Soft nondistended, non tender,  normoactive bowel sounds   Neuro:  Neuro exam non focal      Medical Decision Making           Data     I have personally reviewed the following data over the past 24 hrs:    11.0  \   11.5 (L)   / 228     140 108 (H) 6.8 /  116 (H)   3.7 22 0.50 (L) \     ALT: 33 AST: 21 AP: 88 TBILI: 0.3   ALB: 3.8 TOT PROTEIN: 7.2 LIPASE: N/A     Trop: 12 BNP: 342 (H)     Procal: 0.97 (H) CRP: N/A Lactic Acid: 1.8         Imaging results reviewed over the past 24 hrs:   Recent Results (from the past 24 hours)   CT Chest Pulmonary Embolism w Contrast    Narrative    EXAM: CT CHEST PULMONARY EMBOLISM W CONTRAST  LOCATION: [facility]  DATE: 06/03/2025    INDICATION: Left-sided pleuritic chest pain (multifocal pneumonia on x-ray yesterday).  COMPARISON: Chest CTA on 06/02/2024.  TECHNIQUE: CT chest pulmonary angiogram during arterial phase injection of IV contrast. Multiplanar reformats and MIP reconstructions were performed. Dose reduction techniques were used.   CONTRAST: Isovue 370: 70 mL.    FINDINGS:  ANGIOGRAM CHEST: No evidence of pulmonary embolism. The main pulmonary artery is enlarged measuring 3.7 cm in diameter, this can be seen with pulmonary hypertension. No evidence of thoracic aortic aneurysm or dissection. No evidence for right heart   strain.    LUNGS AND PLEURA: No pleural effusion or pneumothorax. Upper lobes predominant emphysematous changes. Bilateral patchy pulmonary opacities, most prominent in the right middle lobe and lingula, likely infectious. There is a 1.2 x 0.8 cm spiculated nodule   in the right upper lobe (series 6 image 91), not significantly changed as compared to the 02/11/2025 exam.    MEDIASTINUM/AXILLAE: Mild cardiomegaly. Small amount of fluid along the superior aortic recess. Multiple prominent mediastinal and hilar lymph nodes, indeterminate, could be reactive.    CORONARY ARTERY CALCIFICATION: Mild.    UPPER ABDOMEN: Limited evaluation of the upper abdomen due to lack of coverage. 3 cm partially exophytic renal cyst at the upper pole of the left kidney. Right upper quadrant post-cholecystectomy clips.    MUSCULOSKELETAL: No suspicious osseous lesion.      Impression    IMPRESSION:  1.  No evidence of pulmonary embolism. The main pulmonary artery is enlarged measuring 3.7 cm in diameter, this can be seen with pulmonary hypertension.  2.  Bilateral patchy pulmonary opacities, most prominent in the right middle lobe and lingula, likely infectious.  3.  A 1.2 x 0.8 cm consolidative nodule in the right upper lobe, not significantly changed as compared to the 02/11/2025  exam.  4.  Extensive upper lobes predominant emphysematous changes.

## 2025-06-04 NOTE — H&P
"[facility]    History and Physical - Hospitalist Service       Date of Admission:  6/3/2025    Assessment & Plan    Multifocal pneumonia  Acute hypoxic respiratory failure  Pleurisy  -failed outpatient antibiotics  -continue ceftriaxone and azithromycin  -Strep Ag pending  -Toradol prn  -wean oxygen as tolerated; on 3L  -CTA: neg PE, bilateral opacities  -lactic acid 2.1-->1.8    GERD  -continue PPI    COPD  -continue PTA inhalers    H/o DVT  -not on anticoagulation  -CTA: no PE    Obesity  -weaning of bupropion for weight loss     Diet: Combination Diet Regular Diet Adult    DVT Prophylaxis: Enoxaparin (Lovenox) SQ  Hoskins Catheter: Not present  Lines: None     Code Status:  FULL    Clinically Significant Risk Factors Present on Admission      # Obesity: Estimated body mass index is 31.96 kg/m  as calculated from the following:    Height as of this encounter: 1.676 m (5' 6\").    Weight as of this encounter: 89.8 kg (198 lb).              Disposition Plan      Expected Discharge Date: 06/05/2025                The patient's care was discussed with the patient.        LEANNE ROOT MD  [facility]  Securely message with the [employer]oc[employer] Web Console (learn more here)  Text page via [facility] Paging/Directory      Visit/Communication Style   Virtual (Video) communication was used to evaluate Velma.  Velma consented to the use of video communication: yes  Video START time: 0035, 6/4/2025  Video STOP time: 0045, 6/4/2025   Patient's location: [facility]   Provider's location during the visit: [facility] Tele-medicine site        ______________________________________________________________________    Chief Complaint   Chest pain    History of Present Illness   59yoF with COPD, DVT, and GERD presented to the ED with left-sided chest pain and SOB.  She was seen in the ED on 6/2 and diagnosed with multifocal pneumonia.  She was " discharged with Augmentin and azithromycin.  Her pain got worse and so she returned to the ED.  The pain is worse with deep inspiration and cough.  She feels short of breath because she cannot take in a full breath.  Her cough is dry.  She denies fever, chills, n/v/d, abdominal pain,wheezing     Review of Systems    General: negative for fever, chills, sweats, weakness  Eyes: negative for blurred vision, loss of vision  Ear Nose and Throat: negative for pharyngitis, speech or swallowing difficulties  Respiratory:  negative for sputum production, wheezing, JAY,   Cardiology:  negative for chest pain, palpitations, orthopnea, PND, edema, syncope   Gastrointestinal: negative for abdominal pain, nausea, vomiting, diarrhea, constipation, hematemesis, melena or hematochezia  Genitourinary: negative for frequency, urgency, dysuria, hematuria   Neurological: negative for focal weakness, paresthesia    Past Medical History    I have reviewed this patient's medical history and updated it with pertinent information if needed.   Past Medical History:   Diagnosis Date    COPD 2019    Phlebitis and thrombophlebitis 10/12/2012    PONV (postoperative nausea and vomiting)        Past Surgical History   I have reviewed this patient's surgical history and updated it with pertinent information if needed.  Past Surgical History:   Procedure Laterality Date    COLONOSCOPY N/A 2018    Procedure: COLONOSCOPY;  COLONOSCOPY;  Surgeon: Paresh Curran MD;  Location:  GI    HC TREATMENT INCOMPLETE  SURG, ANY TRIMESTER      D & C  s/p miscarriage    LAPAROSCOPIC CHOLECYSTECTOMY N/A 2019    Procedure: LAPAROSCOPIC CHOLECYSTECTOMY;  Surgeon: Rob Gonzales DO;  Location:  OR    [facility] NONSPECIFIC PROCEDURE      Right hand surgery- tendon repair       Social History   I have reviewed this patient's social history and updated it with pertinent information if needed.  Social History     Tobacco Use    Smoking  status: Former     Current packs/day: 0.00     Average packs/day: 1 pack/day for 37.8 years (37.8 ttl pk-yrs)     Types: Cigarettes     Start date: 1985     Quit date: 2023     Years since quittin.2     Passive exposure: Never    Smokeless tobacco: Never    Tobacco comments:     already quit   Vaping Use    Vaping status: Never Used   Substance Use Topics    Alcohol use: Not Currently     Comment: rarely    Drug use: No       Family History   I have reviewed this patient's family history and updated it with pertinent information if needed.  Family History   Problem Relation Age of Onset    Cancer Mother          - lung cancer with mets.    Respiratory Father         asthma    Lung Cancer Brother         Smoker    Hypertension Brother     Diabetes Brother     Cancer Maternal Grandmother         breast- 48 fatal age 51    Hypertension Son     Diabetes Maternal Uncle        Prior to Admission Medications   Prior to Admission Medications   Prescriptions Last Dose Informant Patient Reported? Taking?   COMPRESSION STOCKINGS 6/3/2025 Self No Yes   Si each daily Wear daily size large, two pair   Cholecalciferol (VITAMIN D3) 75 MCG (3000 UT) TABS   Yes No   Sig: Take 75 mcg by mouth daily   Fluticasone-Umeclidin-Vilant (TRELEGY ELLIPTA) 100-62.5-25 MCG/ACT oral inhaler 6/3/2025  No Yes   Sig: Inhale 1 puff into the lungs daily.   albuterol (VENTOLIN HFA) 108 (90 Base) MCG/ACT inhaler 6/3/2025  No Yes   Sig: Inhale 2 puffs into the lungs every 4 hours as needed for shortness of breath or wheezing.   amoxicillin-clavulanate (AUGMENTIN) 875-125 MG tablet 6/3/2025  No Yes   Sig: Take 1 tablet by mouth 2 times daily for 9 days.   azithromycin (ZITHROMAX) 250 MG tablet 6/3/2025  No Yes   Sig: Take 1 tablet (250 mg) by mouth daily for 4 days.   buPROPion (WELLBUTRIN SR) 100 MG 12 hr tablet   No No   Sig: Take 1 tablet (100 mg) by mouth every morning for 7 days, THEN 1 tablet (100 mg) 2 times daily for 23  days. 1 tablet by mouth once daily in AM for 7 days then 1 tablet BID (AM and early PM) thereafter. Take with naltrexone..   estradiol (VAGIFEM) 10 MCG TABS vaginal tablet   No No   Sig: Place 1 tablet (10 mcg) vaginally twice a week.   ibuprofen (ADVIL/MOTRIN) 200 MG tablet  Self Yes No   Sig: Take 800 mg by mouth every 8 hours as needed for pain   naltrexone (DEPADE/REVIA) 50 MG tablet 6/3/2025  No Yes   Sig: Naltrexone 50 mg tablet: 0.5 tablet by mouth daily in AM for 7 days then 0.5 tablet BID (AM and early PM) thereafter   omeprazole (PRILOSEC) 40 MG DR capsule 6/3/2025  No Yes   Sig: Take 1 capsule (40 mg) by mouth daily.   phentermine (ADIPEX-P) 37.5 MG capsule   No No   Sig: TAKE 1 CAPSULE (37.5 MG) BY MOUTH EVERY MORNING.   phentermine (LOMAIRA) 8 MG tablet More than a month  No Yes   Sig: Take 0.5 tablets (4 mg) by mouth every morning (before breakfast).   topiramate (TOPAMAX) 25 MG tablet 6/2/2025  No Yes   Sig: Take 1 tablet (25 mg) by mouth at bedtime.      Facility-Administered Medications: None     Allergies   No Known Allergies    Physical Exam   Vital Signs: Temp: 98.3  F (36.8  C) Temp src: Oral BP: 131/63 Pulse: 86   Resp: 18 SpO2: 94 % O2 Device: Nasal cannula Oxygen Delivery: 3 LPM  Weight: 198 lbs 0 oz    Gen:  Well-developed, well-nourished, in no acute distress, lying semi-supine in hospital stretcher  HEENT:  Anicteric sclera, PER, hearing intact to voice  Resp:  No accessory muscle use, breath sounds clear; no wheezes no rales no rhonchi  Card:  No murmur, normal S1, S2   Abd:  Soft per RN exam, no TTP, non-distended, normoactive bowel sounds are present  Musc:  Normal strength and movement of the major muscle groups without obvious deformity  Psych:  Good insight, oriented to person, place and time, not anxious, not agitated    Data     Recent Labs   Lab 06/03/25  2155 06/02/25  0622   WBC 13.7* 19.5*   HGB 12.6 13.3   MCV 90 91    219    135   POTASSIUM 3.6 3.9   CHLORIDE 103  101   CO2 21* 23   BUN 9.2 9.6   CR 0.57 0.55   ANIONGAP 14 11   ALEKSANDRA 9.4 9.1   * 158*   ALBUMIN 3.8 3.9   PROTTOTAL 7.2 6.8   BILITOTAL 0.3 0.8   ALKPHOS 88 70   ALT 33 36   AST 21 29         Recent Results (from the past 24 hours)   CT Chest Pulmonary Embolism w Contrast    Narrative    EXAM: CT CHEST PULMONARY EMBOLISM W CONTRAST  LOCATION: [facility]  DATE: 06/03/2025    INDICATION: Left-sided pleuritic chest pain (multifocal pneumonia on x-ray yesterday).  COMPARISON: Chest CTA on 06/02/2024.  TECHNIQUE: CT chest pulmonary angiogram during arterial phase injection of IV contrast. Multiplanar reformats and MIP reconstructions were performed. Dose reduction techniques were used.   CONTRAST: Isovue 370: 70 mL.    FINDINGS:  ANGIOGRAM CHEST: No evidence of pulmonary embolism. The main pulmonary artery is enlarged measuring 3.7 cm in diameter, this can be seen with pulmonary hypertension. No evidence of thoracic aortic aneurysm or dissection. No evidence for right heart   strain.    LUNGS AND PLEURA: No pleural effusion or pneumothorax. Upper lobes predominant emphysematous changes. Bilateral patchy pulmonary opacities, most prominent in the right middle lobe and lingula, likely infectious. There is a 1.2 x 0.8 cm spiculated nodule   in the right upper lobe (series 6 image 91), not significantly changed as compared to the 02/11/2025 exam.    MEDIASTINUM/AXILLAE: Mild cardiomegaly. Small amount of fluid along the superior aortic recess. Multiple prominent mediastinal and hilar lymph nodes, indeterminate, could be reactive.    CORONARY ARTERY CALCIFICATION: Mild.    UPPER ABDOMEN: Limited evaluation of the upper abdomen due to lack of coverage. 3 cm partially exophytic renal cyst at the upper pole of the left kidney. Right upper quadrant post-cholecystectomy clips.    MUSCULOSKELETAL: No suspicious osseous lesion.      Impression    IMPRESSION:  1.  No evidence of pulmonary  embolism. The main pulmonary artery is enlarged measuring 3.7 cm in diameter, this can be seen with pulmonary hypertension.  2.  Bilateral patchy pulmonary opacities, most prominent in the right middle lobe and lingula, likely infectious.  3.  A 1.2 x 0.8 cm consolidative nodule in the right upper lobe, not significantly changed as compared to the 02/11/2025 exam.  4.  Extensive upper lobes predominant emphysematous changes.       I have seen and evaluated Velma Falker located in [place], [place] using all aspects of audio and visual telemedicine with patient's consent and nursing assistance. I am located in [place], [place].

## 2025-06-04 NOTE — PLAN OF CARE
Goal Outcome Evaluation: Progressing      Oxygen saturations remain stable on 3 LPM via nasal canula.   Pt denies shortness of breath.   Urine sample collected and sent.   Toradol given once for complaints of left side pain.   Pt has been up independently with no complication.

## 2025-06-05 VITALS
HEIGHT: 66 IN | RESPIRATION RATE: 20 BRPM | DIASTOLIC BLOOD PRESSURE: 73 MMHG | TEMPERATURE: 98.1 F | HEART RATE: 86 BPM | BODY MASS INDEX: 31.87 KG/M2 | OXYGEN SATURATION: 90 % | WEIGHT: 198.3 LBS | SYSTOLIC BLOOD PRESSURE: 116 MMHG

## 2025-06-05 LAB
ANION GAP SERPL CALCULATED.3IONS-SCNC: 10 MMOL/L (ref 7–15)
BACTERIA SPT CULT: NORMAL
BUN SERPL-MCNC: 5.9 MG/DL (ref 6–20)
CALCIUM SERPL-MCNC: 9.3 MG/DL (ref 8.8–10.4)
CHLORIDE SERPL-SCNC: 107 MMOL/L (ref 98–107)
CREAT SERPL-MCNC: 0.52 MG/DL (ref 0.51–0.95)
CRP SERPL-MCNC: 252.78 MG/L
CRP SERPL-MCNC: 255.11 MG/L
EGFRCR SERPLBLD CKD-EPI 2021: >90 ML/MIN/1.73M2
ERYTHROCYTE [DISTWIDTH] IN BLOOD BY AUTOMATED COUNT: 13.4 % (ref 10–15)
GLUCOSE SERPL-MCNC: 93 MG/DL (ref 70–99)
GRAM STAIN RESULT: NORMAL
HCO3 SERPL-SCNC: 25 MMOL/L (ref 22–29)
HCT VFR BLD AUTO: 36.4 % (ref 35–47)
HGB BLD-MCNC: 12.4 G/DL (ref 11.7–15.7)
MCH RBC QN AUTO: 31.2 PG (ref 26.5–33)
MCHC RBC AUTO-ENTMCNC: 34.1 G/DL (ref 31.5–36.5)
MCV RBC AUTO: 92 FL (ref 78–100)
PLATELET # BLD AUTO: 249 10E3/UL (ref 150–450)
POTASSIUM SERPL-SCNC: 3.9 MMOL/L (ref 3.4–5.3)
RBC # BLD AUTO: 3.98 10E6/UL (ref 3.8–5.2)
SODIUM SERPL-SCNC: 142 MMOL/L (ref 135–145)
WBC # BLD AUTO: 7.2 10E3/UL (ref 4–11)

## 2025-06-05 PROCEDURE — 85014 HEMATOCRIT: CPT | Performed by: NURSE PRACTITIONER

## 2025-06-05 PROCEDURE — 258N000003 HC RX IP 258 OP 636: Performed by: INTERNAL MEDICINE

## 2025-06-05 PROCEDURE — 80048 BASIC METABOLIC PNL TOTAL CA: CPT | Performed by: NURSE PRACTITIONER

## 2025-06-05 PROCEDURE — 36415 COLL VENOUS BLD VENIPUNCTURE: CPT | Performed by: NURSE PRACTITIONER

## 2025-06-05 PROCEDURE — 250N000011 HC RX IP 250 OP 636: Mod: JZ | Performed by: INTERNAL MEDICINE

## 2025-06-05 PROCEDURE — 250N000011 HC RX IP 250 OP 636: Performed by: INTERNAL MEDICINE

## 2025-06-05 PROCEDURE — 250N000013 HC RX MED GY IP 250 OP 250 PS 637: Performed by: INTERNAL MEDICINE

## 2025-06-05 PROCEDURE — 86140 C-REACTIVE PROTEIN: CPT | Performed by: NURSE PRACTITIONER

## 2025-06-05 PROCEDURE — 82310 ASSAY OF CALCIUM: CPT | Performed by: NURSE PRACTITIONER

## 2025-06-05 PROCEDURE — 87205 SMEAR GRAM STAIN: CPT | Performed by: NURSE PRACTITIONER

## 2025-06-05 PROCEDURE — 87070 CULTURE OTHR SPECIMN AEROBIC: CPT | Performed by: NURSE PRACTITIONER

## 2025-06-05 PROCEDURE — 120N000001 HC R&B MED SURG/OB

## 2025-06-05 RX ORDER — KETOROLAC TROMETHAMINE 15 MG/ML
15 INJECTION, SOLUTION INTRAMUSCULAR; INTRAVENOUS EVERY 6 HOURS PRN
Status: DISPENSED | OUTPATIENT
Start: 2025-06-05 | End: 2025-06-06

## 2025-06-05 RX ORDER — AZITHROMYCIN 250 MG/1
250 TABLET, FILM COATED ORAL DAILY
Status: ACTIVE | OUTPATIENT
Start: 2025-06-06 | End: 2025-06-07

## 2025-06-05 RX ADMIN — ACETAMINOPHEN 650 MG: 325 TABLET, FILM COATED ORAL at 14:54

## 2025-06-05 RX ADMIN — Medication 25 MG: at 08:29

## 2025-06-05 RX ADMIN — ACETAMINOPHEN 650 MG: 325 TABLET, FILM COATED ORAL at 05:35

## 2025-06-05 RX ADMIN — KETOROLAC TROMETHAMINE 15 MG: 15 INJECTION, SOLUTION INTRAMUSCULAR; INTRAVENOUS at 20:46

## 2025-06-05 RX ADMIN — PANTOPRAZOLE SODIUM 40 MG: 40 TABLET, DELAYED RELEASE ORAL at 08:30

## 2025-06-05 RX ADMIN — BUPROPION HYDROCHLORIDE 100 MG: 100 TABLET, EXTENDED RELEASE ORAL at 08:30

## 2025-06-05 RX ADMIN — AZITHROMYCIN MONOHYDRATE 250 MG: 500 INJECTION, POWDER, LYOPHILIZED, FOR SOLUTION INTRAVENOUS at 08:40

## 2025-06-05 RX ADMIN — CEFTRIAXONE SODIUM 2 G: 2 INJECTION, POWDER, FOR SOLUTION INTRAMUSCULAR; INTRAVENOUS at 20:46

## 2025-06-05 RX ADMIN — ENOXAPARIN SODIUM 40 MG: 40 INJECTION SUBCUTANEOUS at 08:30

## 2025-06-05 RX ADMIN — PANTOPRAZOLE SODIUM 40 MG: 40 TABLET, DELAYED RELEASE ORAL at 20:46

## 2025-06-05 ASSESSMENT — ACTIVITIES OF DAILY LIVING (ADL)
ADLS_ACUITY_SCORE: 18

## 2025-06-05 NOTE — PROGRESS NOTES
"[facility]    Medicine Progress Note - Hospitalist Service    Date of Admission:  6/3/2025    Assessment & Plan   59yoF with COPD, DVT, and GERD presented to the ED with left-sided chest pain and SOB.  She was seen in the ED on 6/2 and diagnosed with multifocal pneumonia.  She was discharged with Augmentin and azithromycin.  Her pain got worse and so she returned to the ED.  The pain is worse with deep inspiration and cough.  She felt short of breath because she cannot take in a full breath.  Her cough was dry.  She denies fever, chills, n/v/d, abdominal pain,wheezing     SIRS (present on admission) resolved (tachycardia, elevated WBC, elevated lactic acid resolved with fluid resuscitation) .  Multifocal pneumonia  Acute hypoxic respiratory failure  Pleurisy  WBC decreasing, no fever or tachycardia. Pleuritic pain resolving.  She is starting to bring up some blood tinged sputum.  She is feeling better.      -continue ceftriaxone and azithromycin  -Strep Ag negative  -legionella ag negative  -wean oxygen as tolerated; on 4L  -continue flutter valve and IS  -CTA: neg PE, bilateral opacities  -lactic acid 2.1-->1.8  -pain control with ketorolac as she is not deep breathing due to pleuritic discomfort     GERD/GI prophylaxis  -continue PPI     COPD  -continue PTA inhalers     H/o DVT  -not on anticoagulation  -CTA: no PE     Obesity  - bupropion  and naltrexone for weight loss          Diet: Combination Diet Regular Diet Adult    DVT Prophylaxis: Enoxaparin (Lovenox) SQ  Hoskins Catheter: Not present  Lines: None     Cardiac Monitoring: None  Code Status: Full Code      Clinically Significant Risk Factors          # Hyperchloremia: Highest Cl = 108 mmol/L in last 2 days, will monitor as appropriate                         # Obesity: Estimated body mass index is 32.01 kg/m  as calculated from the following:    Height as of this encounter: 1.676 m (5' 6\").    Weight as of this encounter: 89.9 " kg (198 lb 4.8 oz)., PRESENT ON ADMISSION            Social Drivers of Health    Tobacco Use: Medium Risk (6/3/2025)    Patient History     Smoking Tobacco Use: Former     Smokeless Tobacco Use: Never     Passive Exposure: Never   Physical Activity: Insufficiently Active (11/3/2024)    Exercise Vital Sign     Days of Exercise per Week: 1 day     Minutes of Exercise per Session: 20 min   Social Connections: Unknown (11/3/2024)    Social Connection and Isolation Panel [NHANES]     Frequency of Social Gatherings with Friends and Family: More than three times a week          Disposition Plan     Medically Ready for Discharge: Anticipated Tomorrow           The patient's care was discussed with the entire care team.    Octavia Barr CNP  Hospitalist Service  [facility]  Securely message with [employer] (more info)  Text page via [employer] Paging/Directory   ______________________________________________________________________    Interval History   No acute events overnight    Physical Exam   Vital Signs: Temp: 98.9  F (37.2  C) Temp src: Oral BP: 109/55 Pulse: 83   Resp: 18 SpO2: 92 % O2 Device: Nasal cannula Oxygen Delivery: 3 LPM  Weight: 198 lbs 4.8 oz    Gen:  Lying in bed no acute distress  HEENT:  normocephalic, atraumatic, oropharynx clear  Resp:  decreased bibasilarly  Card:  S1,S2, RRR no murmur, rub or gallop  Abd:  Soft nondistended, non tender,  normoactive bowel sounds   Neuro:  Neuro exam non focal      Medical Decision Making       40 MINUTES SPENT BY ME on the date of service doing chart review, history, exam, documentation & further activities per the note.        Data     I have personally reviewed the following data over the past 24 hrs:    7.2  \   12.4   / 249     142 107 5.9 (L) /  93   3.9 25 0.52 \     Procal: N/A CRP: 255.11 (H) Lactic Acid: N/A         Imaging results reviewed over the past 24 hrs:   No results found for this or any previous visit (from the past 24  hours).

## 2025-06-05 NOTE — PLAN OF CARE
"Goal Outcome Evaluation:    Plan of Care Reviewed With: patient  Overall Patient Progress: improving  Pt is A/o x4. VSS on 3L via NC. Pt continuing to use IS and flutter valve. PRN APAP x1 for chest/shoulder pain. . Pt is up ad denys. PIV saline locked. Tolerating diet. Pt reports BM x1. Sputum sample pending.   /55 (BP Location: Right arm)   Pulse 83   Temp 98.9  F (37.2  C) (Oral)   Resp 18   Ht 1.676 m (5' 6\")   Wt 89.9 kg (198 lb 4.8 oz)   LMP 03/14/2017 (Exact Date)   SpO2 92%   BMI 32.01 kg/m        "

## 2025-06-05 NOTE — PLAN OF CARE
"Goal Outcome Evaluation:      Plan of Care Reviewed With: patient    Overall Patient Progress: improvingOverall Patient Progress: improving    Outcome Evaluation: Pt is alert and oriented x4, VSS on 4LPM NC at 92%. SOB with exertion. Chest pain managed with PRN Toradol and tylenol. Independent in room. On IV zithromax and IV rocephin. IS and flutter valve. Encouraged Splinting when coughing. Sputum sample needs to be collected again. Daily weight completed.    Blood pressure 111/67, pulse 79, temperature 98.5  F (36.9  C), temperature source Oral, resp. rate 18, height 1.676 m (5' 6\"), weight 89.9 kg (198 lb 4.8 oz), SpO2 (!) 90%.        "

## 2025-06-06 VITALS
OXYGEN SATURATION: 90 % | WEIGHT: 196 LBS | RESPIRATION RATE: 19 BRPM | BODY MASS INDEX: 31.5 KG/M2 | DIASTOLIC BLOOD PRESSURE: 64 MMHG | TEMPERATURE: 97.8 F | HEIGHT: 66 IN | HEART RATE: 82 BPM | SYSTOLIC BLOOD PRESSURE: 130 MMHG

## 2025-06-06 LAB
ANION GAP SERPL CALCULATED.3IONS-SCNC: 10 MMOL/L (ref 7–15)
BUN SERPL-MCNC: 7.3 MG/DL (ref 6–20)
CALCIUM SERPL-MCNC: 9.2 MG/DL (ref 8.8–10.4)
CHLORIDE SERPL-SCNC: 102 MMOL/L (ref 98–107)
CREAT SERPL-MCNC: 0.59 MG/DL (ref 0.51–0.95)
CRP SERPL-MCNC: 148.84 MG/L
EGFRCR SERPLBLD CKD-EPI 2021: >90 ML/MIN/1.73M2
ERYTHROCYTE [DISTWIDTH] IN BLOOD BY AUTOMATED COUNT: 13.4 % (ref 10–15)
GLUCOSE SERPL-MCNC: 104 MG/DL (ref 70–99)
HCO3 SERPL-SCNC: 26 MMOL/L (ref 22–29)
HCT VFR BLD AUTO: 33.8 % (ref 35–47)
HGB BLD-MCNC: 11.6 G/DL (ref 11.7–15.7)
MCH RBC QN AUTO: 30.7 PG (ref 26.5–33)
MCHC RBC AUTO-ENTMCNC: 34.3 G/DL (ref 31.5–36.5)
MCV RBC AUTO: 89 FL (ref 78–100)
PLATELET # BLD AUTO: 265 10E3/UL (ref 150–450)
POTASSIUM SERPL-SCNC: 3.9 MMOL/L (ref 3.4–5.3)
RBC # BLD AUTO: 3.78 10E6/UL (ref 3.8–5.2)
SODIUM SERPL-SCNC: 138 MMOL/L (ref 135–145)
WBC # BLD AUTO: 7.2 10E3/UL (ref 4–11)

## 2025-06-06 PROCEDURE — 80048 BASIC METABOLIC PNL TOTAL CA: CPT | Performed by: NURSE PRACTITIONER

## 2025-06-06 PROCEDURE — 250N000013 HC RX MED GY IP 250 OP 250 PS 637: Performed by: NURSE PRACTITIONER

## 2025-06-06 PROCEDURE — 250N000011 HC RX IP 250 OP 636: Performed by: INTERNAL MEDICINE

## 2025-06-06 PROCEDURE — 250N000011 HC RX IP 250 OP 636: Performed by: NURSE PRACTITIONER

## 2025-06-06 PROCEDURE — 999N000157 HC STATISTIC RCP TIME EA 10 MIN

## 2025-06-06 PROCEDURE — 36415 COLL VENOUS BLD VENIPUNCTURE: CPT | Performed by: NURSE PRACTITIONER

## 2025-06-06 PROCEDURE — 85027 COMPLETE CBC AUTOMATED: CPT | Performed by: NURSE PRACTITIONER

## 2025-06-06 PROCEDURE — 86140 C-REACTIVE PROTEIN: CPT | Performed by: NURSE PRACTITIONER

## 2025-06-06 PROCEDURE — 99239 HOSP IP/OBS DSCHRG MGMT >30: CPT | Performed by: NURSE PRACTITIONER

## 2025-06-06 PROCEDURE — 99207 PR APP CREDIT; MD BILLING SHARED VISIT: CPT | Mod: FS | Performed by: STUDENT IN AN ORGANIZED HEALTH CARE EDUCATION/TRAINING PROGRAM

## 2025-06-06 PROCEDURE — 250N000013 HC RX MED GY IP 250 OP 250 PS 637: Performed by: INTERNAL MEDICINE

## 2025-06-06 PROCEDURE — 250N000011 HC RX IP 250 OP 636: Mod: JZ | Performed by: INTERNAL MEDICINE

## 2025-06-06 RX ORDER — AZITHROMYCIN 250 MG/1
250 TABLET, FILM COATED ORAL ONCE
Status: COMPLETED | OUTPATIENT
Start: 2025-06-06 | End: 2025-06-06

## 2025-06-06 RX ORDER — CEFTRIAXONE 2 G/1
2 INJECTION, POWDER, FOR SOLUTION INTRAMUSCULAR; INTRAVENOUS EVERY 24 HOURS
Status: DISCONTINUED | OUTPATIENT
Start: 2025-06-06 | End: 2025-06-06 | Stop reason: HOSPADM

## 2025-06-06 RX ADMIN — AMOXICILLIN AND CLAVULANATE POTASSIUM 1 TABLET: 875; 125 TABLET, COATED ORAL at 12:44

## 2025-06-06 RX ADMIN — Medication 25 MG: at 08:13

## 2025-06-06 RX ADMIN — AZITHROMYCIN DIHYDRATE 250 MG: 250 TABLET, FILM COATED ORAL at 08:13

## 2025-06-06 RX ADMIN — BUPROPION HYDROCHLORIDE 100 MG: 100 TABLET, EXTENDED RELEASE ORAL at 08:13

## 2025-06-06 RX ADMIN — AZITHROMYCIN DIHYDRATE 250 MG: 250 TABLET, FILM COATED ORAL at 11:41

## 2025-06-06 RX ADMIN — ENOXAPARIN SODIUM 40 MG: 40 INJECTION SUBCUTANEOUS at 08:13

## 2025-06-06 RX ADMIN — PANTOPRAZOLE SODIUM 40 MG: 40 TABLET, DELAYED RELEASE ORAL at 08:13

## 2025-06-06 RX ADMIN — KETOROLAC TROMETHAMINE 15 MG: 15 INJECTION, SOLUTION INTRAMUSCULAR; INTRAVENOUS at 04:50

## 2025-06-06 ASSESSMENT — ACTIVITIES OF DAILY LIVING (ADL)
ADLS_ACUITY_SCORE: 25
ADLS_ACUITY_SCORE: 18
ADLS_ACUITY_SCORE: 25
ADLS_ACUITY_SCORE: 18
ADLS_ACUITY_SCORE: 25
ADLS_ACUITY_SCORE: 18
ADLS_ACUITY_SCORE: 25
ADLS_ACUITY_SCORE: 25
ADLS_ACUITY_SCORE: 18

## 2025-06-06 NOTE — PROGRESS NOTES
Patient has been assessed for Home Oxygen needs.  Oxygen readings:   *   RA - at rest  Pulse oximetry SPO2 90 %  *   RA - during activity/with exercise SPO2 84 %  *   O2 at  0 liters/minute (at rest) ...SPO2 90 %  *   O2 at  3 liters/minute (during activity/with exercise) ...SPO2 92 %

## 2025-06-06 NOTE — PROGRESS NOTES
S-(situation): Patient discharged to home via wheelchair with family.    B-(background): ARF     A-(assessment): Pt A&Ox4, VSS on room air, desats with activity needs 2-3L nc. Home O2 order. Ambulating independently. Tolerating oral intake.     R-(recommendations): Discharge instructions reviewed with patient. Listed belongings gathered and returned to patient.          Discharge Nursing Criteria:   Patient Education given and documented: (STROKE, CHF, Diabetes):  {Yes   Care Plan and Patient education resolved: Yes    New Medications- pt has been educated about purpose and side effects: Not Applicable    Vaccines  Influenza status verified at discharge:  Not Applicable      MISC  Prescriptions if needed, hard copies sent with patient  NA  Home medications returned to patient: Yes  Medication Bin checked and emptied on discharge Yes  Patient reports post-discharge pain management plan is effective: Yes    Temp: 97.8  F (36.6  C) Temp src: Oral BP: 130/64 Pulse: 82   Resp: 19 SpO2: (!) 90 % O2 Device: None (Room air)

## 2025-06-06 NOTE — DISCHARGE SUMMARY
"[facility]  Hospitalist Discharge Summary      Date of Admission:  6/3/2025  Date of Discharge:  6/6/2025  3:06 PM  Discharging Provider: Octavia Barr CNP  Discharge Service: Hospitalist Service    Clinically Significant Risk Factors     # Obesity: Estimated body mass index is 31.64 kg/m  as calculated from the following:    Height as of this encounter: 1.676 m (5' 6\").    Weight as of this encounter: 88.9 kg (196 lb).       Follow-ups Needed After Discharge   Follow-up Appointments       Hospital Follow-up with Existing Primary Care Provider (PCP)          Schedule Primary Care visit within: 7 Days             Unresulted Labs Ordered in the Past 30 Days of this Admission       Date and Time Order Name Status Description    6/5/2025 11:32 AM Respiratory Aerobic Bacterial Culture with Gram Stain Preliminary         These results will be followed up by the hospitalist team    Discharge Disposition   Discharged to home  Condition at discharge: Stable    Hospital Course   59yoF with COPD, DVT, and GERD presented to the ED with left-sided chest pain and SOB.  She was seen in the ED on 6/2 and diagnosed with multifocal pneumonia.  She was discharged with Augmentin and azithromycin.  Her pain got worse and so she returned to the ED the following day.  The pain is worse with deep inspiration and cough.  She felt short of breath because she cannot take in a full breath.  Her cough was dry.  She denied fever, chills, n/v/d, abdominal pain,wheezing      SIRS (present on admission) resolved (tachycardia, elevated WBC, elevated lactic acid resolved with fluid resuscitation) Lactic acid 2.1 --->1.8 after crystalloid bolus.  Multifocal pneumonia  Acute hypoxic respiratory failure  Pleuritic pain  WBC decreasing, no fever or tachycardia. Pleuritic pain resolving.  She is starting to bring up some blood tinged sputum.  She is feeling better but requires oxygen at 3 liters with activity.  She completed " a course of azithromycin and a 4 day course of azithromycin.   Patient follows with Pulmonology and has required longer courses of antibiotics for pneumonia in the past.  Strep ag and legionella ag negative.       -home oxygen 3 lpm with activity  -continue flutter valve and IS  -CTA: neg PE, bilateral opacities-follow up imaging as needed and follow up with Pulmonology  -use acetaminophen or NSAID sparingly for recurrent pleuritic pain  - follow up with primary care provider as well     GERD/GI prophylaxis  -continue PPI     COPD  -continue PTA inhalers     H/o DVT  -not on anticoagulation  -CTA: no PE     Obesity  - bupropion  and naltrexone for weight loss    Consultations This Hospital Stay   None    Code Status   Full Code    Time Spent on this Encounter   I, Octavia Barr CNP, personally saw the patient today and spent greater than 30 minutes discharging this patient.       Octavia Barr CNP  [address] MEDICAL SURGICAL  [address] [place] [address]  Phone: [phone]  ______________________________________________________________________    Physical Exam   Vital Signs: Temp: 97.8  F (36.6  C) Temp src: Oral BP: 130/64 Pulse: 82   Resp: 19 SpO2: (!) 90 % O2 Device: None (Room air) Oxygen Delivery: 2 LPM  Weight: 196 lbs 0 oz  Gen:  Lying in bed no acute distress  HEENT:  normocephalic, atraumatic, oropharynx clear  Resp:  CTA  Card:  S1,S2, RRR no murmur, rub or gallop  Abd:  Soft nondistended, non tender,  normoactive bowel sounds   Neuro:  Neuro exam non focal       Primary Care Physician   Shahid Hong    Discharge Orders      Adult Pulmonary Medicine  Referral      Reason for your hospital stay    Community acquired pneumonia     Activity    Your activity upon discharge: activity as tolerated     Flutter Valve    Continue to use flutter valve 4 times a day For 1 month     Oxygen Adult/Peds    Oxygen Documentation  I certify that this patient, Velma Rizvi has  been under my care (or a nurse practitioner or physican's assistant working with me). This is the face-to-face encounter for oxygen medical necessity.      At the time of this encounter, I have reviewed the qualifying testing and have determined that supplemental oxygen is reasonable and necessary and is expected to improve the patient's condition in a home setting.         Patient has continued oxygen desaturation due to Pneumonia J18.9.    If portability is ordered, is the patient mobile within the home? yes    Was this visit performed as a telehealth visit: No     Diet    Follow this diet upon discharge: Current Diet:Orders Placed This Encounter      Combination Diet Regular Diet Adult     Hospital Follow-up with Existing Primary Care Provider (PCP)            Significant Results and Procedures   Most Recent 3 CBC's:  Recent Labs   Lab Test 06/06/25  0520 06/05/25  0534 06/04/25  0514   WBC 7.2 7.2 11.0   HGB 11.6* 12.4 11.5*   MCV 89 92 90    249 228     Most Recent 3 BMP's:  Recent Labs   Lab Test 06/06/25 0520 06/05/25  0534 06/04/25  0514    142 140   POTASSIUM 3.9 3.9 3.7   CHLORIDE 102 107 108*   CO2 26 25 22   BUN 7.3 5.9* 6.8   CR 0.59 0.52 0.50*   ANIONGAP 10 10 10   ALEKSANDRA 9.2 9.3 8.7*   * 93 116*   ,   Results for orders placed or performed during the hospital encounter of 06/03/25   CT Chest Pulmonary Embolism w Contrast    Narrative    EXAM: CT CHEST PULMONARY EMBOLISM W CONTRAST  LOCATION: [facility]  DATE: 06/03/2025    INDICATION: Left-sided pleuritic chest pain (multifocal pneumonia on x-ray yesterday).  COMPARISON: Chest CTA on 06/02/2024.  TECHNIQUE: CT chest pulmonary angiogram during arterial phase injection of IV contrast. Multiplanar reformats and MIP reconstructions were performed. Dose reduction techniques were used.   CONTRAST: Isovue 370: 70 mL.    FINDINGS:  ANGIOGRAM CHEST: No evidence of pulmonary embolism. The main pulmonary artery  is enlarged measuring 3.7 cm in diameter, this can be seen with pulmonary hypertension. No evidence of thoracic aortic aneurysm or dissection. No evidence for right heart   strain.    LUNGS AND PLEURA: No pleural effusion or pneumothorax. Upper lobes predominant emphysematous changes. Bilateral patchy pulmonary opacities, most prominent in the right middle lobe and lingula, likely infectious. There is a 1.2 x 0.8 cm spiculated nodule   in the right upper lobe (series 6 image 91), not significantly changed as compared to the 02/11/2025 exam.    MEDIASTINUM/AXILLAE: Mild cardiomegaly. Small amount of fluid along the superior aortic recess. Multiple prominent mediastinal and hilar lymph nodes, indeterminate, could be reactive.    CORONARY ARTERY CALCIFICATION: Mild.    UPPER ABDOMEN: Limited evaluation of the upper abdomen due to lack of coverage. 3 cm partially exophytic renal cyst at the upper pole of the left kidney. Right upper quadrant post-cholecystectomy clips.    MUSCULOSKELETAL: No suspicious osseous lesion.      Impression    IMPRESSION:  1.  No evidence of pulmonary embolism. The main pulmonary artery is enlarged measuring 3.7 cm in diameter, this can be seen with pulmonary hypertension.  2.  Bilateral patchy pulmonary opacities, most prominent in the right middle lobe and lingula, likely infectious.  3.  A 1.2 x 0.8 cm consolidative nodule in the right upper lobe, not significantly changed as compared to the 02/11/2025 exam.  4.  Extensive upper lobes predominant emphysematous changes.       Discharge Medications   Discharge Medication List as of 6/6/2025  2:38 PM        CONTINUE these medications which have CHANGED    Details   amoxicillin-clavulanate (AUGMENTIN) 875-125 MG tablet Take 1 tablet by mouth 2 times daily. Take 1 table by mouth 2 times daily for 13 doses (6 1/2 days), Disp-13 tablet, Historical           CONTINUE these medications which have NOT CHANGED    Details   acetaminophen (TYLENOL) 500  MG tablet Take 1,000 mg by mouth every 8 hours as needed for mild pain., Historical      albuterol (VENTOLIN HFA) 108 (90 Base) MCG/ACT inhaler Inhale 2 puffs into the lungs every 4 hours as needed for shortness of breath or wheezing., Disp-18 g, R-3, E-PrescribePharmacy may dispense brand covered by insurance (Proair, or proventil or ventolin or generic albuterol inhaler)      buPROPion (WELLBUTRIN SR) 100 MG 12 hr tablet Take 1 tablet (100 mg) by mouth every morning for 7 days, THEN 1 tablet (100 mg) 2 times daily for 23 days. 1 tablet by mouth once daily in AM for 7 days then 1 tablet BID (AM and early PM) thereafter. Take with naltrexone.., Disp-53 tablet, R-0, E-Presc ribe      Cholecalciferol (VITAMIN D3) 75 MCG (3000 UT) TABS Take 75 mcg by mouth daily, Historical      COMPRESSION STOCKINGS 1 each daily Wear daily size large, two pair, Disp-2 each, R-0, Local Print      estradiol (VAGIFEM) 10 MCG TABS vaginal tablet Place 1 tablet (10 mcg) vaginally twice a week., Disp-24 tablet, R-3, E-Prescribe      Fluticasone-Umeclidin-Vilant (TRELEGY ELLIPTA) 100-62.5-25 MCG/ACT oral inhaler Inhale 1 puff into the lungs daily., Disp-180 each, R-3, E-Prescribe      ibuprofen (ADVIL/MOTRIN) 200 MG tablet Take 800 mg by mouth every 8 hours as needed for pain, Historical      naltrexone (DEPADE/REVIA) 50 MG tablet Naltrexone 50 mg tablet: 0.5 tablet by mouth daily in AM for 7 days then 0.5 tablet BID (AM and early PM) thereafter, Disp-27 tablet, R-0, E-Prescribe      omeprazole (PRILOSEC) 40 MG DR capsule Take 1 capsule (40 mg) by mouth daily., Disp-90 capsule, R-1, E-Prescribe      topiramate (TOPAMAX) 25 MG tablet Take 1 tablet (25 mg) by mouth at bedtime., Disp-90 tablet, R-0, E-Prescribe           STOP taking these medications       azithromycin (ZITHROMAX) 250 MG tablet Comments:   Reason for Stopping:         phentermine (ADIPEX-P) 37.5 MG capsule Comments:   Reason for Stopping:         phentermine (LOMAIRA) 8 MG  tablet Comments:   Reason for Stopping:             Allergies   No Known Allergies

## 2025-06-06 NOTE — PROGRESS NOTES
"[id number] Shift Note:  Pt A&OX4. VSS on 2L NC. Pt ambulating independently. PIV SL.     /55 (BP Location: Right arm)   Pulse 83   Temp 98.9  F (37.2  C) (Oral)   Resp 18   Ht 1.676 m (5' 6\")   Wt 89.9 kg (198 lb 4.8 oz)   LMP 03/14/2017 (Exact Date)   SpO2 92%   BMI 32.01 kg/m      "

## 2025-06-06 NOTE — CONSULTS
SPIRITUAL HEALTH SERVICES Consult Note    Medical Surgical Unit at [facility]    REFERRAL SOURCE/REASON FOR VISIT - Saw patient's daughter per  consult.      SUMMARY - I stopped in to visit Velma; her daughter and young granddaughter were present, and the child was talkative.  My brief visit was with the daughter, which was little more than an introduction.  According to the patient's documentation, she came in with chest pain and SOB.  She has improved now, and is ready for discharge.           Plan - No plan to follow.    Edward So, Ph.D,   Spiritual Health Services  [address] [place] [address]  [phone]  [email or web address]    Assessment -     Strengths, Coping, and Resources - spouse, family, brian, brian community    Meaning, Beliefs, and Spirituality - Patient reported at admission that she is Ca[employer].

## 2025-06-06 NOTE — PROGRESS NOTES
[facility]    Medicine Progress Note - Hospitalist Service    Date of Admission:  6/3/2025      Oxygen Documentation  I certify that this patient, Velma Rizvi has been under my care (or a nurse practitioner or physican's assistant working with me). This is the face-to-face encounter for oxygen medical necessity.      At the time of this encounter, I have reviewed the qualifying testing and have determined that supplemental oxygen is reasonable and necessary and is expected to improve the patient's condition in a home setting.         Patient has continued oxygen desaturation due to Pneumonia J18.9.    If portability is ordered, is the patient mobile within the home? yes    Was this visit performed as a telehealth visit: No        Octavia Barr CNP  Hospitalist Service  [facility]  Securely message with [employer] (more info)  Text page via [facility] Paging/Directory

## 2025-06-07 LAB
BACTERIA SPT CULT: NORMAL
GRAM STAIN RESULT: NORMAL

## 2025-06-08 ENCOUNTER — PATIENT OUTREACH (OUTPATIENT)
Dept: CARE COORDINATION | Facility: CLINIC | Age: 60
End: 2025-06-08
Payer: COMMERCIAL

## 2025-06-08 NOTE — PROGRESS NOTES
[facility]: Transitions of Care Outreach  Chief Complaint   Patient presents with    Clinic Care Coordination - Post Hospital       Most Recent Admission Date: 6/3/2025   Most Recent Admission Diagnosis: Acute respiratory failure with hypoxia (H) - J96.01  Multifocal pneumonia - J18.9     Most Recent Discharge Date: 6/6/2025   Most Recent Discharge Diagnosis: Acute respiratory failure with hypoxia (H) - J96.01  Multifocal pneumonia - J18.9  COPD, severe (H) - J44.9     Transitions of Care Assessment    Discharge Assessment  How are you doing now that you are home?: Patient states each day it gets a little better, feels pretty decent.  How are your symptoms? (Red Flag symptoms escalate to triage hotline per guidelines): Improved  Do you know how to contact your clinic care team if you have future questions or changes to your health status? : Yes  Does the patient have their discharge instructions? : Yes  Does the patient have questions regarding their discharge instructions? : No  Were you started on any new medications or were there changes to any of your previous medications? : Yes  Does the patient have all of their medications?: Yes  Do you have questions regarding any of your medications? : No  Do you have all of your needed medical supplies or equipment (DME)?  (i.e. oxygen tank, CPAP, cane, etc.): Yes         Post-op (Clinicians Only)  Did the patient have surgery or a procedure: No  Fever: No  Chills: No  Eating & Drinking: eating and drinking without complaints/concerns  PO Intake: regular diet  Additional Symptoms:  (Denies)    [facility] Explained and offered Care Coordination support to eligible patients: Yes    Patient accepted? No    Follow up Plan     Discharge Follow-Up  Discharge follow up appointment scheduled in alignment with recommended follow up timeframe or Transitions of Risk Category? (Low = within 30 days; Moderate= within 14 days; High= within 7 days): Yes  Discharge Follow  Up Appointment Date: 06/13/25  Discharge Follow Up Appointment Scheduled with?: Primary Care Provider    Future Appointments   Date Time Provider Department Center   6/13/2025 10:20 AM Shahid Hong PA-C [facility]       Outpatient Plan as outlined on AVS reviewed with patient.    For any urgent concerns, please contact our 24 hour nurse triage line: [phone] ([phone])       Dalila Morgan RN

## 2025-06-09 ENCOUNTER — TELEPHONE (OUTPATIENT)
Facility: CLINIC | Age: 60
End: 2025-06-09
Payer: COMMERCIAL

## 2025-06-09 NOTE — TELEPHONE ENCOUNTER
Writer spoke with patient who reports her shortness of breath and left-sided chest pain has improved and continues to improve each day. Patient reports she has been taking her antibiotics as prescribed and has 10 days left. Denies any new or worsening symptoms. Patient was advised by ER provider to follow up with pulmonology.  Patient's LOV with Dr. Monteiro was on 2/20/25 and was advised to follow up in 1 year. Next available appointment with Dr. Monteiro is on 8/6/25. Routing to Dr. Monteiro to review and advise.   Sharmila Nick RN on 6/9/2025 at 3:06 PM

## 2025-06-09 NOTE — TELEPHONE ENCOUNTER
Health Call Center    Phone Message    May a detailed message be left on voicemail: yes     Reason for Call: Other: Hospital follow-up   Patient in need of a hospital follow-up. States she is only open to seeing Dr. Monteiro in [place]. Please call back to connect. Thank you.     Action Taken: Other: Pulm    Travel Screening: Not Applicable     Date of Service: 6/9/25

## 2025-06-10 NOTE — TELEPHONE ENCOUNTER
Lakhwinder Monteiro MD to Sharmila Nick RN (Selected Message)        6/10/25  7:27 AM  Follow up in August Is ok

## 2025-06-13 ENCOUNTER — RESULTS FOLLOW-UP (OUTPATIENT)
Dept: FAMILY MEDICINE | Facility: OTHER | Age: 60
End: 2025-06-13

## 2025-06-13 DIAGNOSIS — R73.03 PREDIABETES: Primary | ICD-10-CM

## 2025-06-13 PROBLEM — J96.01 ACUTE RESPIRATORY FAILURE WITH HYPOXIA (H): Status: RESOLVED | Noted: 2025-06-03 | Resolved: 2025-06-13

## 2025-06-19 RX ORDER — METFORMIN HYDROCHLORIDE 500 MG/1
500 TABLET, EXTENDED RELEASE ORAL
Qty: 90 TABLET | Refills: 1 | Status: SHIPPED | OUTPATIENT
Start: 2025-06-19

## 2025-06-30 ENCOUNTER — THERAPY VISIT (OUTPATIENT)
Dept: SPEECH THERAPY | Facility: CLINIC | Age: 60
End: 2025-06-30
Attending: PHYSICIAN ASSISTANT
Payer: COMMERCIAL

## 2025-06-30 DIAGNOSIS — Z87.01 HISTORY OF PNEUMONIA: ICD-10-CM

## 2025-06-30 DIAGNOSIS — R13.10 DYSPHAGIA, UNSPECIFIED TYPE: ICD-10-CM

## 2025-06-30 PROCEDURE — 92610 EVALUATE SWALLOWING FUNCTION: CPT | Mod: GN

## 2025-06-30 NOTE — PROGRESS NOTES
"SPEECH LANGUAGE PATHOLOGY EVALUATION       Fall Risk Screen:  Have you fallen 2 or more times in the past year?: No  Have you fallen and had an injury in the past year?: No    Subjective        Presenting condition or subjective complaint:    Date of onset:      Relevant medical history:     Dates & types of surgery: Had my gallbladder taking  out    Prior diagnostic imaging/testing results:       Prior therapy history for the same diagnosis, illness or injury: No      Living Environment  Social support: With a significant other or spouse   Help at home: None  Equipment owned:       Employment: Yes Housekeeping  Hobbies/Interests:      Patient goals for therapy:      Pain assessment:      Objective     SWALLOW EVALUTION  Dysphagia history: Patient is a 59-year-old female who was referred for a swallow evaluation regarding concerns of dysphagia. Per chart review pt reported that \"someone had concerns for aspiration when she was admitted to the hospital. Pt has a hx of COPD and recurrent pneumonia. Pt reported that she had \"pneumonia in one lung, then in the other lung and then in both lungs\". Pt reported that\"Had more of going down the wrong tube when smoking\" and after smoking it occurs less frequently. Currently pt is not avoiding any foods and pt denies difficulties with pills.   Current Diet/Method of Nutritional Intake: thin liquids (level 0), regular diet        CLINICAL SWALLOW EVALUATION  Oral Motor Function: generally intact     Level of assist required for feeding: no assistance needed   Textures Trialed:   Clinical Swallow Eval: Thin Liquids  Mode of presentation: cup, self-fed   Volume presented: 3 oz  Preparatory Phase: WFL  Oral Phase: WFL  Pharyngeal phase of swallow: intact   Strategies trialed during procedure: SUSANA SLP CLINICAL EVAL STRATEGIES: N/a   Diagnostic statement: Pt swallow appeared WFL. No overt s/sx of aspiration.    Clinical Swallow Eval: Purees  Mode of presentation: spoon, self-fed "   Volume presented: 4 oz  Preparatory Phase: WFL  Oral Phase: WFL  Pharyngeal phase of swallow: intact   Strategies trialed during procedure: SUSANA SLP CLINICAL EVAL STRATEGIES: N/a   Diagnostic statement: Pt swallow appeared WFL. No overt s/sx of aspiration. Mastication and preparation was timely.    Clinical Swallow Eval: Soft & Bite Sized  Mode of presentation: spoon, self-fed   Volume presented: 3 oz  Preparatory Phase: WFL  Oral Phase: WFL  Pharyngeal phase of swallow: intact   Strategies trialed during procedure: SUSANA SLP CLINICAL EVAL STRATEGIES: N/a   Diagnostic statement: Pt swallow appeared WFL. No overt s/sx of aspiration. Mastication and preparation was timely.      Clinical Swallow Eval: Solids  Mode of presentation: self-fed   Volume presented: beef stick  Preparatory Phase: WFL  Oral Phase: WFL  Pharyngeal phase of swallow: intact   Strategies trialed during procedure: SUSANA SLP CLINICAL EVAL STRATEGIES: N/a   Diagnostic statement: Pt swallow appeared WFL. No overt s/sx of aspiration. Mastication and preparation was timely.           ESOPHAGEAL PHASE OF SWALLOW  no observed or reported concerns related to esophageal function     SWALLOW ASSESSMENT CLINICAL IMPRESSIONS AND RATIONALE  Diet Consistency Recommendations: thin liquids (level 0), regular diet    Recommended Feeding/Eating Techniques: small bolus size   Medication Administration Recommendations: Whole with thin liquids  Instrumental Assessment Recommendations: VFSS (videofluoroscopic swallowing study), further assessment is recommended d/t recurrent pneumonia to rule-out silent aspiration.      Assessment & Plan   CLINICAL IMPRESSIONS   Medical Diagnosis: Dysphagia, unspecified type  History of pneumonia    Treatment Diagnosis: Dysphagia   Impression/Assessment: Pt swallow appeared WFL. No overt s/sx of aspiration. Swallow appeared to be initiated in a timely manner and mastication was timely. Oral mech exam is WNL. Pt with adequate natural  dentition, no missing teeth noted.  Instrumental assessment recommended at this time is a VFSS to rule out silent aspiration d/t a hx of recurrent pneumonia. Thank you for your appropriate referral.    PLAN OF CARE  Treatment Interventions: Instrumental assessment recommended    Prognosis to achieve stated therapy goals is good   Rehab potential is impacted by: comorbidities, patient awareness of deficits    Long Term Goals:   SLP Goal 1  Goal Identifier: Videofluoroscopic Swallow Study (VFSS)  Goal Description: Patient will particiapte in addtional testing to rule out silent aspiration.  Rationale: To maximize safety, ease and/or independence of oral intake  Target Date: 09/28/25      Frequency of Treatment: Instrumental assessment recommended.  Duration of Treatment: Instrumental assessment recommended.     Recommended Referrals to Other Professionals:   Education Assessment:   Learner/Method: Patient  Education Comments: Educated pt on results of the evalution, safe swallow recommendations, and POC    Risks and benefits of evaluation/treatment have been explained.   Patient/Family/caregiver agrees with Plan of Care.     Evaluation Time:    SLP Eval: oral/pharyngeal swallow function, clinical minutes (06899): 25         Signing Clinician: SHAYLA CAMPOS

## 2025-07-07 ENCOUNTER — MYC REFILL (OUTPATIENT)
Dept: FAMILY MEDICINE | Facility: OTHER | Age: 60
End: 2025-07-07
Payer: COMMERCIAL

## 2025-07-07 DIAGNOSIS — E66.812 CLASS 2 OBESITY WITHOUT SERIOUS COMORBIDITY WITH BODY MASS INDEX (BMI) OF 35.0 TO 35.9 IN ADULT, UNSPECIFIED OBESITY TYPE: ICD-10-CM

## 2025-07-22 DIAGNOSIS — J44.9 COPD, MODERATE (H): ICD-10-CM

## 2025-07-22 RX ORDER — ALBUTEROL SULFATE 90 UG/1
2 INHALANT RESPIRATORY (INHALATION) EVERY 4 HOURS PRN
Qty: 18 G | Refills: 3 | Status: SHIPPED | OUTPATIENT
Start: 2025-07-22

## 2025-08-06 ENCOUNTER — OFFICE VISIT (OUTPATIENT)
Dept: PULMONOLOGY | Facility: CLINIC | Age: 60
End: 2025-08-06
Payer: COMMERCIAL

## 2025-08-06 VITALS
OXYGEN SATURATION: 98 % | WEIGHT: 193 LBS | SYSTOLIC BLOOD PRESSURE: 124 MMHG | TEMPERATURE: 98.2 F | HEIGHT: 66 IN | DIASTOLIC BLOOD PRESSURE: 80 MMHG | HEART RATE: 78 BPM | BODY MASS INDEX: 31.02 KG/M2

## 2025-08-06 DIAGNOSIS — R59.0 HILAR LYMPHADENOPATHY: Primary | ICD-10-CM

## 2025-08-06 DIAGNOSIS — J18.9 MULTIFOCAL PNEUMONIA: ICD-10-CM

## 2025-08-06 DIAGNOSIS — J44.9 COPD, SEVERE (H): ICD-10-CM

## 2025-08-06 LAB
BASOPHILS # BLD AUTO: 0.1 10E3/UL (ref 0–0.2)
BASOPHILS NFR BLD AUTO: 1 %
EOSINOPHIL # BLD AUTO: 0.2 10E3/UL (ref 0–0.7)
EOSINOPHIL NFR BLD AUTO: 2 %
ERYTHROCYTE [DISTWIDTH] IN BLOOD BY AUTOMATED COUNT: 12.8 % (ref 10–15)
HCT VFR BLD AUTO: 41.2 % (ref 35–47)
HGB BLD-MCNC: 14.2 G/DL (ref 11.7–15.7)
IMM GRANULOCYTES # BLD: 0 10E3/UL
IMM GRANULOCYTES NFR BLD: 0 %
LYMPHOCYTES # BLD AUTO: 2.7 10E3/UL (ref 0.8–5.3)
LYMPHOCYTES NFR BLD AUTO: 35 %
MCH RBC QN AUTO: 30.5 PG (ref 26.5–33)
MCHC RBC AUTO-ENTMCNC: 34.5 G/DL (ref 31.5–36.5)
MCV RBC AUTO: 89 FL (ref 78–100)
MONOCYTES # BLD AUTO: 0.6 10E3/UL (ref 0–1.3)
MONOCYTES NFR BLD AUTO: 8 %
NEUTROPHILS # BLD AUTO: 4.3 10E3/UL (ref 1.6–8.3)
NEUTROPHILS NFR BLD AUTO: 54 %
NRBC # BLD AUTO: 0 10E3/UL
NRBC BLD AUTO-RTO: 0 /100
PLATELET # BLD AUTO: 291 10E3/UL (ref 150–450)
RBC # BLD AUTO: 4.65 10E6/UL (ref 3.8–5.2)
WBC # BLD AUTO: 7.8 10E3/UL (ref 4–11)

## 2025-08-06 PROCEDURE — 82784 ASSAY IGA/IGD/IGG/IGM EACH: CPT

## 2025-08-06 PROCEDURE — 82785 ASSAY OF IGE: CPT

## 2025-08-06 PROCEDURE — 3079F DIAST BP 80-89 MM HG: CPT

## 2025-08-06 PROCEDURE — 85025 COMPLETE CBC W/AUTO DIFF WBC: CPT

## 2025-08-06 PROCEDURE — 3074F SYST BP LT 130 MM HG: CPT

## 2025-08-06 PROCEDURE — 36415 COLL VENOUS BLD VENIPUNCTURE: CPT

## 2025-08-06 PROCEDURE — 86036 ANCA SCREEN EACH ANTIBODY: CPT

## 2025-08-06 PROCEDURE — 99214 OFFICE O/P EST MOD 30 MIN: CPT

## 2025-08-07 LAB
IGE SERPL-ACNC: 57 KU/L (ref 0–114)
IGG SERPL-MCNC: 790 MG/DL (ref 610–1616)

## 2025-08-11 LAB
ANCA AB PATTERN SER IF-IMP: NORMAL
C-ANCA TITR SER IF: NORMAL {TITER}

## 2025-08-18 ENCOUNTER — VIRTUAL VISIT (OUTPATIENT)
Dept: FAMILY MEDICINE | Facility: OTHER | Age: 60
End: 2025-08-18
Payer: COMMERCIAL

## 2025-08-18 DIAGNOSIS — E66.812 CLASS 2 OBESITY WITHOUT SERIOUS COMORBIDITY WITH BODY MASS INDEX (BMI) OF 35.0 TO 35.9 IN ADULT, UNSPECIFIED OBESITY TYPE: ICD-10-CM

## 2025-08-18 PROCEDURE — 98014 SYNCH AUDIO-ONLY EST MOD 30: CPT | Performed by: PHYSICIAN ASSISTANT

## 2025-08-18 RX ORDER — TOPIRAMATE 25 MG/1
37.5 TABLET, FILM COATED ORAL AT BEDTIME
COMMUNITY
Start: 2025-08-18

## 2025-09-02 ENCOUNTER — MYC REFILL (OUTPATIENT)
Dept: FAMILY MEDICINE | Facility: OTHER | Age: 60
End: 2025-09-02
Payer: COMMERCIAL

## 2025-09-02 DIAGNOSIS — E66.812 CLASS 2 OBESITY WITHOUT SERIOUS COMORBIDITY WITH BODY MASS INDEX (BMI) OF 35.0 TO 35.9 IN ADULT, UNSPECIFIED OBESITY TYPE: ICD-10-CM

## 2025-09-02 RX ORDER — TOPIRAMATE 25 MG/1
37.5 TABLET, FILM COATED ORAL AT BEDTIME
Qty: 135 TABLET | Refills: 1 | Status: SHIPPED | OUTPATIENT
Start: 2025-09-02

## (undated) DEVICE — PACK GENERAL LAPAOSCOPY

## (undated) DEVICE — CLIP APPLIER ENDO ROTATING 10MM MED/LG ER320

## (undated) DEVICE — ENDO TROCAR SLEEVE KII Z-THREADED 05X100MM CTS02

## (undated) DEVICE — ESU GROUND PAD UNIVERSAL W/O CORD

## (undated) DEVICE — GLOVE PROTEXIS W/NEU-THERA 7.5  2D73TE75

## (undated) DEVICE — SOL NACL 0.9% INJ 1000ML BAG 07983-09

## (undated) DEVICE — ENDO SHEARS 5MM 176643

## (undated) DEVICE — GLOVE PROTEXIS BLUE W/NEU-THERA 8.0  2D73EB80

## (undated) DEVICE — SU MONOCRYL 4-0 PS-2 18" UND Y496G

## (undated) DEVICE — SU DERMABOND PROPEN .5ML DPP6

## (undated) DEVICE — ESU HOOK TIP 5MM CONMED

## (undated) DEVICE — ESU SUCTION/IRRIGATION SYSTEM PISTOL GRIP

## (undated) DEVICE — ENDO TROCAR FIRST ENTRY KII FIOS Z-THRD 11X100MM CTF33

## (undated) DEVICE — NDL INSUFFLATION 13GA 120MM C2201

## (undated) DEVICE — ENDO CLIP CARTIRDGE K2 MED/LG 10 1112

## (undated) DEVICE — ENDO TROCAR FIRST ENTRY KII FIOS Z-THRD 05X100MM CTF03

## (undated) RX ORDER — FENTANYL CITRATE 50 UG/ML
INJECTION, SOLUTION INTRAMUSCULAR; INTRAVENOUS
Status: DISPENSED
Start: 2019-02-11

## (undated) RX ORDER — DEXAMETHASONE SODIUM PHOSPHATE 10 MG/ML
INJECTION INTRAMUSCULAR; INTRAVENOUS
Status: DISPENSED
Start: 2019-02-11

## (undated) RX ORDER — LIDOCAINE HYDROCHLORIDE 20 MG/ML
INJECTION, SOLUTION EPIDURAL; INFILTRATION; INTRACAUDAL; PERINEURAL
Status: DISPENSED
Start: 2019-02-11

## (undated) RX ORDER — ONDANSETRON 2 MG/ML
INJECTION INTRAMUSCULAR; INTRAVENOUS
Status: DISPENSED
Start: 2018-01-08

## (undated) RX ORDER — BUPIVACAINE HYDROCHLORIDE AND EPINEPHRINE 2.5; 5 MG/ML; UG/ML
INJECTION, SOLUTION EPIDURAL; INFILTRATION; INTRACAUDAL; PERINEURAL
Status: DISPENSED
Start: 2019-02-11

## (undated) RX ORDER — ONDANSETRON 2 MG/ML
INJECTION INTRAMUSCULAR; INTRAVENOUS
Status: DISPENSED
Start: 2019-02-11

## (undated) RX ORDER — LIDOCAINE HYDROCHLORIDE 10 MG/ML
INJECTION, SOLUTION EPIDURAL; INFILTRATION; INTRACAUDAL; PERINEURAL
Status: DISPENSED
Start: 2018-01-08

## (undated) RX ORDER — PROPOFOL 10 MG/ML
INJECTION, EMULSION INTRAVENOUS
Status: DISPENSED
Start: 2019-02-11

## (undated) RX ORDER — FENTANYL CITRATE 50 UG/ML
INJECTION, SOLUTION INTRAMUSCULAR; INTRAVENOUS
Status: DISPENSED
Start: 2018-01-08